# Patient Record
Sex: MALE | Race: WHITE | Employment: FULL TIME
[De-identification: names, ages, dates, MRNs, and addresses within clinical notes are randomized per-mention and may not be internally consistent; named-entity substitution may affect disease eponyms.]

---

## 2017-02-27 ENCOUNTER — TELEPHONE (OUTPATIENT)
Dept: CASE MANAGEMENT | Age: 52
End: 2017-02-27

## 2017-06-07 ENCOUNTER — TELEPHONE (OUTPATIENT)
Dept: CASE MANAGEMENT | Age: 52
End: 2017-06-07

## 2018-03-05 ENCOUNTER — TELEPHONE (OUTPATIENT)
Dept: CASE MANAGEMENT | Age: 53
End: 2018-03-05

## 2019-02-24 ENCOUNTER — HOSPITAL ENCOUNTER (OUTPATIENT)
Age: 54
Discharge: HOME | End: 2019-02-24
Payer: COMMERCIAL

## 2019-02-24 DIAGNOSIS — Z00.00 ROUTINE GENERAL MEDICAL EXAMINATION AT A HEALTH CARE FACILITY: ICD-10-CM

## 2019-02-24 LAB
ALBUMIN SERPL-MCNC: 4.3 G/DL (ref 3.5–5.2)
ALP BLD-CCNC: 62 U/L (ref 40–129)
ALT SERPL-CCNC: 21 U/L (ref 0–40)
ANION GAP SERPL CALCULATED.3IONS-SCNC: 12 MMOL/L (ref 7–16)
AST SERPL-CCNC: 18 U/L (ref 0–39)
BILIRUB SERPL-MCNC: 0.4 MG/DL (ref 0–1.2)
BUN BLDV-MCNC: 11 MG/DL (ref 6–20)
CALCIUM SERPL-MCNC: 9.5 MG/DL (ref 8.6–10.2)
CHLORIDE BLD-SCNC: 103 MMOL/L (ref 98–107)
CHOLESTEROL, TOTAL: 170 MG/DL (ref 0–199)
CO2: 24 MMOL/L (ref 22–29)
CREAT SERPL-MCNC: 0.8 MG/DL (ref 0.7–1.2)
GFR AFRICAN AMERICAN: >60
GFR NON-AFRICAN AMERICAN: >60 ML/MIN/1.73
GLUCOSE BLD-MCNC: 110 MG/DL (ref 74–99)
HCT VFR BLD CALC: 46.9 % (ref 37–54)
HDLC SERPL-MCNC: 32 MG/DL
HEMOGLOBIN: 15.7 G/DL (ref 12.5–16.5)
LDL CHOLESTEROL CALCULATED: 112 MG/DL (ref 0–99)
MCH RBC QN AUTO: 29.6 PG (ref 26–35)
MCHC RBC AUTO-ENTMCNC: 33.5 % (ref 32–34.5)
MCV RBC AUTO: 88.3 FL (ref 80–99.9)
PDW BLD-RTO: 13.1 FL (ref 11.5–15)
PLATELET # BLD: 256 E9/L (ref 130–450)
PMV BLD AUTO: 9.4 FL (ref 7–12)
POTASSIUM SERPL-SCNC: 4.2 MMOL/L (ref 3.5–5)
PROSTATE SPECIFIC ANTIGEN: 1.36 NG/ML (ref 0–4)
RBC # BLD: 5.31 E12/L (ref 3.8–5.8)
SODIUM BLD-SCNC: 139 MMOL/L (ref 132–146)
TOTAL PROTEIN: 7.5 G/DL (ref 6.4–8.3)
TRIGL SERPL-MCNC: 129 MG/DL (ref 0–149)
VLDLC SERPL CALC-MCNC: 26 MG/DL
WBC # BLD: 14.3 E9/L (ref 4.5–11.5)

## 2019-02-24 PROCEDURE — 85027 COMPLETE CBC AUTOMATED: CPT

## 2019-02-24 PROCEDURE — 36415 COLL VENOUS BLD VENIPUNCTURE: CPT

## 2019-02-24 PROCEDURE — 80061 LIPID PANEL: CPT

## 2019-02-24 PROCEDURE — 80053 COMPREHEN METABOLIC PANEL: CPT

## 2019-02-24 PROCEDURE — G0103 PSA SCREENING: HCPCS

## 2019-05-26 ENCOUNTER — HOSPITAL ENCOUNTER (OUTPATIENT)
Age: 54
Discharge: HOME | End: 2019-05-26
Payer: COMMERCIAL

## 2019-05-26 DIAGNOSIS — Z00.00 ROUTINE GENERAL MEDICAL EXAMINATION AT A HEALTH CARE FACILITY: ICD-10-CM

## 2019-05-26 DIAGNOSIS — R63.4 WEIGHT LOSS: ICD-10-CM

## 2019-05-26 LAB
ALBUMIN SERPL-MCNC: 4.2 G/DL (ref 3.5–5.2)
ALP BLD-CCNC: 69 U/L (ref 40–129)
ALT SERPL-CCNC: 17 U/L (ref 0–40)
ANION GAP SERPL CALCULATED.3IONS-SCNC: 12 MMOL/L (ref 7–16)
AST SERPL-CCNC: 20 U/L (ref 0–39)
BILIRUB SERPL-MCNC: 0.3 MG/DL (ref 0–1.2)
BUN BLDV-MCNC: 14 MG/DL (ref 6–20)
C-REACTIVE PROTEIN: 0.5 MG/DL (ref 0–0.4)
CALCIUM SERPL-MCNC: 9.2 MG/DL (ref 8.6–10.2)
CHLORIDE BLD-SCNC: 102 MMOL/L (ref 98–107)
CHOLESTEROL, TOTAL: 144 MG/DL (ref 0–199)
CO2: 25 MMOL/L (ref 22–29)
CREAT SERPL-MCNC: 0.8 MG/DL (ref 0.7–1.2)
GFR AFRICAN AMERICAN: >60
GFR NON-AFRICAN AMERICAN: >60 ML/MIN/1.73
GLUCOSE BLD-MCNC: 109 MG/DL (ref 74–99)
HCT VFR BLD CALC: 44.6 % (ref 37–54)
HDLC SERPL-MCNC: 31 MG/DL
HEMOGLOBIN: 15.1 G/DL (ref 12.5–16.5)
LDL CHOLESTEROL CALCULATED: 85 MG/DL (ref 0–99)
MCH RBC QN AUTO: 29.9 PG (ref 26–35)
MCHC RBC AUTO-ENTMCNC: 33.9 % (ref 32–34.5)
MCV RBC AUTO: 88.3 FL (ref 80–99.9)
PDW BLD-RTO: 13.4 FL (ref 11.5–15)
PLATELET # BLD: 246 E9/L (ref 130–450)
PMV BLD AUTO: 9.5 FL (ref 7–12)
POTASSIUM SERPL-SCNC: 3.9 MMOL/L (ref 3.5–5)
RBC # BLD: 5.05 E12/L (ref 3.8–5.8)
SEDIMENTATION RATE, ERYTHROCYTE: 15 MM/HR (ref 0–15)
SODIUM BLD-SCNC: 139 MMOL/L (ref 132–146)
T3 TOTAL: 125.9 NG/DL (ref 80–200)
T4 TOTAL: 6.8 MCG/DL (ref 4.5–11.7)
TOTAL PROTEIN: 7.4 G/DL (ref 6.4–8.3)
TRIGL SERPL-MCNC: 138 MG/DL (ref 0–149)
VLDLC SERPL CALC-MCNC: 28 MG/DL
WBC # BLD: 12.3 E9/L (ref 4.5–11.5)

## 2019-05-26 PROCEDURE — 85651 RBC SED RATE NONAUTOMATED: CPT

## 2019-05-26 PROCEDURE — 36415 COLL VENOUS BLD VENIPUNCTURE: CPT

## 2019-05-26 PROCEDURE — 84480 ASSAY TRIIODOTHYRONINE (T3): CPT

## 2019-05-26 PROCEDURE — 84436 ASSAY OF TOTAL THYROXINE: CPT

## 2019-05-26 PROCEDURE — 80061 LIPID PANEL: CPT

## 2019-05-26 PROCEDURE — 86140 C-REACTIVE PROTEIN: CPT

## 2019-05-26 PROCEDURE — 85027 COMPLETE CBC AUTOMATED: CPT

## 2019-05-26 PROCEDURE — 80053 COMPREHEN METABOLIC PANEL: CPT

## 2019-08-20 ENCOUNTER — INITIAL CONSULT (OUTPATIENT)
Dept: CARDIOTHORACIC SURGERY | Age: 54
End: 2019-08-20
Payer: COMMERCIAL

## 2019-08-20 VITALS
BODY MASS INDEX: 25.62 KG/M2 | DIASTOLIC BLOOD PRESSURE: 72 MMHG | SYSTOLIC BLOOD PRESSURE: 124 MMHG | WEIGHT: 183 LBS | HEIGHT: 71 IN

## 2019-08-20 DIAGNOSIS — R91.8 MASS OF RIGHT LUNG: Primary | ICD-10-CM

## 2019-08-20 DIAGNOSIS — Z72.0 TOBACCO ABUSE: ICD-10-CM

## 2019-08-20 PROCEDURE — 99205 OFFICE O/P NEW HI 60 MIN: CPT | Performed by: THORACIC SURGERY (CARDIOTHORACIC VASCULAR SURGERY)

## 2019-08-20 ASSESSMENT — ENCOUNTER SYMPTOMS
COUGH: 1
CONSTIPATION: 0
ABDOMINAL PAIN: 0
SHORTNESS OF BREATH: 1

## 2019-08-20 NOTE — LETTER
[address] [facility]  [address] [place] [address] [place] [address]  Phone: [phone]  Fax: [phone]    Eliceo Diaz MD        August 20, 2019    [place] [address]      To whom it may concern:    Khushi Wallis is scheduled to have surgery on 8/30/19. If you have any questions or concerns, please don't hesitate to call.     Sincerely,            Eliceo Diaz MD

## 2019-08-20 NOTE — LETTER
[address] [facility]  [address] [facility] [address]  Phone: [phone]  Fax: [phone]    Charlie Drew MD        August 20, 2019     Marianne Mejía, [address] [facility] [address]    Patient: Heber Cabrera  MR Number: [id number]  YOB: 1965  Date of Visit: 8/20/2019    Dear Dr. Marianne Mejía: Thank you for the request for consultation for Sherice Prakash   Past Medical History:   Diagnosis Date    Hyperlipidemia        History reviewed. No pertinent surgical history. History reviewed. No pertinent family history. No Known Allergies      Current Outpatient Medications:     Umeclidinium Bromide 62.5 MCG/INH AEPB, Inhale into the lungs, Disp: , Rfl:     HYDROcodone-acetaminophen (NORCO) 7.5-325 MG per tablet, Take 1 tablet by mouth every 6 hours as needed for Pain for up to 30 days. Intended supply: 30 days, Disp: 120 tablet, Rfl: 0    albuterol sulfate  (90 Base) MCG/ACT inhaler, Inhale 2 puffs into the lungs 4 times daily as needed for Wheezing, Disp: 1 Inhaler, Rfl: 2    PROVENTIL  (90 Base) MCG/ACT inhaler, Inhale 2 puffs into the lungs every 6 hours as needed for Wheezing, Disp: 1 Inhaler, Rfl: 3    Social History     Tobacco Use    Smoking status: Current Every Day Smoker     Packs/day: 1.00     Years: 35.00     Pack years: 35.00     Types: Cigarettes    Smokeless tobacco: Never Used    Tobacco comment: trying to quit down to pack a day from 4-5 packs   Substance Use Topics    Alcohol use: Not on file       Vitals:    08/20/19 0940   BP: 124/72   Site: Left Upper Arm   Position: Sitting   Weight: 183 lb (83 kg)   Height: 5' 11\" (1.803 m)       Subjective:      Patient ID: Heber Cabrera is a 48 y.o. male.   Chief Complaint   Patient presents with    Surgical Consult     lung nodule referral Dr Jonelle Joseph     This is a 48year old heavy smoker (4ppd) who is being sent for right

## 2019-08-21 ENCOUNTER — PREP FOR PROCEDURE (OUTPATIENT)
Dept: CARDIOTHORACIC SURGERY | Age: 54
End: 2019-08-21

## 2019-08-21 DIAGNOSIS — Z01.818 PREOP TESTING: Primary | ICD-10-CM

## 2019-08-21 RX ORDER — SODIUM CHLORIDE 0.9 % (FLUSH) 0.9 %
10 SYRINGE (ML) INJECTION PRN
Status: CANCELLED | OUTPATIENT
Start: 2019-08-21

## 2019-08-21 RX ORDER — CHLORHEXIDINE GLUCONATE ORAL RINSE 1.2 MG/ML
15 SOLUTION DENTAL ONCE
Status: CANCELLED | OUTPATIENT
Start: 2019-08-21 | End: 2019-08-21

## 2019-08-21 RX ORDER — SODIUM CHLORIDE 9 MG/ML
INJECTION, SOLUTION INTRAVENOUS CONTINUOUS
Status: CANCELLED | OUTPATIENT
Start: 2019-08-21

## 2019-08-21 RX ORDER — CHLORHEXIDINE GLUCONATE 4 G/100ML
SOLUTION TOPICAL ONCE
Status: CANCELLED | OUTPATIENT
Start: 2019-08-21 | End: 2019-08-21

## 2019-08-21 RX ORDER — SODIUM CHLORIDE 0.9 % (FLUSH) 0.9 %
10 SYRINGE (ML) INJECTION EVERY 12 HOURS SCHEDULED
Status: CANCELLED | OUTPATIENT
Start: 2019-08-21

## 2019-08-23 RX ORDER — GEMFIBROZIL 600 MG/1
600 TABLET, FILM COATED ORAL DAILY
COMMUNITY
End: 2019-09-25

## 2019-08-23 SDOH — HEALTH STABILITY: MENTAL HEALTH: HOW OFTEN DO YOU HAVE A DRINK CONTAINING ALCOHOL?: NEVER

## 2019-08-23 NOTE — PROGRESS NOTES
acceptable-education is needed). Your post-op pain goal is:5  [x] Ask your nurse for your pain medication. MEDICATION INSTRUCTIONS:   [x]Bring a complete list of your medications, please write the last time you took the medicine, give this list to the nurse. [x] Use your inhalers the morning of surgery. Bring your emergency inhaler with you day of surgery. [] Follow physician instructions regarding any blood thinners you may be taking. WHAT TO EXPECT:  [x] The day of surgery you will be greeted and checked in by the [employer].  In addition, you will be registered in the [place] by a Patient Access Representative. Please bring your photo ID and insurance card. A nurse will greet you in accordance to the time you are needed in the pre-op area to prepare you for surgery. Please do not be discouraged if you are not greeted in the order you arrive as there are many variables that are involved in patient preparation. Your patience is greatly appreciated as you wait for your nurse. Please bring in items such as: books, magazines, newspapers, electronics, or any other items  to occupy your time in the waiting area. [x]  Delays may occur with surgery and staff will make a sincere effort to keep you informed of delays. If any delays occur with your procedure, we apologize ahead of time for your inconvenience as we recognize the value of your time.

## 2019-08-28 ENCOUNTER — HOSPITAL ENCOUNTER (OUTPATIENT)
Dept: GENERAL RADIOLOGY | Age: 54
Discharge: HOME | End: 2019-08-30
Payer: COMMERCIAL

## 2019-08-28 ENCOUNTER — HOSPITAL ENCOUNTER (OUTPATIENT)
Dept: PREADMISSION TESTING | Age: 54
Discharge: HOME | End: 2019-08-28
Payer: COMMERCIAL

## 2019-08-28 ENCOUNTER — ANESTHESIA EVENT (OUTPATIENT)
Dept: OPERATING ROOM | Age: 54
DRG: 167 | End: 2019-08-28
Payer: COMMERCIAL

## 2019-08-28 VITALS
OXYGEN SATURATION: 97 % | RESPIRATION RATE: 18 BRPM | SYSTOLIC BLOOD PRESSURE: 122 MMHG | TEMPERATURE: 98.1 F | HEART RATE: 70 BPM | DIASTOLIC BLOOD PRESSURE: 79 MMHG

## 2019-08-28 DIAGNOSIS — Z01.818 PREOP TESTING: ICD-10-CM

## 2019-08-28 LAB
ABO/RH: NORMAL
ALBUMIN SERPL-MCNC: 4.4 G/DL (ref 3.5–5.2)
ALP BLD-CCNC: 73 U/L (ref 40–129)
ALT SERPL-CCNC: 16 U/L (ref 0–40)
ANION GAP SERPL CALCULATED.3IONS-SCNC: 7 MMOL/L (ref 7–16)
ANTIBODY SCREEN: NORMAL
AST SERPL-CCNC: 17 U/L (ref 0–39)
BILIRUB SERPL-MCNC: 0.3 MG/DL (ref 0–1.2)
BUN BLDV-MCNC: 14 MG/DL (ref 6–20)
CALCIUM SERPL-MCNC: 9.6 MG/DL (ref 8.6–10.2)
CHLORIDE BLD-SCNC: 102 MMOL/L (ref 98–107)
CO2: 28 MMOL/L (ref 22–29)
CREAT SERPL-MCNC: 0.9 MG/DL (ref 0.7–1.2)
EKG ATRIAL RATE: 66 BPM
EKG P AXIS: 32 DEGREES
EKG P-R INTERVAL: 184 MS
EKG Q-T INTERVAL: 410 MS
EKG QRS DURATION: 116 MS
EKG QTC CALCULATION (BAZETT): 429 MS
EKG R AXIS: 68 DEGREES
EKG T AXIS: 37 DEGREES
EKG VENTRICULAR RATE: 66 BPM
GFR AFRICAN AMERICAN: >60
GFR NON-AFRICAN AMERICAN: >60 ML/MIN/1.73
GLUCOSE BLD-MCNC: 111 MG/DL (ref 74–99)
HCT VFR BLD CALC: 46.5 % (ref 37–54)
HEMOGLOBIN: 15.3 G/DL (ref 12.5–16.5)
INR BLD: 1
MCH RBC QN AUTO: 29.3 PG (ref 26–35)
MCHC RBC AUTO-ENTMCNC: 32.9 % (ref 32–34.5)
MCV RBC AUTO: 88.9 FL (ref 80–99.9)
PDW BLD-RTO: 13.7 FL (ref 11.5–15)
PLATELET # BLD: 287 E9/L (ref 130–450)
PMV BLD AUTO: 9.7 FL (ref 7–12)
POTASSIUM REFLEX MAGNESIUM: 4.8 MMOL/L (ref 3.5–5)
PROTHROMBIN TIME: 11.2 SEC (ref 9.3–12.4)
RBC # BLD: 5.23 E12/L (ref 3.8–5.8)
SODIUM BLD-SCNC: 137 MMOL/L (ref 132–146)
TOTAL PROTEIN: 7.8 G/DL (ref 6.4–8.3)
WBC # BLD: 14.3 E9/L (ref 4.5–11.5)

## 2019-08-28 PROCEDURE — 80053 COMPREHEN METABOLIC PANEL: CPT

## 2019-08-28 PROCEDURE — 87081 CULTURE SCREEN ONLY: CPT

## 2019-08-28 PROCEDURE — 71046 X-RAY EXAM CHEST 2 VIEWS: CPT

## 2019-08-28 PROCEDURE — 86901 BLOOD TYPING SEROLOGIC RH(D): CPT

## 2019-08-28 PROCEDURE — 85027 COMPLETE CBC AUTOMATED: CPT

## 2019-08-28 PROCEDURE — 93010 ELECTROCARDIOGRAM REPORT: CPT | Performed by: INTERNAL MEDICINE

## 2019-08-28 PROCEDURE — 86900 BLOOD TYPING SEROLOGIC ABO: CPT

## 2019-08-28 PROCEDURE — 86850 RBC ANTIBODY SCREEN: CPT

## 2019-08-28 PROCEDURE — 86923 COMPATIBILITY TEST ELECTRIC: CPT

## 2019-08-28 PROCEDURE — 36415 COLL VENOUS BLD VENIPUNCTURE: CPT

## 2019-08-28 PROCEDURE — 93005 ELECTROCARDIOGRAM TRACING: CPT

## 2019-08-28 PROCEDURE — 85610 PROTHROMBIN TIME: CPT

## 2019-08-28 ASSESSMENT — LIFESTYLE VARIABLES: SMOKING_STATUS: 1

## 2019-08-28 ASSESSMENT — ENCOUNTER SYMPTOMS: SHORTNESS OF BREATH: 1

## 2019-08-28 NOTE — ANESTHESIA PRE PROCEDURE
Procedure Laterality Date    CARPAL TUNNEL RELEASE Bilateral 2018 2017.   DR Heard Hy    COLONOSCOPY  2016    EYE SURGERY  1980    DETACHED RETINA    TOOTH EXTRACTION      FULL MOUTH       Social History:    Social History     Tobacco Use    Smoking status: Current Every Day Smoker     Packs/day: 1.50     Years: 35.00     Pack years: 52.50     Types: Cigarettes    Smokeless tobacco: Never Used    Tobacco comment: trying to quit down to pack a day from 4-5 packs   Substance Use Topics    Alcohol use: Never     Frequency: Never                                Ready to quit: No  Counseling given: Yes  Comment: trying to quit down to pack a day from 4-5 packs      Vital Signs (Current):   Vitals:    08/28/19 0811   BP: 122/79   Pulse: 70   Resp: 18   Temp: 36.7 °C (98.1 °F)   TempSrc: Oral   SpO2: 97%                                              BP Readings from Last 3 Encounters:   08/28/19 122/79   08/20/19 124/72   07/31/19 110/70       NPO Status:  Pt instructed to be NPO night prior to surgery- pt states an understanding                                                                               BMI:   Wt Readings from Last 3 Encounters:   08/20/19 183 lb (83 kg)   07/03/19 180 lb (81.6 kg)   06/04/19 181 lb (82.1 kg)     There is no height or weight on file to calculate BMI.    CBC:   Lab Results   Component Value Date    WBC 14.3 08/28/2019    RBC 5.23 08/28/2019    HGB 15.3 08/28/2019    HCT 46.5 08/28/2019    MCV 88.9 08/28/2019    RDW 13.7 08/28/2019     08/28/2019       CMP:   Lab Results   Component Value Date     08/28/2019    K 4.8 08/28/2019     08/28/2019    CO2 28 08/28/2019    BUN 14 08/28/2019    CREATININE 0.9 08/28/2019    GFRAA >60 08/28/2019    LABGLOM >60 08/28/2019    GLUCOSE 111 08/28/2019    PROT 7.8 08/28/2019    CALCIUM 9.6 08/28/2019    BILITOT 0.3 08/28/2019    ALKPHOS 73 08/28/2019    AST 17 08/28/2019    ALT 16 08/28/2019       POC Tests: No results for

## 2019-08-29 LAB — MRSA CULTURE ONLY: NORMAL

## 2019-08-30 ENCOUNTER — ANESTHESIA (OUTPATIENT)
Dept: OPERATING ROOM | Age: 54
DRG: 167 | End: 2019-08-30
Payer: COMMERCIAL

## 2019-08-30 ENCOUNTER — APPOINTMENT (OUTPATIENT)
Dept: GENERAL RADIOLOGY | Age: 54
DRG: 167 | End: 2019-08-30
Attending: THORACIC SURGERY (CARDIOTHORACIC VASCULAR SURGERY)
Payer: COMMERCIAL

## 2019-08-30 ENCOUNTER — HOSPITAL ENCOUNTER (INPATIENT)
Age: 54
LOS: 5 days | Discharge: HOME | DRG: 167 | End: 2019-09-04
Attending: THORACIC SURGERY (CARDIOTHORACIC VASCULAR SURGERY) | Admitting: THORACIC SURGERY (CARDIOTHORACIC VASCULAR SURGERY)
Payer: COMMERCIAL

## 2019-08-30 VITALS — OXYGEN SATURATION: 99 %

## 2019-08-30 PROBLEM — R91.8 MASS OF RIGHT LUNG: Status: ACTIVE | Noted: 2019-08-30

## 2019-08-30 LAB
ANION GAP SERPL CALCULATED.3IONS-SCNC: 9 MMOL/L (ref 7–16)
BLOOD BANK DISPENSE STATUS: NORMAL
BLOOD BANK PRODUCT CODE: NORMAL
BPU ID: NORMAL
BUN BLDV-MCNC: 12 MG/DL (ref 6–20)
CALCIUM SERPL-MCNC: 8.5 MG/DL (ref 8.6–10.2)
CHLORIDE BLD-SCNC: 103 MMOL/L (ref 98–107)
CO2: 24 MMOL/L (ref 22–29)
CREAT SERPL-MCNC: 0.7 MG/DL (ref 0.7–1.2)
DESCRIPTION BLOOD BANK: NORMAL
GFR AFRICAN AMERICAN: >60
GFR NON-AFRICAN AMERICAN: >60 ML/MIN/1.73
GLUCOSE BLD-MCNC: 136 MG/DL (ref 74–99)
HCT VFR BLD CALC: 45.5 % (ref 37–54)
HEMOGLOBIN: 14.5 G/DL (ref 12.5–16.5)
MAGNESIUM: 2.5 MG/DL (ref 1.6–2.6)
MCH RBC QN AUTO: 28.9 PG (ref 26–35)
MCHC RBC AUTO-ENTMCNC: 31.9 % (ref 32–34.5)
MCV RBC AUTO: 90.8 FL (ref 80–99.9)
PDW BLD-RTO: 13.7 FL (ref 11.5–15)
PLATELET # BLD: 270 E9/L (ref 130–450)
PMV BLD AUTO: 9.4 FL (ref 7–12)
POTASSIUM SERPL-SCNC: 4.3 MMOL/L (ref 3.5–5)
RBC # BLD: 5.01 E12/L (ref 3.8–5.8)
SODIUM BLD-SCNC: 136 MMOL/L (ref 132–146)
WBC # BLD: 14.1 E9/L (ref 4.5–11.5)

## 2019-08-30 PROCEDURE — 80048 BASIC METABOLIC PNL TOTAL CA: CPT

## 2019-08-30 PROCEDURE — 2140000000 HC CCU INTERMEDIATE R&B

## 2019-08-30 PROCEDURE — 6370000000 HC RX 637 (ALT 250 FOR IP): Performed by: NURSE PRACTITIONER

## 2019-08-30 PROCEDURE — 6360000002 HC RX W HCPCS: Performed by: PHYSICIAN ASSISTANT

## 2019-08-30 PROCEDURE — 6370000000 HC RX 637 (ALT 250 FOR IP): Performed by: ANESTHESIOLOGY

## 2019-08-30 PROCEDURE — 94640 AIRWAY INHALATION TREATMENT: CPT

## 2019-08-30 PROCEDURE — 2580000003 HC RX 258: Performed by: PHYSICIAN ASSISTANT

## 2019-08-30 PROCEDURE — 83735 ASSAY OF MAGNESIUM: CPT

## 2019-08-30 PROCEDURE — 32674 THORACOSCOPY LYMPH NODE EXC: CPT | Performed by: THORACIC SURGERY (CARDIOTHORACIC VASCULAR SURGERY)

## 2019-08-30 PROCEDURE — 2500000003 HC RX 250 WO HCPCS: Performed by: ANESTHESIOLOGIST ASSISTANT

## 2019-08-30 PROCEDURE — 0BJ08ZZ INSPECTION OF TRACHEOBRONCHIAL TREE, VIA NATURAL OR ARTIFICIAL OPENING ENDOSCOPIC: ICD-10-PCS | Performed by: THORACIC SURGERY (CARDIOTHORACIC VASCULAR SURGERY)

## 2019-08-30 PROCEDURE — 6360000002 HC RX W HCPCS: Performed by: ANESTHESIOLOGY

## 2019-08-30 PROCEDURE — 3600000009 HC SURGERY ROBOT BASE: Performed by: THORACIC SURGERY (CARDIOTHORACIC VASCULAR SURGERY)

## 2019-08-30 PROCEDURE — 85027 COMPLETE CBC AUTOMATED: CPT

## 2019-08-30 PROCEDURE — 8E0W4CZ ROBOTIC ASSISTED PROCEDURE OF TRUNK REGION, PERCUTANEOUS ENDOSCOPIC APPROACH: ICD-10-PCS | Performed by: THORACIC SURGERY (CARDIOTHORACIC VASCULAR SURGERY)

## 2019-08-30 PROCEDURE — 3700000001 HC ADD 15 MINUTES (ANESTHESIA): Performed by: THORACIC SURGERY (CARDIOTHORACIC VASCULAR SURGERY)

## 2019-08-30 PROCEDURE — 2700000000 HC OXYGEN THERAPY PER DAY

## 2019-08-30 PROCEDURE — 6360000002 HC RX W HCPCS: Performed by: ANESTHESIOLOGIST ASSISTANT

## 2019-08-30 PROCEDURE — 3600000019 HC SURGERY ROBOT ADDTL 15MIN: Performed by: THORACIC SURGERY (CARDIOTHORACIC VASCULAR SURGERY)

## 2019-08-30 PROCEDURE — 07T74ZZ RESECTION OF THORAX LYMPHATIC, PERCUTANEOUS ENDOSCOPIC APPROACH: ICD-10-PCS | Performed by: THORACIC SURGERY (CARDIOTHORACIC VASCULAR SURGERY)

## 2019-08-30 PROCEDURE — 2580000003 HC RX 258: Performed by: ANESTHESIOLOGIST ASSISTANT

## 2019-08-30 PROCEDURE — 2580000003 HC RX 258: Performed by: NURSE PRACTITIONER

## 2019-08-30 PROCEDURE — 0BBC4ZX EXCISION OF RIGHT UPPER LUNG LOBE, PERCUTANEOUS ENDOSCOPIC APPROACH, DIAGNOSTIC: ICD-10-PCS | Performed by: THORACIC SURGERY (CARDIOTHORACIC VASCULAR SURGERY)

## 2019-08-30 PROCEDURE — 94760 N-INVAS EAR/PLS OXIMETRY 1: CPT

## 2019-08-30 PROCEDURE — 32607 THORACOSCOPY W/BX INFILTRATE: CPT | Performed by: THORACIC SURGERY (CARDIOTHORACIC VASCULAR SURGERY)

## 2019-08-30 PROCEDURE — S2900 ROBOTIC SURGICAL SYSTEM: HCPCS | Performed by: THORACIC SURGERY (CARDIOTHORACIC VASCULAR SURGERY)

## 2019-08-30 PROCEDURE — 2709999900 HC NON-CHARGEABLE SUPPLY: Performed by: THORACIC SURGERY (CARDIOTHORACIC VASCULAR SURGERY)

## 2019-08-30 PROCEDURE — 88331 PATH CONSLTJ SURG 1 BLK 1SPC: CPT

## 2019-08-30 PROCEDURE — 7100000001 HC PACU RECOVERY - ADDTL 15 MIN: Performed by: THORACIC SURGERY (CARDIOTHORACIC VASCULAR SURGERY)

## 2019-08-30 PROCEDURE — 76942 ECHO GUIDE FOR BIOPSY: CPT | Performed by: ANESTHESIOLOGY

## 2019-08-30 PROCEDURE — 6360000002 HC RX W HCPCS: Performed by: NURSE PRACTITIONER

## 2019-08-30 PROCEDURE — 6370000000 HC RX 637 (ALT 250 FOR IP): Performed by: PHYSICIAN ASSISTANT

## 2019-08-30 PROCEDURE — 2500000003 HC RX 250 WO HCPCS: Performed by: THORACIC SURGERY (CARDIOTHORACIC VASCULAR SURGERY)

## 2019-08-30 PROCEDURE — 88307 TISSUE EXAM BY PATHOLOGIST: CPT

## 2019-08-30 PROCEDURE — 36415 COLL VENOUS BLD VENIPUNCTURE: CPT

## 2019-08-30 PROCEDURE — 7100000000 HC PACU RECOVERY - FIRST 15 MIN: Performed by: THORACIC SURGERY (CARDIOTHORACIC VASCULAR SURGERY)

## 2019-08-30 PROCEDURE — 71045 X-RAY EXAM CHEST 1 VIEW: CPT

## 2019-08-30 PROCEDURE — 94664 DEMO&/EVAL PT USE INHALER: CPT

## 2019-08-30 PROCEDURE — 2720000010 HC SURG SUPPLY STERILE: Performed by: THORACIC SURGERY (CARDIOTHORACIC VASCULAR SURGERY)

## 2019-08-30 PROCEDURE — 3700000000 HC ANESTHESIA ATTENDED CARE: Performed by: THORACIC SURGERY (CARDIOTHORACIC VASCULAR SURGERY)

## 2019-08-30 PROCEDURE — 88312 SPECIAL STAINS GROUP 1: CPT

## 2019-08-30 RX ORDER — DEXAMETHASONE SODIUM PHOSPHATE 10 MG/ML
INJECTION INTRAMUSCULAR; INTRAVENOUS PRN
Status: DISCONTINUED | OUTPATIENT
Start: 2019-08-30 | End: 2019-08-30 | Stop reason: SDUPTHER

## 2019-08-30 RX ORDER — MORPHINE SULFATE 2 MG/ML
2 INJECTION, SOLUTION INTRAMUSCULAR; INTRAVENOUS EVERY 5 MIN PRN
Status: DISCONTINUED | OUTPATIENT
Start: 2019-08-30 | End: 2019-08-30

## 2019-08-30 RX ORDER — SODIUM CHLORIDE 9 MG/ML
INJECTION, SOLUTION INTRAVENOUS CONTINUOUS
Status: DISCONTINUED | OUTPATIENT
Start: 2019-08-30 | End: 2019-08-30

## 2019-08-30 RX ORDER — GLYCOPYRROLATE 1 MG/5 ML
SYRINGE (ML) INTRAVENOUS PRN
Status: DISCONTINUED | OUTPATIENT
Start: 2019-08-30 | End: 2019-08-30 | Stop reason: SDUPTHER

## 2019-08-30 RX ORDER — CELECOXIB 100 MG/1
200 CAPSULE ORAL ONCE
Status: COMPLETED | OUTPATIENT
Start: 2019-08-30 | End: 2019-08-30

## 2019-08-30 RX ORDER — ACETAMINOPHEN 325 MG/1
650 TABLET ORAL EVERY 6 HOURS
Status: DISPENSED | OUTPATIENT
Start: 2019-08-30 | End: 2019-09-01

## 2019-08-30 RX ORDER — BUPIVACAINE HYDROCHLORIDE AND EPINEPHRINE 5; 5 MG/ML; UG/ML
INJECTION, SOLUTION EPIDURAL; INTRACAUDAL; PERINEURAL PRN
Status: DISCONTINUED | OUTPATIENT
Start: 2019-08-30 | End: 2019-08-30 | Stop reason: ALTCHOICE

## 2019-08-30 RX ORDER — PROPOFOL 10 MG/ML
INJECTION, EMULSION INTRAVENOUS PRN
Status: DISCONTINUED | OUTPATIENT
Start: 2019-08-30 | End: 2019-08-30 | Stop reason: SDUPTHER

## 2019-08-30 RX ORDER — KETOROLAC TROMETHAMINE 30 MG/ML
15 INJECTION, SOLUTION INTRAMUSCULAR; INTRAVENOUS EVERY 6 HOURS
Status: COMPLETED | OUTPATIENT
Start: 2019-08-30 | End: 2019-09-01

## 2019-08-30 RX ORDER — OXYCODONE HYDROCHLORIDE 5 MG/1
5 TABLET ORAL EVERY 4 HOURS PRN
Status: DISCONTINUED | OUTPATIENT
Start: 2019-08-30 | End: 2019-09-04 | Stop reason: HOSPADM

## 2019-08-30 RX ORDER — MORPHINE SULFATE 2 MG/ML
2 INJECTION, SOLUTION INTRAMUSCULAR; INTRAVENOUS EVERY 4 HOURS PRN
Status: DISCONTINUED | OUTPATIENT
Start: 2019-08-30 | End: 2019-09-04 | Stop reason: HOSPADM

## 2019-08-30 RX ORDER — ONDANSETRON 2 MG/ML
4 INJECTION INTRAMUSCULAR; INTRAVENOUS
Status: DISCONTINUED | OUTPATIENT
Start: 2019-08-30 | End: 2019-08-30

## 2019-08-30 RX ORDER — CEFAZOLIN SODIUM 2 G/50ML
2 SOLUTION INTRAVENOUS
Status: COMPLETED | OUTPATIENT
Start: 2019-08-30 | End: 2019-08-30

## 2019-08-30 RX ORDER — ACETAMINOPHEN 500 MG
1000 TABLET ORAL ONCE
Status: COMPLETED | OUTPATIENT
Start: 2019-08-30 | End: 2019-08-30

## 2019-08-30 RX ORDER — ROPIVACAINE HYDROCHLORIDE 5 MG/ML
30 INJECTION, SOLUTION EPIDURAL; INFILTRATION; PERINEURAL
Status: DISCONTINUED | OUTPATIENT
Start: 2019-08-30 | End: 2019-08-30

## 2019-08-30 RX ORDER — MORPHINE SULFATE 2 MG/ML
1 INJECTION, SOLUTION INTRAMUSCULAR; INTRAVENOUS EVERY 5 MIN PRN
Status: DISCONTINUED | OUTPATIENT
Start: 2019-08-30 | End: 2019-08-30

## 2019-08-30 RX ORDER — AMIODARONE HYDROCHLORIDE 200 MG/1
200 TABLET ORAL 2 TIMES DAILY
Status: CANCELLED | OUTPATIENT
Start: 2019-08-30

## 2019-08-30 RX ORDER — ROCURONIUM BROMIDE 10 MG/ML
INJECTION, SOLUTION INTRAVENOUS PRN
Status: DISCONTINUED | OUTPATIENT
Start: 2019-08-30 | End: 2019-08-30 | Stop reason: SDUPTHER

## 2019-08-30 RX ORDER — CEFAZOLIN SODIUM 2 G/50ML
2 SOLUTION INTRAVENOUS EVERY 8 HOURS
Status: COMPLETED | OUTPATIENT
Start: 2019-08-30 | End: 2019-08-31

## 2019-08-30 RX ORDER — MEPERIDINE HYDROCHLORIDE 50 MG/ML
12.5 INJECTION INTRAMUSCULAR; INTRAVENOUS; SUBCUTANEOUS
Status: DISCONTINUED | OUTPATIENT
Start: 2019-08-30 | End: 2019-08-30

## 2019-08-30 RX ORDER — ROPIVACAINE HYDROCHLORIDE 2 MG/ML
INJECTION, SOLUTION EPIDURAL; INFILTRATION; PERINEURAL CONTINUOUS PRN
Status: DISCONTINUED | OUTPATIENT
Start: 2019-08-30 | End: 2019-08-30 | Stop reason: SDUPTHER

## 2019-08-30 RX ORDER — ROPIVACAINE HYDROCHLORIDE 5 MG/ML
INJECTION, SOLUTION EPIDURAL; INFILTRATION; PERINEURAL
Status: COMPLETED | OUTPATIENT
Start: 2019-08-30 | End: 2019-08-30

## 2019-08-30 RX ORDER — SODIUM CHLORIDE 0.9 % (FLUSH) 0.9 %
10 SYRINGE (ML) INJECTION EVERY 12 HOURS SCHEDULED
Status: DISCONTINUED | OUTPATIENT
Start: 2019-08-30 | End: 2019-09-04 | Stop reason: HOSPADM

## 2019-08-30 RX ORDER — CHLORHEXIDINE GLUCONATE 4 G/100ML
SOLUTION TOPICAL ONCE
Status: DISCONTINUED | OUTPATIENT
Start: 2019-08-30 | End: 2019-08-30

## 2019-08-30 RX ORDER — SODIUM CHLORIDE 0.9 % (FLUSH) 0.9 %
10 SYRINGE (ML) INJECTION PRN
Status: DISCONTINUED | OUTPATIENT
Start: 2019-08-30 | End: 2019-08-30

## 2019-08-30 RX ORDER — LIDOCAINE HYDROCHLORIDE 20 MG/ML
INJECTION, SOLUTION INTRAVENOUS PRN
Status: DISCONTINUED | OUTPATIENT
Start: 2019-08-30 | End: 2019-08-30 | Stop reason: SDUPTHER

## 2019-08-30 RX ORDER — ONDANSETRON 2 MG/ML
INJECTION INTRAMUSCULAR; INTRAVENOUS PRN
Status: DISCONTINUED | OUTPATIENT
Start: 2019-08-30 | End: 2019-08-30 | Stop reason: SDUPTHER

## 2019-08-30 RX ORDER — MIDAZOLAM HYDROCHLORIDE 1 MG/ML
1 INJECTION INTRAMUSCULAR; INTRAVENOUS PRN
Status: DISCONTINUED | OUTPATIENT
Start: 2019-08-30 | End: 2019-08-30

## 2019-08-30 RX ORDER — ONDANSETRON 2 MG/ML
4 INJECTION INTRAMUSCULAR; INTRAVENOUS EVERY 6 HOURS PRN
Status: DISCONTINUED | OUTPATIENT
Start: 2019-08-30 | End: 2019-09-04 | Stop reason: HOSPADM

## 2019-08-30 RX ORDER — SODIUM CHLORIDE 9 MG/ML
INJECTION, SOLUTION INTRAVENOUS CONTINUOUS PRN
Status: DISCONTINUED | OUTPATIENT
Start: 2019-08-30 | End: 2019-08-30 | Stop reason: SDUPTHER

## 2019-08-30 RX ORDER — CHLORHEXIDINE GLUCONATE 0.12 MG/ML
15 RINSE ORAL ONCE
Status: COMPLETED | OUTPATIENT
Start: 2019-08-30 | End: 2019-08-30

## 2019-08-30 RX ORDER — FENTANYL CITRATE 50 UG/ML
INJECTION, SOLUTION INTRAMUSCULAR; INTRAVENOUS PRN
Status: DISCONTINUED | OUTPATIENT
Start: 2019-08-30 | End: 2019-08-30 | Stop reason: SDUPTHER

## 2019-08-30 RX ORDER — MAGNESIUM SULFATE HEPTAHYDRATE 500 MG/ML
INJECTION, SOLUTION INTRAMUSCULAR; INTRAVENOUS PRN
Status: DISCONTINUED | OUTPATIENT
Start: 2019-08-30 | End: 2019-08-30 | Stop reason: SDUPTHER

## 2019-08-30 RX ORDER — GABAPENTIN 300 MG/1
600 CAPSULE ORAL ONCE
Status: COMPLETED | OUTPATIENT
Start: 2019-08-30 | End: 2019-08-30

## 2019-08-30 RX ORDER — NEOSTIGMINE METHYLSULFATE 1 MG/ML
INJECTION, SOLUTION INTRAVENOUS PRN
Status: DISCONTINUED | OUTPATIENT
Start: 2019-08-30 | End: 2019-08-30 | Stop reason: SDUPTHER

## 2019-08-30 RX ORDER — IPRATROPIUM BROMIDE AND ALBUTEROL SULFATE 2.5; .5 MG/3ML; MG/3ML
1 SOLUTION RESPIRATORY (INHALATION)
Status: DISCONTINUED | OUTPATIENT
Start: 2019-08-30 | End: 2019-09-01

## 2019-08-30 RX ORDER — SENNA PLUS 8.6 MG/1
1 TABLET ORAL NIGHTLY
Status: DISCONTINUED | OUTPATIENT
Start: 2019-08-31 | End: 2019-09-04 | Stop reason: HOSPADM

## 2019-08-30 RX ORDER — SODIUM CHLORIDE 0.9 % (FLUSH) 0.9 %
10 SYRINGE (ML) INJECTION EVERY 12 HOURS SCHEDULED
Status: DISCONTINUED | OUTPATIENT
Start: 2019-08-30 | End: 2019-08-30

## 2019-08-30 RX ORDER — OXYCODONE HCL 10 MG/1
10 TABLET, FILM COATED, EXTENDED RELEASE ORAL ONCE
Status: COMPLETED | OUTPATIENT
Start: 2019-08-30 | End: 2019-08-30

## 2019-08-30 RX ORDER — DOCUSATE SODIUM 100 MG/1
100 CAPSULE, LIQUID FILLED ORAL 2 TIMES DAILY
Status: DISCONTINUED | OUTPATIENT
Start: 2019-08-30 | End: 2019-09-04 | Stop reason: HOSPADM

## 2019-08-30 RX ORDER — OXYCODONE HYDROCHLORIDE AND ACETAMINOPHEN 5; 325 MG/1; MG/1
1 TABLET ORAL PRN
Status: DISCONTINUED | OUTPATIENT
Start: 2019-08-30 | End: 2019-08-30

## 2019-08-30 RX ORDER — KETOROLAC TROMETHAMINE 30 MG/ML
INJECTION, SOLUTION INTRAMUSCULAR; INTRAVENOUS PRN
Status: DISCONTINUED | OUTPATIENT
Start: 2019-08-30 | End: 2019-08-30 | Stop reason: SDUPTHER

## 2019-08-30 RX ORDER — OXYCODONE HYDROCHLORIDE AND ACETAMINOPHEN 5; 325 MG/1; MG/1
2 TABLET ORAL PRN
Status: DISCONTINUED | OUTPATIENT
Start: 2019-08-30 | End: 2019-08-30

## 2019-08-30 RX ORDER — SODIUM CHLORIDE 0.9 % (FLUSH) 0.9 %
10 SYRINGE (ML) INJECTION PRN
Status: DISCONTINUED | OUTPATIENT
Start: 2019-08-30 | End: 2019-09-04 | Stop reason: HOSPADM

## 2019-08-30 RX ADMIN — FENTANYL CITRATE 100 MCG: 50 INJECTION, SOLUTION INTRAMUSCULAR; INTRAVENOUS at 10:44

## 2019-08-30 RX ADMIN — ACETAMINOPHEN 1000 MG: 500 TABLET ORAL at 08:02

## 2019-08-30 RX ADMIN — KETOROLAC TROMETHAMINE 30 MG: 30 INJECTION, SOLUTION INTRAMUSCULAR; INTRAVENOUS at 11:52

## 2019-08-30 RX ADMIN — Medication 3 MG: at 11:52

## 2019-08-30 RX ADMIN — PROPOFOL 200 MG: 10 INJECTION, EMULSION INTRAVENOUS at 10:44

## 2019-08-30 RX ADMIN — CEFAZOLIN SODIUM 2 G: 2 SOLUTION INTRAVENOUS at 18:04

## 2019-08-30 RX ADMIN — DOCUSATE SODIUM 100 MG: 100 CAPSULE, LIQUID FILLED ORAL at 19:50

## 2019-08-30 RX ADMIN — FENTANYL CITRATE 100 MCG: 50 INJECTION, SOLUTION INTRAMUSCULAR; INTRAVENOUS at 11:00

## 2019-08-30 RX ADMIN — DEXAMETHASONE SODIUM PHOSPHATE 10 MG: 10 INJECTION INTRAMUSCULAR; INTRAVENOUS at 10:44

## 2019-08-30 RX ADMIN — MIDAZOLAM 2 MG: 1 INJECTION INTRAMUSCULAR; INTRAVENOUS at 09:13

## 2019-08-30 RX ADMIN — LIDOCAINE HYDROCHLORIDE 100 MG: 20 INJECTION, SOLUTION INTRAVENOUS at 10:44

## 2019-08-30 RX ADMIN — IPRATROPIUM BROMIDE AND ALBUTEROL SULFATE 1 AMPULE: .5; 3 SOLUTION RESPIRATORY (INHALATION) at 21:40

## 2019-08-30 RX ADMIN — SODIUM CHLORIDE: 9 INJECTION, SOLUTION INTRAVENOUS at 07:48

## 2019-08-30 RX ADMIN — KETOROLAC TROMETHAMINE 15 MG: 30 INJECTION, SOLUTION INTRAMUSCULAR; INTRAVENOUS at 17:54

## 2019-08-30 RX ADMIN — ROCURONIUM BROMIDE 50 MG: 10 INJECTION, SOLUTION INTRAVENOUS at 10:44

## 2019-08-30 RX ADMIN — OXYCODONE HYDROCHLORIDE 5 MG: 5 TABLET ORAL at 19:50

## 2019-08-30 RX ADMIN — HYDROMORPHONE HYDROCHLORIDE 0.5 MG: 1 INJECTION, SOLUTION INTRAMUSCULAR; INTRAVENOUS; SUBCUTANEOUS at 12:28

## 2019-08-30 RX ADMIN — Medication 10 ML: at 19:51

## 2019-08-30 RX ADMIN — ROPIVACAINE HYDROCHLORIDE 30 ML: 5 INJECTION, SOLUTION EPIDURAL; INFILTRATION; PERINEURAL at 09:43

## 2019-08-30 RX ADMIN — GABAPENTIN 600 MG: 300 CAPSULE ORAL at 07:48

## 2019-08-30 RX ADMIN — OXYCODONE HYDROCHLORIDE 10 MG: 10 TABLET, FILM COATED, EXTENDED RELEASE ORAL at 07:48

## 2019-08-30 RX ADMIN — Medication 0.6 MG: at 11:52

## 2019-08-30 RX ADMIN — ACETAMINOPHEN 650 MG: 325 TABLET, FILM COATED ORAL at 17:53

## 2019-08-30 RX ADMIN — IPRATROPIUM BROMIDE AND ALBUTEROL SULFATE 1 AMPULE: .5; 3 SOLUTION RESPIRATORY (INHALATION) at 16:31

## 2019-08-30 RX ADMIN — ROPIVACAINE HYDROCHLORIDE 10 ML/HR: 2 INJECTION, SOLUTION EPIDURAL; INFILTRATION; PERINEURAL at 11:01

## 2019-08-30 RX ADMIN — SODIUM CHLORIDE: 9 INJECTION, SOLUTION INTRAVENOUS at 10:31

## 2019-08-30 RX ADMIN — CHLORHEXIDINE GLUCONATE 0.12% ORAL RINSE 15 ML: 1.2 LIQUID ORAL at 07:48

## 2019-08-30 RX ADMIN — CELECOXIB 200 MG: 100 CAPSULE ORAL at 07:48

## 2019-08-30 RX ADMIN — MAGNESIUM SULFATE HEPTAHYDRATE 2 G: 500 INJECTION, SOLUTION INTRAMUSCULAR; INTRAVENOUS at 10:50

## 2019-08-30 RX ADMIN — SODIUM CHLORIDE: 9 INJECTION, SOLUTION INTRAVENOUS at 13:30

## 2019-08-30 RX ADMIN — ONDANSETRON HYDROCHLORIDE 4 MG: 2 INJECTION, SOLUTION INTRAMUSCULAR; INTRAVENOUS at 10:44

## 2019-08-30 RX ADMIN — CEFAZOLIN SODIUM 2 G: 2 SOLUTION INTRAVENOUS at 10:43

## 2019-08-30 ASSESSMENT — PULMONARY FUNCTION TESTS
PIF_VALUE: 28
PIF_VALUE: 28
PIF_VALUE: 27
PIF_VALUE: 0
PIF_VALUE: 28
PIF_VALUE: 26
PIF_VALUE: 28
PIF_VALUE: 31
PIF_VALUE: 28
PIF_VALUE: 28
PIF_VALUE: 16
PIF_VALUE: 27
PIF_VALUE: 24
PIF_VALUE: 23
PIF_VALUE: 24
PIF_VALUE: 27
PIF_VALUE: 17
PIF_VALUE: 23
PIF_VALUE: 28
PIF_VALUE: 3
PIF_VALUE: 25
PIF_VALUE: 23
PIF_VALUE: 28
PIF_VALUE: 3
PIF_VALUE: 28
PIF_VALUE: 25
PIF_VALUE: 24
PIF_VALUE: 28
PIF_VALUE: 28
PIF_VALUE: 25
PIF_VALUE: 24
PIF_VALUE: 25
PIF_VALUE: 0
PIF_VALUE: 11
PIF_VALUE: 25
PIF_VALUE: 28
PIF_VALUE: 28
PIF_VALUE: 1
PIF_VALUE: 28
PIF_VALUE: 23
PIF_VALUE: 28
PIF_VALUE: 27
PIF_VALUE: 28
PIF_VALUE: 22
PIF_VALUE: 28
PIF_VALUE: 27
PIF_VALUE: 25
PIF_VALUE: 19
PIF_VALUE: 28
PIF_VALUE: 27
PIF_VALUE: 32
PIF_VALUE: 28
PIF_VALUE: 11
PIF_VALUE: 27
PIF_VALUE: 27
PIF_VALUE: 28
PIF_VALUE: 27
PIF_VALUE: 22
PIF_VALUE: 28
PIF_VALUE: 24
PIF_VALUE: 27
PIF_VALUE: 2
PIF_VALUE: 23
PIF_VALUE: 29
PIF_VALUE: 18
PIF_VALUE: 28
PIF_VALUE: 22
PIF_VALUE: 28

## 2019-08-30 ASSESSMENT — PAIN DESCRIPTION - ORIENTATION
ORIENTATION: RIGHT

## 2019-08-30 ASSESSMENT — PAIN DESCRIPTION - PAIN TYPE
TYPE: SURGICAL PAIN

## 2019-08-30 ASSESSMENT — PAIN DESCRIPTION - LOCATION
LOCATION: CHEST

## 2019-08-30 ASSESSMENT — PAIN SCALES - GENERAL
PAINLEVEL_OUTOF10: 2
PAINLEVEL_OUTOF10: 6
PAINLEVEL_OUTOF10: 0
PAINLEVEL_OUTOF10: 0
PAINLEVEL_OUTOF10: 6
PAINLEVEL_OUTOF10: 2
PAINLEVEL_OUTOF10: 0
PAINLEVEL_OUTOF10: 7
PAINLEVEL_OUTOF10: 0
PAINLEVEL_OUTOF10: 0
PAINLEVEL_OUTOF10: 2
PAINLEVEL_OUTOF10: 2
PAINLEVEL_OUTOF10: 4
PAINLEVEL_OUTOF10: 8

## 2019-08-30 ASSESSMENT — PAIN DESCRIPTION - DESCRIPTORS
DESCRIPTORS: DISCOMFORT

## 2019-08-30 ASSESSMENT — PAIN - FUNCTIONAL ASSESSMENT: PAIN_FUNCTIONAL_ASSESSMENT: 0-10

## 2019-08-30 NOTE — OP NOTE
for reoperation, hemothorax, pneumothorax,  stroke, myocardial infarction, and death and the patient agreed to  proceed. FINDINGS:  On right robotic VATS, the lungs showed evidence of extensive  smoking damage. Wedge resection right upper lobe showed necrotizing  granuloma, so no lobectomy was performed. Multiple lymph nodes were  also removed while awaiting for pathology. DESCRIPTION OF PROCEDURE:  After adequate informed consent was obtained  and adequate preoperative antibiotics were given, the patient was  brought to the operating room in stable condition. He was laid in the  supine position and induced under general endotracheal anesthesia by the  anesthesia staff. Miranda catheter and adequate monitoring lines were  placed. I performed bronchoscopy, which revealed the tube being in good  position. The patient was turned to left lateral decubitus position  with the right side up. All pressure points were padded. Right chest  was prepped and draped in the usual sterile fashion. The four robotic  ports and the 12 mm access port were placed in the usual fashion. The  robot was docked and visualization inside the chest revealed the above  findings. The mass was easily identifiable and wedge resection was  performed using Endo BRINA staplers. Specimen was put into bag and  removed. We then released the inferior pulmonary ligament and carried  our dissection posteriorly to subcarinal space. Also, dissected out the  entire superior mediastinal lymph node packet and I completely dissected  out the superior pulmonary vein wile awaiting for path. Path report  came back as necrotizing granuloma, therefore, we elected to stop at  this point. The superior mediastinal and subcarinal space were packed  with Surgicel. A 28-American chest tube was placed in the apex. All the  ports were removed under direct vision. The incision was closed in  multiple layers with absorbable stitch.   Dry sterile dressings

## 2019-08-30 NOTE — PROGRESS NOTES
Pt admitted to same day surgery. Pt alert/oriented x3. Respirations non-labored. Denies shortness of breath/chest pain. Skin warm/dry. Denies rash/open wound. No distress noted. Pre-op education provided to pt and family. Side rails x2. Call light in reach. Will continue to monitor.   Vivian Preston RN

## 2019-08-30 NOTE — CONSULTS
16   Wt 186 lb (84.4 kg)   SpO2 95%   BMI 25.94 kg/m²    24HR INTAKE/OUTPUT:      Intake/Output Summary (Last 24 hours) at 8/30/2019 1516  Last data filed at 8/30/2019 1400  Gross per 24 hour   Intake 950 ml   Output 490 ml   Net 460 ml         General: No distress. Alert. Eyes: PERRL. No sclera icterus. ENT: No discharge. Pharynx clear. Nasal septum midline no teeth  Neck: Trachea midline. Normal thyroid. no JVD  Resp: No accessory muscle use. No crackles. No wheezing. No rhonchi. Symmetrical exansion has bandages on the right side epidural catheter bandage in the back  CV: PMI non displaced. Regular rate. Regular rhythm. No mumur or rub. ABD: Non-tender. Non-distended. No organmegaly. Normal bowel sounds. Skin: Warm and dry. No rash on exposed extremities. Lymph: No cervical LAD. No supraclavicular LAD. Ext: No joint deformity. No clubbing. No cyanosis. No edema  Neuro: Awake. Follows commands. No focal deficits. Moves all ext     Lab Results:    CBC:   Recent Labs     08/28/19  0740 08/30/19  1240   WBC 14.3* 14.1*   HGB 15.3 14.5   HCT 46.5 45.5   MCV 88.9 90.8    270     BMP:   Recent Labs     08/28/19  0740 08/30/19  1240    136   K 4.8 4.3    103   CO2 28 24   BUN 14 12   CREATININE 0.9 0.7      ALB:3,BILIDIR:3,BILITOT:3,ALKPHOS:3)@  PT/INR:   Recent Labs     08/28/19  0740   PROTIME 11.2   INR 1.0       Cultures:  -    Films:  CXR dated 8/30/2019 was reviewed by myself and shows Right chest tube in place some mild atelectasis left lung base    CT chest 6/24 2019 multiple nodules     Assessment/Plan:     48y.o. year old male 120-pack-year smoking history smoker with h/o lung cancer in both his mom and dad      6/22/2019 CT chest showing 12 mm right apex with scattered nodules 3 to 5 mm. 1.7 cm RUL nodule with   positive PET scan SUV 1.8    1. 8/30 s/p R robotic VATS with wedge resection with mediastinal lymph node dissection  .   Frozen section showing necrotizing granuloma

## 2019-08-31 ENCOUNTER — APPOINTMENT (OUTPATIENT)
Dept: GENERAL RADIOLOGY | Age: 54
DRG: 167 | End: 2019-08-31
Attending: THORACIC SURGERY (CARDIOTHORACIC VASCULAR SURGERY)
Payer: COMMERCIAL

## 2019-08-31 LAB
ANION GAP SERPL CALCULATED.3IONS-SCNC: 13 MMOL/L (ref 7–16)
BUN BLDV-MCNC: 15 MG/DL (ref 6–20)
CALCIUM SERPL-MCNC: 9 MG/DL (ref 8.6–10.2)
CHLORIDE BLD-SCNC: 98 MMOL/L (ref 98–107)
CO2: 24 MMOL/L (ref 22–29)
CREAT SERPL-MCNC: 0.8 MG/DL (ref 0.7–1.2)
GFR AFRICAN AMERICAN: >60
GFR NON-AFRICAN AMERICAN: >60 ML/MIN/1.73
GLUCOSE BLD-MCNC: 117 MG/DL (ref 74–99)
HCT VFR BLD CALC: 43.7 % (ref 37–54)
HEMOGLOBIN: 14.3 G/DL (ref 12.5–16.5)
MCH RBC QN AUTO: 29.1 PG (ref 26–35)
MCHC RBC AUTO-ENTMCNC: 32.7 % (ref 32–34.5)
MCV RBC AUTO: 88.8 FL (ref 80–99.9)
PDW BLD-RTO: 13.8 FL (ref 11.5–15)
PLATELET # BLD: 288 E9/L (ref 130–450)
PMV BLD AUTO: 9.5 FL (ref 7–12)
POTASSIUM SERPL-SCNC: 4.4 MMOL/L (ref 3.5–5)
RBC # BLD: 4.92 E12/L (ref 3.8–5.8)
SODIUM BLD-SCNC: 135 MMOL/L (ref 132–146)
WBC # BLD: 18.8 E9/L (ref 4.5–11.5)

## 2019-08-31 PROCEDURE — 36415 COLL VENOUS BLD VENIPUNCTURE: CPT

## 2019-08-31 PROCEDURE — 6360000002 HC RX W HCPCS: Performed by: NURSE PRACTITIONER

## 2019-08-31 PROCEDURE — 6370000000 HC RX 637 (ALT 250 FOR IP): Performed by: NURSE PRACTITIONER

## 2019-08-31 PROCEDURE — 2500000003 HC RX 250 WO HCPCS: Performed by: PHYSICIAN ASSISTANT

## 2019-08-31 PROCEDURE — 2580000003 HC RX 258: Performed by: NURSE PRACTITIONER

## 2019-08-31 PROCEDURE — 94640 AIRWAY INHALATION TREATMENT: CPT

## 2019-08-31 PROCEDURE — 71045 X-RAY EXAM CHEST 1 VIEW: CPT

## 2019-08-31 PROCEDURE — 80048 BASIC METABOLIC PNL TOTAL CA: CPT

## 2019-08-31 PROCEDURE — 2700000000 HC OXYGEN THERAPY PER DAY

## 2019-08-31 PROCEDURE — 2140000000 HC CCU INTERMEDIATE R&B

## 2019-08-31 PROCEDURE — 85027 COMPLETE CBC AUTOMATED: CPT

## 2019-08-31 RX ORDER — ALBUTEROL SULFATE 2.5 MG/3ML
2.5 SOLUTION RESPIRATORY (INHALATION) EVERY 6 HOURS PRN
Status: DISCONTINUED | OUTPATIENT
Start: 2019-08-31 | End: 2019-09-04 | Stop reason: HOSPADM

## 2019-08-31 RX ADMIN — KETOROLAC TROMETHAMINE 15 MG: 30 INJECTION, SOLUTION INTRAMUSCULAR; INTRAVENOUS at 00:07

## 2019-08-31 RX ADMIN — Medication 10 ML: at 09:21

## 2019-08-31 RX ADMIN — KETOROLAC TROMETHAMINE 15 MG: 30 INJECTION, SOLUTION INTRAMUSCULAR; INTRAVENOUS at 18:39

## 2019-08-31 RX ADMIN — CEFAZOLIN SODIUM 2 G: 2 SOLUTION INTRAVENOUS at 02:26

## 2019-08-31 RX ADMIN — OXYCODONE HYDROCHLORIDE 5 MG: 5 TABLET ORAL at 16:37

## 2019-08-31 RX ADMIN — ACETAMINOPHEN 650 MG: 325 TABLET, FILM COATED ORAL at 03:06

## 2019-08-31 RX ADMIN — TUBERCULIN PURIFIED PROTEIN DERIVATIVE 5 UNITS: 5 INJECTION INTRADERMAL at 12:15

## 2019-08-31 RX ADMIN — KETOROLAC TROMETHAMINE 15 MG: 30 INJECTION, SOLUTION INTRAMUSCULAR; INTRAVENOUS at 12:08

## 2019-08-31 RX ADMIN — DOCUSATE SODIUM 100 MG: 100 CAPSULE, LIQUID FILLED ORAL at 09:20

## 2019-08-31 RX ADMIN — OXYCODONE HYDROCHLORIDE 5 MG: 5 TABLET ORAL at 20:40

## 2019-08-31 RX ADMIN — MUPIROCIN: 20 OINTMENT TOPICAL at 09:20

## 2019-08-31 RX ADMIN — IPRATROPIUM BROMIDE AND ALBUTEROL SULFATE 1 AMPULE: .5; 3 SOLUTION RESPIRATORY (INHALATION) at 09:27

## 2019-08-31 RX ADMIN — IPRATROPIUM BROMIDE AND ALBUTEROL SULFATE 1 AMPULE: .5; 3 SOLUTION RESPIRATORY (INHALATION) at 17:26

## 2019-08-31 RX ADMIN — IPRATROPIUM BROMIDE AND ALBUTEROL SULFATE 1 AMPULE: .5; 3 SOLUTION RESPIRATORY (INHALATION) at 14:12

## 2019-08-31 RX ADMIN — Medication 10 ML: at 00:08

## 2019-08-31 RX ADMIN — DOCUSATE SODIUM 100 MG: 100 CAPSULE, LIQUID FILLED ORAL at 20:40

## 2019-08-31 RX ADMIN — SENNOSIDES 8.6 MG: 8.6 TABLET, FILM COATED ORAL at 20:40

## 2019-08-31 RX ADMIN — ACETAMINOPHEN 650 MG: 325 TABLET, FILM COATED ORAL at 09:20

## 2019-08-31 RX ADMIN — ENOXAPARIN SODIUM 40 MG: 40 INJECTION SUBCUTANEOUS at 09:21

## 2019-08-31 RX ADMIN — MUPIROCIN: 20 OINTMENT TOPICAL at 20:40

## 2019-08-31 RX ADMIN — OXYCODONE HYDROCHLORIDE 5 MG: 5 TABLET ORAL at 01:06

## 2019-08-31 RX ADMIN — OXYCODONE HYDROCHLORIDE 5 MG: 5 TABLET ORAL at 10:01

## 2019-08-31 RX ADMIN — Medication 10 ML: at 02:27

## 2019-08-31 RX ADMIN — Medication 10 ML: at 06:28

## 2019-08-31 RX ADMIN — Medication 10 ML: at 20:43

## 2019-08-31 RX ADMIN — KETOROLAC TROMETHAMINE 15 MG: 30 INJECTION, SOLUTION INTRAMUSCULAR; INTRAVENOUS at 06:28

## 2019-08-31 RX ADMIN — OXYCODONE HYDROCHLORIDE 5 MG: 5 TABLET ORAL at 05:10

## 2019-08-31 ASSESSMENT — PAIN DESCRIPTION - PROGRESSION
CLINICAL_PROGRESSION: GRADUALLY IMPROVING
CLINICAL_PROGRESSION: GRADUALLY IMPROVING
CLINICAL_PROGRESSION: GRADUALLY WORSENING

## 2019-08-31 ASSESSMENT — PAIN SCALES - GENERAL
PAINLEVEL_OUTOF10: 4
PAINLEVEL_OUTOF10: 5
PAINLEVEL_OUTOF10: 6
PAINLEVEL_OUTOF10: 9
PAINLEVEL_OUTOF10: 6
PAINLEVEL_OUTOF10: 7
PAINLEVEL_OUTOF10: 2
PAINLEVEL_OUTOF10: 3
PAINLEVEL_OUTOF10: 0
PAINLEVEL_OUTOF10: 2
PAINLEVEL_OUTOF10: 7
PAINLEVEL_OUTOF10: 4
PAINLEVEL_OUTOF10: 2
PAINLEVEL_OUTOF10: 6
PAINLEVEL_OUTOF10: 6
PAINLEVEL_OUTOF10: 3

## 2019-08-31 ASSESSMENT — PAIN DESCRIPTION - ONSET
ONSET: ON-GOING
ONSET: ON-GOING

## 2019-08-31 ASSESSMENT — PAIN DESCRIPTION - LOCATION
LOCATION: RIB CAGE
LOCATION: RIB CAGE

## 2019-08-31 ASSESSMENT — PAIN DESCRIPTION - ORIENTATION
ORIENTATION: RIGHT
ORIENTATION: RIGHT

## 2019-08-31 ASSESSMENT — PAIN DESCRIPTION - DESCRIPTORS
DESCRIPTORS: ACHING;CONSTANT;DISCOMFORT
DESCRIPTORS: ACHING;SHARP;CONSTANT

## 2019-08-31 ASSESSMENT — PAIN DESCRIPTION - PAIN TYPE
TYPE: ACUTE PAIN;SURGICAL PAIN
TYPE: ACUTE PAIN;SURGICAL PAIN

## 2019-08-31 ASSESSMENT — PAIN - FUNCTIONAL ASSESSMENT
PAIN_FUNCTIONAL_ASSESSMENT: ACTIVITIES ARE NOT PREVENTED
PAIN_FUNCTIONAL_ASSESSMENT: ACTIVITIES ARE NOT PREVENTED

## 2019-08-31 ASSESSMENT — PAIN DESCRIPTION - FREQUENCY
FREQUENCY: CONTINUOUS
FREQUENCY: CONTINUOUS

## 2019-08-31 NOTE — PROGRESS NOTES
POD#1  Awake, alert. No complaints other than expected post op pain    Vitals:    08/30/19 2140 08/30/19 2345 08/31/19 0400 08/31/19 0810   BP:  (!) 117/57 123/81 134/77   Pulse:  89 74 73   Resp:  16 18 18   Temp:  99 °F (37.2 °C) 98.6 °F (37 °C) 98.1 °F (36.7 °C)   TempSrc:  Temporal Temporal Temporal   SpO2: 97% 99% 96% 93%   Weight:           Intake/Output Summary (Last 24 hours) at 8/31/2019 0905  Last data filed at 8/31/2019 0859  Gross per 24 hour   Intake 1900 ml   Output 2835 ml   Net -935 ml     Recent Labs     08/31/19  0645   HGB 14.3   HCT 43.7   BUN 15   CREATININE 0.8        PE  Cardiac: RRR  Lungs: decreased bases  Chest incisions clean.  Tube in place  Abd: Soft, +BS  Ext: WILSON    A/P: Stable s/p right robotic VATS, RUL wedge POD#1   Acute blood loss anemia secondary to  Surgery- stable  Epidural out- start lovenox   CT with leak- cont to Water seal  Per Dr Lindsey Caro - will check PPD  Smoking cessation education provided   Increase activity as tolerated   Encourage incentive spirometry  This patient's case and care plan was discussed with the attending surgeon

## 2019-09-01 ENCOUNTER — APPOINTMENT (OUTPATIENT)
Dept: GENERAL RADIOLOGY | Age: 54
DRG: 167 | End: 2019-09-01
Attending: THORACIC SURGERY (CARDIOTHORACIC VASCULAR SURGERY)
Payer: COMMERCIAL

## 2019-09-01 LAB
ANION GAP SERPL CALCULATED.3IONS-SCNC: 12 MMOL/L (ref 7–16)
BUN BLDV-MCNC: 14 MG/DL (ref 6–20)
CALCIUM SERPL-MCNC: 8.7 MG/DL (ref 8.6–10.2)
CHLORIDE BLD-SCNC: 101 MMOL/L (ref 98–107)
CO2: 25 MMOL/L (ref 22–29)
CREAT SERPL-MCNC: 0.8 MG/DL (ref 0.7–1.2)
GFR AFRICAN AMERICAN: >60
GFR NON-AFRICAN AMERICAN: >60 ML/MIN/1.73
GLUCOSE BLD-MCNC: 95 MG/DL (ref 74–99)
HCT VFR BLD CALC: 44.3 % (ref 37–54)
HEMOGLOBIN: 14.6 G/DL (ref 12.5–16.5)
MCH RBC QN AUTO: 29.1 PG (ref 26–35)
MCHC RBC AUTO-ENTMCNC: 33 % (ref 32–34.5)
MCV RBC AUTO: 88.4 FL (ref 80–99.9)
PDW BLD-RTO: 13.8 FL (ref 11.5–15)
PLATELET # BLD: 271 E9/L (ref 130–450)
PMV BLD AUTO: 10 FL (ref 7–12)
POTASSIUM SERPL-SCNC: 4.1 MMOL/L (ref 3.5–5)
RBC # BLD: 5.01 E12/L (ref 3.8–5.8)
SODIUM BLD-SCNC: 138 MMOL/L (ref 132–146)
WBC # BLD: 13.1 E9/L (ref 4.5–11.5)

## 2019-09-01 PROCEDURE — 36415 COLL VENOUS BLD VENIPUNCTURE: CPT

## 2019-09-01 PROCEDURE — 94640 AIRWAY INHALATION TREATMENT: CPT

## 2019-09-01 PROCEDURE — 6360000002 HC RX W HCPCS: Performed by: NURSE PRACTITIONER

## 2019-09-01 PROCEDURE — 71046 X-RAY EXAM CHEST 2 VIEWS: CPT

## 2019-09-01 PROCEDURE — 6370000000 HC RX 637 (ALT 250 FOR IP): Performed by: NURSE PRACTITIONER

## 2019-09-01 PROCEDURE — 6370000000 HC RX 637 (ALT 250 FOR IP): Performed by: CLINICAL NURSE SPECIALIST

## 2019-09-01 PROCEDURE — 80048 BASIC METABOLIC PNL TOTAL CA: CPT

## 2019-09-01 PROCEDURE — 2580000003 HC RX 258: Performed by: NURSE PRACTITIONER

## 2019-09-01 PROCEDURE — 2140000000 HC CCU INTERMEDIATE R&B

## 2019-09-01 PROCEDURE — 85027 COMPLETE CBC AUTOMATED: CPT

## 2019-09-01 RX ADMIN — OXYCODONE HYDROCHLORIDE 5 MG: 5 TABLET ORAL at 17:38

## 2019-09-01 RX ADMIN — MUPIROCIN: 20 OINTMENT TOPICAL at 09:27

## 2019-09-01 RX ADMIN — ENOXAPARIN SODIUM 40 MG: 40 INJECTION SUBCUTANEOUS at 09:26

## 2019-09-01 RX ADMIN — DOCUSATE SODIUM 100 MG: 100 CAPSULE, LIQUID FILLED ORAL at 09:26

## 2019-09-01 RX ADMIN — OXYCODONE HYDROCHLORIDE 5 MG: 5 TABLET ORAL at 21:47

## 2019-09-01 RX ADMIN — Medication 10 ML: at 21:47

## 2019-09-01 RX ADMIN — TIOTROPIUM BROMIDE 18 MCG: 18 CAPSULE ORAL; RESPIRATORY (INHALATION) at 12:35

## 2019-09-01 RX ADMIN — IPRATROPIUM BROMIDE AND ALBUTEROL SULFATE 1 AMPULE: .5; 3 SOLUTION RESPIRATORY (INHALATION) at 09:07

## 2019-09-01 RX ADMIN — DOCUSATE SODIUM 100 MG: 100 CAPSULE, LIQUID FILLED ORAL at 21:47

## 2019-09-01 RX ADMIN — SENNOSIDES 8.6 MG: 8.6 TABLET, FILM COATED ORAL at 21:47

## 2019-09-01 RX ADMIN — KETOROLAC TROMETHAMINE 15 MG: 30 INJECTION, SOLUTION INTRAMUSCULAR; INTRAVENOUS at 07:05

## 2019-09-01 RX ADMIN — KETOROLAC TROMETHAMINE 15 MG: 30 INJECTION, SOLUTION INTRAMUSCULAR; INTRAVENOUS at 12:35

## 2019-09-01 RX ADMIN — MUPIROCIN: 20 OINTMENT TOPICAL at 21:47

## 2019-09-01 RX ADMIN — Medication 10 ML: at 09:26

## 2019-09-01 RX ADMIN — OXYCODONE HYDROCHLORIDE 5 MG: 5 TABLET ORAL at 05:04

## 2019-09-01 RX ADMIN — KETOROLAC TROMETHAMINE 15 MG: 30 INJECTION, SOLUTION INTRAMUSCULAR; INTRAVENOUS at 00:11

## 2019-09-01 RX ADMIN — ACETAMINOPHEN 650 MG: 325 TABLET, FILM COATED ORAL at 09:26

## 2019-09-01 ASSESSMENT — PAIN SCALES - GENERAL
PAINLEVEL_OUTOF10: 3
PAINLEVEL_OUTOF10: 7
PAINLEVEL_OUTOF10: 9
PAINLEVEL_OUTOF10: 4
PAINLEVEL_OUTOF10: 6
PAINLEVEL_OUTOF10: 7
PAINLEVEL_OUTOF10: 0
PAINLEVEL_OUTOF10: 3
PAINLEVEL_OUTOF10: 5

## 2019-09-01 NOTE — PROGRESS NOTES
shifts: In: 1440 [P.O.:1440]  Out: 1370 [Urine:1300; Chest Tube:70]  I/O this shift:  In: 240 [P.O.:240]  Out: 27 [Chest Tube:27]     Results:  CBC:   Recent Labs     19  1240 19  0645 19  0622   WBC 14.1* 18.8* 13.1*   HGB 14.5 14.3 14.6   HCT 45.5 43.7 44.3   MCV 90.8 88.8 88.4    288 271     BMP:   Recent Labs     19  1240 19  0645 19  0622    135 138   K 4.3 4.4 4.1    98 101   CO2 24 24 25   BUN 12 15 14   CREATININE 0.7 0.8 0.8     LFT: No results for input(s): ALKPHOS, ALT, AST, PROT, BILITOT, BILIDIR, LABALBU in the last 72 hours. PT/INR: No results for input(s): PROTIME, INR in the last 72 hours. Procalcitonin: No results for input(s): PROCAL in the last 72 hours. Cultures:  MRSA screening CX-Negative       ABG:   No results for input(s): PH, PO2, PCO2, HCO3, BE, O2SAT in the last 72 hours. Films:  Xr Chest Standard   19:  Interval removal of right-sided chest tube. No significant expansion   of the lung into the right apex. Recommend continued follow-up and   decubitus image to rule out pneumothorax. 19:  1. Stable, enlarged cardiomediastinal silhouette with a right-sided   chest tube without evidence of underlying pneumothorax. 2. Central pulmonary vascular congestion. 3. Bibasilar airspace disease, nonspecific finding, findings can be   seen infiltrate/pneumonia/atelectasis, left greater than right. Result Date: 2019  Patient MRN: [id number] : 1965 Age:  48 years Gender: Male Order Date: 2019 7:17 AM Exam: XR CHEST (2 VW) Number of Views: 2 Indication:   Preoperative evaluation, no current chest complaints Comparison: CT chest 3/4/2017. Chest x-ray 2012 Findings: There is a normal cardiomediastinal silhouette with thoracic aortic vascular calcifications.  Mild flattening of the

## 2019-09-01 NOTE — PLAN OF CARE
Problem: Falls - Risk of:  Goal: Will remain free from falls  Description  Will remain free from falls  Outcome: Met This Shift     Problem: Pain:  Goal: Pain level will decrease  Description  Pain level will decrease  Outcome: Ongoing

## 2019-09-02 LAB
ANION GAP SERPL CALCULATED.3IONS-SCNC: 14 MMOL/L (ref 7–16)
BUN BLDV-MCNC: 13 MG/DL (ref 6–20)
CALCIUM SERPL-MCNC: 9.1 MG/DL (ref 8.6–10.2)
CHLORIDE BLD-SCNC: 100 MMOL/L (ref 98–107)
CO2: 25 MMOL/L (ref 22–29)
CREAT SERPL-MCNC: 0.8 MG/DL (ref 0.7–1.2)
GFR AFRICAN AMERICAN: >60
GFR NON-AFRICAN AMERICAN: >60 ML/MIN/1.73
GLUCOSE BLD-MCNC: 97 MG/DL (ref 74–99)
HCT VFR BLD CALC: 45.6 % (ref 37–54)
HEMOGLOBIN: 15.3 G/DL (ref 12.5–16.5)
MCH RBC QN AUTO: 29.4 PG (ref 26–35)
MCHC RBC AUTO-ENTMCNC: 33.6 % (ref 32–34.5)
MCV RBC AUTO: 87.5 FL (ref 80–99.9)
PDW BLD-RTO: 13.6 FL (ref 11.5–15)
PLATELET # BLD: 271 E9/L (ref 130–450)
PMV BLD AUTO: 9.4 FL (ref 7–12)
POTASSIUM SERPL-SCNC: 4.1 MMOL/L (ref 3.5–5)
RBC # BLD: 5.21 E12/L (ref 3.8–5.8)
SODIUM BLD-SCNC: 139 MMOL/L (ref 132–146)
WBC # BLD: 11.5 E9/L (ref 4.5–11.5)

## 2019-09-02 PROCEDURE — 6370000000 HC RX 637 (ALT 250 FOR IP): Performed by: NURSE PRACTITIONER

## 2019-09-02 PROCEDURE — 85027 COMPLETE CBC AUTOMATED: CPT

## 2019-09-02 PROCEDURE — 6360000002 HC RX W HCPCS: Performed by: PHYSICIAN ASSISTANT

## 2019-09-02 PROCEDURE — 2140000000 HC CCU INTERMEDIATE R&B

## 2019-09-02 PROCEDURE — 36415 COLL VENOUS BLD VENIPUNCTURE: CPT

## 2019-09-02 PROCEDURE — 2580000003 HC RX 258: Performed by: NURSE PRACTITIONER

## 2019-09-02 PROCEDURE — 6360000002 HC RX W HCPCS: Performed by: NURSE PRACTITIONER

## 2019-09-02 PROCEDURE — 80048 BASIC METABOLIC PNL TOTAL CA: CPT

## 2019-09-02 RX ORDER — KETOROLAC TROMETHAMINE 30 MG/ML
15 INJECTION, SOLUTION INTRAMUSCULAR; INTRAVENOUS EVERY 6 HOURS PRN
Status: DISCONTINUED | OUTPATIENT
Start: 2019-09-02 | End: 2019-09-04 | Stop reason: HOSPADM

## 2019-09-02 RX ADMIN — OXYCODONE HYDROCHLORIDE 5 MG: 5 TABLET ORAL at 13:12

## 2019-09-02 RX ADMIN — OXYCODONE HYDROCHLORIDE 5 MG: 5 TABLET ORAL at 21:04

## 2019-09-02 RX ADMIN — OXYCODONE HYDROCHLORIDE 5 MG: 5 TABLET ORAL at 06:37

## 2019-09-02 RX ADMIN — OXYCODONE HYDROCHLORIDE 5 MG: 5 TABLET ORAL at 02:16

## 2019-09-02 RX ADMIN — Medication 10 ML: at 21:05

## 2019-09-02 RX ADMIN — TIOTROPIUM BROMIDE 18 MCG: 18 CAPSULE ORAL; RESPIRATORY (INHALATION) at 10:12

## 2019-09-02 RX ADMIN — KETOROLAC TROMETHAMINE 15 MG: 30 INJECTION, SOLUTION INTRAMUSCULAR; INTRAVENOUS at 23:08

## 2019-09-02 RX ADMIN — DOCUSATE SODIUM 100 MG: 100 CAPSULE, LIQUID FILLED ORAL at 21:04

## 2019-09-02 RX ADMIN — MUPIROCIN: 20 OINTMENT TOPICAL at 10:13

## 2019-09-02 RX ADMIN — KETOROLAC TROMETHAMINE 15 MG: 30 INJECTION, SOLUTION INTRAMUSCULAR; INTRAVENOUS at 16:10

## 2019-09-02 RX ADMIN — Medication 10 ML: at 23:08

## 2019-09-02 RX ADMIN — SENNOSIDES 8.6 MG: 8.6 TABLET, FILM COATED ORAL at 21:04

## 2019-09-02 RX ADMIN — KETOROLAC TROMETHAMINE 15 MG: 30 INJECTION, SOLUTION INTRAMUSCULAR; INTRAVENOUS at 10:13

## 2019-09-02 RX ADMIN — Medication 10 ML: at 10:13

## 2019-09-02 ASSESSMENT — PAIN SCALES - GENERAL
PAINLEVEL_OUTOF10: 4
PAINLEVEL_OUTOF10: 9
PAINLEVEL_OUTOF10: 7
PAINLEVEL_OUTOF10: 0
PAINLEVEL_OUTOF10: 6
PAINLEVEL_OUTOF10: 6
PAINLEVEL_OUTOF10: 4
PAINLEVEL_OUTOF10: 7
PAINLEVEL_OUTOF10: 7
PAINLEVEL_OUTOF10: 2
PAINLEVEL_OUTOF10: 3
PAINLEVEL_OUTOF10: 6

## 2019-09-02 ASSESSMENT — PAIN DESCRIPTION - DESCRIPTORS
DESCRIPTORS: ACHING;CONSTANT;DISCOMFORT
DESCRIPTORS: ACHING;CONSTANT;DISCOMFORT
DESCRIPTORS: ACHING;BURNING;SORE
DESCRIPTORS: ACHING;CONSTANT;DISCOMFORT
DESCRIPTORS: ACHING;CONSTANT;DISCOMFORT

## 2019-09-02 ASSESSMENT — PAIN DESCRIPTION - PAIN TYPE
TYPE: SURGICAL PAIN

## 2019-09-02 ASSESSMENT — PAIN DESCRIPTION - FREQUENCY
FREQUENCY: CONTINUOUS

## 2019-09-02 ASSESSMENT — PAIN DESCRIPTION - PROGRESSION
CLINICAL_PROGRESSION: GRADUALLY IMPROVING
CLINICAL_PROGRESSION: NOT CHANGED
CLINICAL_PROGRESSION: GRADUALLY WORSENING
CLINICAL_PROGRESSION: NOT CHANGED

## 2019-09-02 ASSESSMENT — PAIN DESCRIPTION - ONSET
ONSET: ON-GOING

## 2019-09-02 ASSESSMENT — PAIN - FUNCTIONAL ASSESSMENT
PAIN_FUNCTIONAL_ASSESSMENT: ACTIVITIES ARE NOT PREVENTED
PAIN_FUNCTIONAL_ASSESSMENT: PREVENTS OR INTERFERES SOME ACTIVE ACTIVITIES AND ADLS

## 2019-09-02 ASSESSMENT — PAIN DESCRIPTION - LOCATION
LOCATION: RIB CAGE

## 2019-09-02 ASSESSMENT — PAIN DESCRIPTION - ORIENTATION
ORIENTATION: RIGHT

## 2019-09-02 NOTE — PLAN OF CARE
Problem: Pain:  Goal: Pain level will decrease  Description  Pain level will decrease  Outcome: Met This Shift     Problem: Falls - Risk of:  Goal: Will remain free from falls  Description  Will remain free from falls  9/2/2019 1036 by Shanika Quinn RN  Outcome: Met This Shift

## 2019-09-02 NOTE — PLAN OF CARE
Problem: Pain:  Description  Pain management should include both nonpharmacologic and pharmacologic interventions.   Goal: Control of acute pain  Description  Control of acute pain  Outcome: Met This Shift     Problem: Falls - Risk of:  Goal: Will remain free from falls  Description  Will remain free from falls  9/2/2019 0311 by Almaz Landers RN  Outcome: Met This Shift

## 2019-09-03 LAB
ANION GAP SERPL CALCULATED.3IONS-SCNC: 13 MMOL/L (ref 7–16)
BUN BLDV-MCNC: 17 MG/DL (ref 6–20)
CALCIUM SERPL-MCNC: 9.2 MG/DL (ref 8.6–10.2)
CHLORIDE BLD-SCNC: 100 MMOL/L (ref 98–107)
CO2: 24 MMOL/L (ref 22–29)
CREAT SERPL-MCNC: 0.8 MG/DL (ref 0.7–1.2)
GFR AFRICAN AMERICAN: >60
GFR NON-AFRICAN AMERICAN: >60 ML/MIN/1.73
GLUCOSE BLD-MCNC: 100 MG/DL (ref 74–99)
HCT VFR BLD CALC: 45.5 % (ref 37–54)
HEMOGLOBIN: 15.5 G/DL (ref 12.5–16.5)
MCH RBC QN AUTO: 29.4 PG (ref 26–35)
MCHC RBC AUTO-ENTMCNC: 34.1 % (ref 32–34.5)
MCV RBC AUTO: 86.2 FL (ref 80–99.9)
PDW BLD-RTO: 13.2 FL (ref 11.5–15)
PLATELET # BLD: 281 E9/L (ref 130–450)
PMV BLD AUTO: 9.4 FL (ref 7–12)
POTASSIUM SERPL-SCNC: 4.4 MMOL/L (ref 3.5–5)
RBC # BLD: 5.28 E12/L (ref 3.8–5.8)
SODIUM BLD-SCNC: 137 MMOL/L (ref 132–146)
WBC # BLD: 7.8 E9/L (ref 4.5–11.5)

## 2019-09-03 PROCEDURE — 80048 BASIC METABOLIC PNL TOTAL CA: CPT

## 2019-09-03 PROCEDURE — 6370000000 HC RX 637 (ALT 250 FOR IP): Performed by: NURSE PRACTITIONER

## 2019-09-03 PROCEDURE — 2140000000 HC CCU INTERMEDIATE R&B

## 2019-09-03 PROCEDURE — 6360000002 HC RX W HCPCS: Performed by: PHYSICIAN ASSISTANT

## 2019-09-03 PROCEDURE — 85027 COMPLETE CBC AUTOMATED: CPT

## 2019-09-03 PROCEDURE — 36415 COLL VENOUS BLD VENIPUNCTURE: CPT

## 2019-09-03 PROCEDURE — 6360000002 HC RX W HCPCS: Performed by: NURSE PRACTITIONER

## 2019-09-03 PROCEDURE — 2580000003 HC RX 258: Performed by: NURSE PRACTITIONER

## 2019-09-03 RX ADMIN — DOCUSATE SODIUM 100 MG: 100 CAPSULE, LIQUID FILLED ORAL at 08:23

## 2019-09-03 RX ADMIN — Medication 10 ML: at 08:23

## 2019-09-03 RX ADMIN — Medication 10 ML: at 20:18

## 2019-09-03 RX ADMIN — ENOXAPARIN SODIUM 40 MG: 40 INJECTION SUBCUTANEOUS at 08:23

## 2019-09-03 RX ADMIN — DOCUSATE SODIUM 100 MG: 100 CAPSULE, LIQUID FILLED ORAL at 20:19

## 2019-09-03 RX ADMIN — OXYCODONE HYDROCHLORIDE 5 MG: 5 TABLET ORAL at 02:04

## 2019-09-03 RX ADMIN — OXYCODONE HYDROCHLORIDE 5 MG: 5 TABLET ORAL at 10:36

## 2019-09-03 RX ADMIN — KETOROLAC TROMETHAMINE 15 MG: 30 INJECTION, SOLUTION INTRAMUSCULAR; INTRAVENOUS at 06:23

## 2019-09-03 RX ADMIN — KETOROLAC TROMETHAMINE 15 MG: 30 INJECTION, SOLUTION INTRAMUSCULAR; INTRAVENOUS at 17:58

## 2019-09-03 RX ADMIN — SENNOSIDES 8.6 MG: 8.6 TABLET, FILM COATED ORAL at 20:18

## 2019-09-03 RX ADMIN — Medication 10 ML: at 06:23

## 2019-09-03 RX ADMIN — TIOTROPIUM BROMIDE 18 MCG: 18 CAPSULE ORAL; RESPIRATORY (INHALATION) at 08:24

## 2019-09-03 RX ADMIN — OXYCODONE HYDROCHLORIDE 5 MG: 5 TABLET ORAL at 15:42

## 2019-09-03 RX ADMIN — OXYCODONE HYDROCHLORIDE 5 MG: 5 TABLET ORAL at 20:18

## 2019-09-03 ASSESSMENT — PAIN DESCRIPTION - ONSET
ONSET: ON-GOING

## 2019-09-03 ASSESSMENT — PAIN SCALES - GENERAL
PAINLEVEL_OUTOF10: 0
PAINLEVEL_OUTOF10: 5
PAINLEVEL_OUTOF10: 0
PAINLEVEL_OUTOF10: 0
PAINLEVEL_OUTOF10: 7
PAINLEVEL_OUTOF10: 0
PAINLEVEL_OUTOF10: 8
PAINLEVEL_OUTOF10: 0
PAINLEVEL_OUTOF10: 4
PAINLEVEL_OUTOF10: 7
PAINLEVEL_OUTOF10: 0
PAINLEVEL_OUTOF10: 0
PAINLEVEL_OUTOF10: 4

## 2019-09-03 ASSESSMENT — PAIN - FUNCTIONAL ASSESSMENT
PAIN_FUNCTIONAL_ASSESSMENT: PREVENTS OR INTERFERES SOME ACTIVE ACTIVITIES AND ADLS
PAIN_FUNCTIONAL_ASSESSMENT: PREVENTS OR INTERFERES SOME ACTIVE ACTIVITIES AND ADLS
PAIN_FUNCTIONAL_ASSESSMENT: ACTIVITIES ARE NOT PREVENTED

## 2019-09-03 ASSESSMENT — PAIN DESCRIPTION - DESCRIPTORS
DESCRIPTORS: ACHING;DISCOMFORT;STABBING
DESCRIPTORS: ACHING;DISCOMFORT;THROBBING
DESCRIPTORS: SORE;ACHING;DISCOMFORT

## 2019-09-03 ASSESSMENT — PAIN DESCRIPTION - FREQUENCY
FREQUENCY: CONTINUOUS

## 2019-09-03 ASSESSMENT — PAIN DESCRIPTION - ORIENTATION
ORIENTATION: RIGHT

## 2019-09-03 ASSESSMENT — PAIN DESCRIPTION - LOCATION
LOCATION: RIB CAGE
LOCATION: RIB CAGE;INCISION
LOCATION: RIB CAGE

## 2019-09-03 ASSESSMENT — PAIN DESCRIPTION - PROGRESSION
CLINICAL_PROGRESSION: GRADUALLY IMPROVING
CLINICAL_PROGRESSION: NOT CHANGED
CLINICAL_PROGRESSION: GRADUALLY IMPROVING

## 2019-09-03 ASSESSMENT — PAIN DESCRIPTION - PAIN TYPE
TYPE: SURGICAL PAIN

## 2019-09-03 NOTE — PROGRESS NOTES
POD#4  Awake, alert. No major complaints. Vitals:    09/02/19 2308 09/03/19 0304 09/03/19 0616 09/03/19 0745   BP: 120/70 124/68  117/63   Pulse: 68 61  77   Resp: 18 18  20   Temp: 98 °F (36.7 °C) 97.5 °F (36.4 °C)  98.4 °F (36.9 °C)   TempSrc: Temporal Temporal  Temporal   SpO2: 92% 92%  92%   Weight:   177 lb (80.3 kg) 177 lb (80.3 kg)   Height:    5' 11\" (1.803 m)       Intake/Output Summary (Last 24 hours) at 9/3/2019 0850  Last data filed at 9/3/2019 0829  Gross per 24 hour   Intake 1440 ml   Output 15 ml   Net 1425 ml     Recent Labs     09/03/19  0545 09/03/19  0546   HGB  --  15.5   HCT  --  45.5   BUN 17  --    CREATININE 0.8  --         PE  Cardiac: RRR  Lungs: decreased bases, upper airway mucus congestion  Chest incisions clean.   Abd: Soft, +BS  Ext: WILSON    A/P: Stable s/p  right robotic VATS, RUL wedge , POD#4  CT remains with slight air leak, trending down slowly  Cont CT to waterseal  If not resolved by tomorrow will send home tomorrow with home chest tube device and f/u in office  This patient's case and care plan was discussed with the attending surgeon

## 2019-09-04 VITALS
OXYGEN SATURATION: 96 % | WEIGHT: 177 LBS | RESPIRATION RATE: 18 BRPM | HEIGHT: 71 IN | HEART RATE: 74 BPM | DIASTOLIC BLOOD PRESSURE: 75 MMHG | BODY MASS INDEX: 24.78 KG/M2 | SYSTOLIC BLOOD PRESSURE: 119 MMHG | TEMPERATURE: 97.5 F

## 2019-09-04 LAB
ANION GAP SERPL CALCULATED.3IONS-SCNC: 11 MMOL/L (ref 7–16)
BUN BLDV-MCNC: 21 MG/DL (ref 6–20)
CALCIUM SERPL-MCNC: 9.3 MG/DL (ref 8.6–10.2)
CHLORIDE BLD-SCNC: 100 MMOL/L (ref 98–107)
CO2: 25 MMOL/L (ref 22–29)
CREAT SERPL-MCNC: 0.8 MG/DL (ref 0.7–1.2)
GFR AFRICAN AMERICAN: >60
GFR NON-AFRICAN AMERICAN: >60 ML/MIN/1.73
GLUCOSE BLD-MCNC: 97 MG/DL (ref 74–99)
HCT VFR BLD CALC: 44.2 % (ref 37–54)
HEMOGLOBIN: 15.1 G/DL (ref 12.5–16.5)
MCH RBC QN AUTO: 29.4 PG (ref 26–35)
MCHC RBC AUTO-ENTMCNC: 34.2 % (ref 32–34.5)
MCV RBC AUTO: 86 FL (ref 80–99.9)
PDW BLD-RTO: 13.1 FL (ref 11.5–15)
PLATELET # BLD: 270 E9/L (ref 130–450)
PMV BLD AUTO: 9.3 FL (ref 7–12)
POTASSIUM SERPL-SCNC: 4.3 MMOL/L (ref 3.5–5)
RBC # BLD: 5.14 E12/L (ref 3.8–5.8)
SODIUM BLD-SCNC: 136 MMOL/L (ref 132–146)
WBC # BLD: 7.6 E9/L (ref 4.5–11.5)

## 2019-09-04 PROCEDURE — 80048 BASIC METABOLIC PNL TOTAL CA: CPT

## 2019-09-04 PROCEDURE — 85027 COMPLETE CBC AUTOMATED: CPT

## 2019-09-04 PROCEDURE — 6360000002 HC RX W HCPCS: Performed by: NURSE PRACTITIONER

## 2019-09-04 PROCEDURE — 6370000000 HC RX 637 (ALT 250 FOR IP): Performed by: NURSE PRACTITIONER

## 2019-09-04 PROCEDURE — 97161 PT EVAL LOW COMPLEX 20 MIN: CPT

## 2019-09-04 PROCEDURE — 2580000003 HC RX 258: Performed by: NURSE PRACTITIONER

## 2019-09-04 PROCEDURE — 6360000002 HC RX W HCPCS: Performed by: PHYSICIAN ASSISTANT

## 2019-09-04 PROCEDURE — 36415 COLL VENOUS BLD VENIPUNCTURE: CPT

## 2019-09-04 RX ADMIN — KETOROLAC TROMETHAMINE 15 MG: 30 INJECTION, SOLUTION INTRAMUSCULAR; INTRAVENOUS at 06:42

## 2019-09-04 RX ADMIN — Medication 10 ML: at 00:10

## 2019-09-04 RX ADMIN — KETOROLAC TROMETHAMINE 15 MG: 30 INJECTION, SOLUTION INTRAMUSCULAR; INTRAVENOUS at 00:10

## 2019-09-04 RX ADMIN — Medication 10 ML: at 06:42

## 2019-09-04 RX ADMIN — OXYCODONE HYDROCHLORIDE 5 MG: 5 TABLET ORAL at 09:49

## 2019-09-04 RX ADMIN — DOCUSATE SODIUM 100 MG: 100 CAPSULE, LIQUID FILLED ORAL at 09:17

## 2019-09-04 RX ADMIN — OXYCODONE HYDROCHLORIDE 5 MG: 5 TABLET ORAL at 04:30

## 2019-09-04 RX ADMIN — TIOTROPIUM BROMIDE 18 MCG: 18 CAPSULE ORAL; RESPIRATORY (INHALATION) at 09:17

## 2019-09-04 RX ADMIN — ENOXAPARIN SODIUM 40 MG: 40 INJECTION SUBCUTANEOUS at 09:17

## 2019-09-04 RX ADMIN — Medication 10 ML: at 09:17

## 2019-09-04 ASSESSMENT — PAIN DESCRIPTION - PROGRESSION
CLINICAL_PROGRESSION: GRADUALLY IMPROVING
CLINICAL_PROGRESSION: NOT CHANGED
CLINICAL_PROGRESSION: NOT CHANGED

## 2019-09-04 ASSESSMENT — PAIN - FUNCTIONAL ASSESSMENT
PAIN_FUNCTIONAL_ASSESSMENT: ACTIVITIES ARE NOT PREVENTED

## 2019-09-04 ASSESSMENT — PAIN DESCRIPTION - LOCATION
LOCATION: RIB CAGE;INCISION
LOCATION: RIB CAGE;INCISION
LOCATION: RIB CAGE

## 2019-09-04 ASSESSMENT — PAIN DESCRIPTION - ONSET
ONSET: ON-GOING

## 2019-09-04 ASSESSMENT — PAIN SCALES - GENERAL
PAINLEVEL_OUTOF10: 0
PAINLEVEL_OUTOF10: 5
PAINLEVEL_OUTOF10: 6
PAINLEVEL_OUTOF10: 0
PAINLEVEL_OUTOF10: 3
PAINLEVEL_OUTOF10: 2
PAINLEVEL_OUTOF10: 0
PAINLEVEL_OUTOF10: 0

## 2019-09-04 ASSESSMENT — PAIN DESCRIPTION - FREQUENCY
FREQUENCY: CONTINUOUS

## 2019-09-04 ASSESSMENT — PAIN DESCRIPTION - PAIN TYPE
TYPE: SURGICAL PAIN

## 2019-09-04 ASSESSMENT — PAIN DESCRIPTION - DESCRIPTORS
DESCRIPTORS: SHARP;ACHING;DISCOMFORT
DESCRIPTORS: ACHING;DISCOMFORT;SORE
DESCRIPTORS: SORE;ACHING;DISCOMFORT

## 2019-09-04 ASSESSMENT — PAIN DESCRIPTION - ORIENTATION
ORIENTATION: RIGHT

## 2019-09-04 NOTE — PROGRESS NOTES
Physical Therapy    [facility]  Initial Assessment    NAME: Hero Ledbetter  : 1965  MRN: [id number]    Date of Service: 2019    Evaluating Therapist: Mikey Gray PT, DPT    Room #: 3926/3691-H  DIAGNOSIS: Mass of R lung  PRECAUTIONS: Chest tube  PMHx: HLD  PROCEDURES:  R robotic VATS, RUL wedge resection    Social:  Pt lives with wife, 2 sons and 1 grandson in a 2 floor plan with 1-2 step(s) and no rail(s) to enter. Full flight of steps and 1 rail to basement. Prior to admission pt walked with no device and was Independent. Initial Evaluation  Date: 68   AM-PAC 6 Clicks    Does pt have pain? 5/10 chest tube site pain   Bed Mobility  Rolling: NT  Supine to sit: Mod Independent  Sit to supine: NT  Scooting: Independent   Transfers Sit to stand: Independent  Stand to sit: Independent  Stand pivot: Independent   Ambulation   400 feet Independently   Stair negotiation: ascended and descended 10 steps with 1 rail with Mod Independent   BLE ROM WNL   BLE strength Grossly 5/5   Balance Sitting: Independent  Standing: Independent     Pt is alert and oriented x 4  Sensation: WNL  Edema: WNL    ASSESSMENT  Pt displays functional ability as noted in the objective portion of this evaluation. Comments/Treatment:  Pt was supine in bed upon arrival, agreeable to initial evaluation. Pt reported mild lightheadedness with upright activity that improved quickly. Pt demonstrated independence with all mobility. Pt carried chest tube when ambulating. Pt returned to sitting in chair with all needs met and call light in reach. Will discontinue skilled PT as pt has no acute needs. Patient education  Pt educated on safety    Patient response to education:   Pt verbalized understanding Pt demonstrated skill Pt requires further education in this area   x x no     Pts/ family goals   1.  Return home    Patient and or family understand(s) diagnosis, prognosis, and plan of

## 2019-09-04 NOTE — PROGRESS NOTES
I/O: I/O last 3 completed shifts: In: 1260 [P.O.:1260]  Out: 460 [Urine:425; Chest Tube:35]  No intake/output data recorded. Results:  CBC:   Recent Labs     19  0530 19  0546 19  0544   WBC 11.5 7.8 7.6   HGB 15.3 15.5 15.1   HCT 45.6 45.5 44.2   MCV 87.5 86.2 86.0    281 270     BMP:   Recent Labs     19  0530 19  0545 19  0544    137 136   K 4.1 4.4 4.3    100 100   CO2 25 24 25   BUN 13 17 21*   CREATININE 0.8 0.8 0.8     LFT: No results for input(s): ALKPHOS, ALT, AST, PROT, BILITOT, BILIDIR, LABALBU in the last 72 hours. PT/INR: No results for input(s): PROTIME, INR in the last 72 hours. Procalcitonin: No results for input(s): PROCAL in the last 72 hours. Cultures:  MRSA screening CX-Negative       ABG:   No results for input(s): PH, PO2, PCO2, HCO3, BE, O2SAT in the last 72 hours. Films:  Xr Chest Standard   19:  Interval removal of right-sided chest tube. No significant expansion   of the lung into the right apex. Recommend continued follow-up and   decubitus image to rule out pneumothorax. 19:  1. Stable, enlarged cardiomediastinal silhouette with a right-sided   chest tube without evidence of underlying pneumothorax. 2. Central pulmonary vascular congestion. 3. Bibasilar airspace disease, nonspecific finding, findings can be   seen infiltrate/pneumonia/atelectasis, left greater than right. Result Date: 2019  Patient MRN: [id number] : 1965 Age:  48 years Gender: Male Order Date: 2019 7:17 AM Exam: XR CHEST (2 VW) Number of Views: 2 Indication:   Preoperative evaluation, no current chest complaints Comparison: CT chest 3/4/2017. Chest x-ray 2012 Findings: There is a normal cardiomediastinal silhouette with thoracic aortic vascular calcifications.  Mild flattening of the hemidiaphragms. Expansion retrosternal airspace. No airspace consolidation, pneumothorax or pleural effusion identified. 1. Findings suggestive of a component of chronic obstructive pulmonary disease, clinical correlation recommended. . 2. Vascular calcifications thoracic aorta. Assessment:  48y.o. year old male 120-pack-year smoking history smoker with h/o lung cancer in both his mom and dad       6/22/2019 CT chest showing 12 mm right apex with scattered nodules 3 to 5 mm. 1.7 cm RUL nodule with   positive PET scan SUV 1.8     1. Necrotizing Granuloma - non tuberculous   - s/p R robotic VATS with wedge resection with mediastinal lymph node dissection    - Frozen section showing necrotizing granuloma no evidence of cancer  2. Mild COPD with moderate reduction of DLCO  3. Nicotine addiction smoking 3 packs a day  4. Chronic Back Pain with sciatica  5.  Subcutaneous Emphysema/Crepitus    Plan:  · Keep POX 90-95%, O2 as needed, currently on room air  · Albuterol PRN  · Incruse upon D/C  · Encourage Cough and deep breath as well as IS  · CTS following  · Follow up in about 1-2 weeks s/p D/C    Electronically signed by Joseph Harvey MD on 9/4/2019 at 9:53 AM

## 2019-09-09 DIAGNOSIS — A31.9 MYCOBACTERIUM INFECTION: Primary | ICD-10-CM

## 2019-09-10 ENCOUNTER — OFFICE VISIT (OUTPATIENT)
Dept: CARDIOTHORACIC SURGERY | Age: 54
End: 2019-09-10

## 2019-09-10 VITALS
DIASTOLIC BLOOD PRESSURE: 72 MMHG | HEIGHT: 71 IN | SYSTOLIC BLOOD PRESSURE: 132 MMHG | BODY MASS INDEX: 25.34 KG/M2 | WEIGHT: 181 LBS

## 2019-09-10 DIAGNOSIS — Z96.89 CHEST TUBE IN PLACE: Primary | ICD-10-CM

## 2019-09-10 PROCEDURE — 99024 POSTOP FOLLOW-UP VISIT: CPT | Performed by: NURSE PRACTITIONER

## 2019-09-10 NOTE — DISCHARGE SUMMARY
directed    No current facility-administered medications for this encounter. Current Outpatient Medications:     albuterol sulfate  (90 Base) MCG/ACT inhaler, Inhale 2 puffs into the lungs 4 times daily as needed for Wheezing, Disp: 3 Inhaler, Rfl: 3    HYDROcodone-acetaminophen (NORCO)  MG per tablet, Take 1 tablet by mouth every 6 hours as needed for Pain for up to 30 days. , Disp: 120 tablet, Rfl: 0    gemfibrozil (LOPID) 600 MG tablet, Take 600 mg by mouth daily, Disp: , Rfl:     Umeclidinium Bromide 62.5 MCG/INH AEPB, Inhale 1 puff into the lungs daily , Disp: , Rfl:     Future Appointments   Date Time Provider Megan Vogt   9/10/2019 10:45 AM Jos Nguyen MD [facility]   9/17/2019 11:00 AM Jos Nguyen MD [facility]   9/25/2019  4:00 PM YESICA Chew         Signed:  Tiffanie Benavides  9/10/2019  8:57 AM

## 2019-09-17 ENCOUNTER — OFFICE VISIT (OUTPATIENT)
Dept: CARDIOTHORACIC SURGERY | Age: 54
End: 2019-09-17

## 2019-09-17 VITALS
RESPIRATION RATE: 16 BRPM | HEART RATE: 79 BPM | DIASTOLIC BLOOD PRESSURE: 86 MMHG | HEIGHT: 71 IN | BODY MASS INDEX: 26.6 KG/M2 | WEIGHT: 190 LBS | SYSTOLIC BLOOD PRESSURE: 127 MMHG

## 2019-09-17 DIAGNOSIS — Z09 S/P LUNG SURGERY, FOLLOW-UP EXAM: Primary | ICD-10-CM

## 2019-09-17 DIAGNOSIS — Z48.02 VISIT FOR SUTURE REMOVAL: ICD-10-CM

## 2019-09-17 PROCEDURE — 99024 POSTOP FOLLOW-UP VISIT: CPT | Performed by: NURSE PRACTITIONER

## 2019-09-19 ENCOUNTER — TELEPHONE (OUTPATIENT)
Dept: CARDIOTHORACIC SURGERY | Age: 54
End: 2019-09-19

## 2019-09-24 LAB
Lab: NORMAL
REPORT: NORMAL
THIS TEST SENT TO: NORMAL

## 2020-01-22 PROBLEM — G89.29 CHRONIC MIDLINE LOW BACK PAIN WITHOUT SCIATICA: Status: ACTIVE | Noted: 2020-01-22

## 2020-01-22 PROBLEM — M54.50 CHRONIC MIDLINE LOW BACK PAIN WITHOUT SCIATICA: Status: ACTIVE | Noted: 2020-01-22

## 2020-06-02 ENCOUNTER — APPOINTMENT (OUTPATIENT)
Dept: CT IMAGING | Age: 55
End: 2020-06-02
Payer: COMMERCIAL

## 2020-06-02 ENCOUNTER — HOSPITAL ENCOUNTER (EMERGENCY)
Age: 55
Discharge: HOME | End: 2020-06-02
Attending: EMERGENCY MEDICINE
Payer: COMMERCIAL

## 2020-06-02 ENCOUNTER — APPOINTMENT (OUTPATIENT)
Dept: GENERAL RADIOLOGY | Age: 55
End: 2020-06-02
Payer: COMMERCIAL

## 2020-06-02 VITALS
BODY MASS INDEX: 25.2 KG/M2 | OXYGEN SATURATION: 94 % | RESPIRATION RATE: 16 BRPM | WEIGHT: 180 LBS | DIASTOLIC BLOOD PRESSURE: 72 MMHG | HEART RATE: 80 BPM | HEIGHT: 71 IN | TEMPERATURE: 97.6 F | SYSTOLIC BLOOD PRESSURE: 136 MMHG

## 2020-06-02 PROCEDURE — 73200 CT UPPER EXTREMITY W/O DYE: CPT

## 2020-06-02 PROCEDURE — 6370000000 HC RX 637 (ALT 250 FOR IP): Performed by: EMERGENCY MEDICINE

## 2020-06-02 PROCEDURE — 99284 EMERGENCY DEPT VISIT MOD MDM: CPT

## 2020-06-02 PROCEDURE — 73030 X-RAY EXAM OF SHOULDER: CPT

## 2020-06-02 PROCEDURE — 73060 X-RAY EXAM OF HUMERUS: CPT

## 2020-06-02 RX ORDER — IBUPROFEN 800 MG/1
800 TABLET ORAL ONCE
Status: COMPLETED | OUTPATIENT
Start: 2020-06-02 | End: 2020-06-02

## 2020-06-02 RX ORDER — IBUPROFEN 800 MG/1
800 TABLET ORAL EVERY 6 HOURS PRN
Qty: 20 TABLET | Refills: 0 | Status: SHIPPED | OUTPATIENT
Start: 2020-06-02 | End: 2021-02-10 | Stop reason: ALTCHOICE

## 2020-06-02 RX ORDER — CETIRIZINE HYDROCHLORIDE 10 MG/1
10 TABLET ORAL DAILY
COMMUNITY
End: 2020-11-06

## 2020-06-02 RX ADMIN — IBUPROFEN 800 MG: 800 TABLET, FILM COATED ORAL at 09:17

## 2020-06-02 ASSESSMENT — PAIN DESCRIPTION - PAIN TYPE
TYPE: ACUTE PAIN
TYPE: ACUTE PAIN

## 2020-06-02 ASSESSMENT — PAIN DESCRIPTION - FREQUENCY: FREQUENCY: CONTINUOUS

## 2020-06-02 ASSESSMENT — PAIN DESCRIPTION - ONSET: ONSET: ON-GOING

## 2020-06-02 ASSESSMENT — PAIN DESCRIPTION - ORIENTATION
ORIENTATION: RIGHT;UPPER
ORIENTATION: RIGHT

## 2020-06-02 ASSESSMENT — PAIN DESCRIPTION - LOCATION
LOCATION: SHOULDER
LOCATION: ARM

## 2020-06-02 ASSESSMENT — PAIN DESCRIPTION - DESCRIPTORS
DESCRIPTORS: ACHING
DESCRIPTORS: THROBBING

## 2020-06-02 ASSESSMENT — PAIN SCALES - GENERAL
PAINLEVEL_OUTOF10: 8
PAINLEVEL_OUTOF10: 7
PAINLEVEL_OUTOF10: 7

## 2020-06-02 ASSESSMENT — PAIN - FUNCTIONAL ASSESSMENT: PAIN_FUNCTIONAL_ASSESSMENT: PREVENTS OR INTERFERES SOME ACTIVE ACTIVITIES AND ADLS

## 2020-06-02 ASSESSMENT — PAIN DESCRIPTION - PROGRESSION: CLINICAL_PROGRESSION: GRADUALLY WORSENING

## 2020-06-03 ENCOUNTER — OFFICE VISIT (OUTPATIENT)
Dept: ORTHOPEDIC SURGERY | Age: 55
End: 2020-06-03
Payer: COMMERCIAL

## 2020-06-03 VITALS — HEIGHT: 71 IN | WEIGHT: 180 LBS | BODY MASS INDEX: 25.2 KG/M2 | TEMPERATURE: 97.2 F

## 2020-06-03 PROCEDURE — 99203 OFFICE O/P NEW LOW 30 MIN: CPT | Performed by: ORTHOPAEDIC SURGERY

## 2020-06-03 NOTE — PROGRESS NOTES
New Shoulder Patient Visit     Referring Provider:   No referring provider defined for this encounter. CHIEF COMPLAINT:   Chief Complaint   Patient presents with    ED Follow-up     6/2/2020: Rupture of right proximal biceps tendon, initial encounter     Work Related Injury     6/2/2020: states that he was taking a piece out of machine, using a nimo key, was trying to loosen it but \"felt & heard a pop\" immediate pain    Shoulder Pain     Rt upper arm pain, burning and very painful; patient is not in a sling or anything; states lifting and rotation of arm is bothersome    Other     There is a note that he was seen via telehealth by PCP on 5/15/2020    Other     Rt handed        HPI:      Joycelyn Romero is a 48y.o. year old male who is seen today  for evaluation of right shoulder pain. He states that he was at work and works as a  and was doing some heavy lifting and felt a snap and also heard something in his right shoulder. Afterwards he noticed a deformity of his biceps tendon. This occurred yesterday. He went to the emergency room where images were negative. He follows up here today for further management. He does state that he had some shoulder pain ongoing for the past 4 to 6 months prior to this incident. He is right-hand dominant on exam of the shoulder,      PAST MEDICAL HISTORY  Past Medical History:   Diagnosis Date    Back pain     D/T TRAUMA FRACTURE BACK    Edentulous     Hyperlipidemia     Lung nodule        PAST SURGICAL HISTORY  Past Surgical History:   Procedure Laterality Date    CARPAL TUNNEL RELEASE Bilateral 2018 2017.   DR Galeano Hearing    COLONOSCOPY  2016    EYE SURGERY  1980    DETACHED RETINA    THORACOSCOPY Right 8/30/2019    BRONCH, ROBOTIC RIGHT VIDEO ASSISTED THORACOSCOPY, UPPER LOBE WEDGE RESECTION, POSSIBLE LOBECTOMY performed by Suzette Aguillon MD at [address] [place] [address] HISTORY   Family History   Problem Relation Age of Onset    Cancer Mother         LUNG    Cancer Father         LUNG       SOCIAL HISTORY  Social History     Occupational History    Not on file   Tobacco Use    Smoking status: Current Every Day Smoker     Packs/day: 1.50     Years: 35.00     Pack years: 52.50     Types: Cigarettes    Smokeless tobacco: Never Used    Tobacco comment: trying to quit down to pack a day from 4-5 packs   Substance and Sexual Activity    Alcohol use: Never     Frequency: Never    Drug use: Never    Sexual activity: Not on file       CURRENT MEDICATIONS     Current Outpatient Medications:     cetirizine (ZYRTEC) 10 MG tablet, Take 10 mg by mouth daily, Disp: , Rfl:     ibuprofen (ADVIL;MOTRIN) 800 MG tablet, Take 1 tablet by mouth every 6 hours as needed for Pain, Disp: 20 tablet, Rfl: 0    HYDROcodone-acetaminophen (NORCO)  MG per tablet, Take 1 tablet by mouth every 6 hours as needed for Pain for up to 30 days.  Intended supply: 30 days, Disp: 120 tablet, Rfl: 0    clarithromycin (BIAXIN) 250 MG tablet, Take 250 mg by mouth 2 times daily, Disp: , Rfl:     rifampin (RIFADIN) 150 MG capsule, Take 300 mg by mouth 2 times daily, Disp: , Rfl:     ethambutol (MYAMBUTOL) 400 MG tablet, Take 400 mg by mouth 3 times daily, Disp: , Rfl:     Umeclidinium Bromide 62.5 MCG/INH AEPB, Inhale 1 puff into the lungs daily, Disp: 7 each, Rfl: 5    albuterol sulfate  (90 Base) MCG/ACT inhaler, Inhale 2 puffs into the lungs 4 times daily as needed for Wheezing, Disp: 3 Inhaler, Rfl: 3    ALLERGIES  No Known Allergies    Controlled Substances Monitoring:        REVIEW OF SYSTEMS:     Constitutional:  Negative for weight loss, fevers, chills, fatigue  Cardiovascular: Negative for chest pain, palpitations  Pulmonary: Negative for shortness of breath, labored breathing, cough  GI: negative for abdominal pain, nausea, vomitting   MSK: per HPI  Skin: negative for rash, open wounds    All other systems reviewed and are negative PHYSICAL EXAM     Vitals:    06/03/20 0832   Temp: 97.2 °F (36.2 °C)   TempSrc: Infrared   Weight: 180 lb (81.6 kg)   Height: 5' 11\" (1.803 m)       Height: 5' 11\" (1.803 m)  Weight: [unfilled]  BMI:  Body mass index is 25.1 kg/m². General: The patient is alert and oriented x 3, appears to be stated age and in no distress. HEENT: head is normocephalic, atraumatic. EOMI. Neck: supple, trachea midline, no thyromegaly   Cardiovascular: peripheral pulses palpable. Normal Capillary refill   Respiratory: breathing unlabored, chest expansion symmetric   Skin: no rash, no open wounds, no erythema  Psych: normal affect; mood stable  Neurologic: gait normal, sensation grossly intact in extremities  MSK:    Cervical Spine: There is no tenderness to palpation along the cervical spine. Range of motion is normal.  Spurling's is negative    Shoulder Exam:   Range of motion is about 130/30/L1. There is mild pain with Jass test.  Minimal pain with Speed test.  Atwood's test is intact. Belly press is mildly painful as well as decreased motion. There is an obvious Erick deformity consistent with long head biceps tendon rupture. There is minimal anterior shoulder tenderness    IMAGING:     X-ray and CT scan from the emergency room were negative for acute abnormality. There are findings concerning for rotator cuff tear including superior humeral head migration as well as some cystic change of the greater tuberosity    Radiographic findings reviewed with patient    ASSESSMENT   Right shoulder long head biceps tendon rupture, rotator cuff tear      PLAN  We discussed his shoulder today. He has obvious long head biceps tendon rupture. This is less concerning than possible rotator cuff tear which I am also concerned about. We will order an MRI [facility] He was also given the shoulder exercises today to work on range of motion strengthening.   Follow-up after

## 2020-06-07 ENCOUNTER — HOSPITAL ENCOUNTER (OUTPATIENT)
Dept: MRI IMAGING | Age: 55
Discharge: HOME | End: 2020-06-09
Payer: COMMERCIAL

## 2020-06-07 PROCEDURE — 73221 MRI JOINT UPR EXTREM W/O DYE: CPT

## 2020-06-10 ENCOUNTER — OFFICE VISIT (OUTPATIENT)
Dept: ORTHOPEDIC SURGERY | Age: 55
End: 2020-06-10
Payer: COMMERCIAL

## 2020-06-10 VITALS — TEMPERATURE: 97.2 F | HEIGHT: 71 IN | WEIGHT: 185 LBS | BODY MASS INDEX: 25.9 KG/M2

## 2020-06-10 PROCEDURE — 99213 OFFICE O/P EST LOW 20 MIN: CPT | Performed by: ORTHOPAEDIC SURGERY

## 2020-06-30 ENCOUNTER — HOSPITAL ENCOUNTER (OUTPATIENT)
Dept: PHYSICAL THERAPY | Age: 55
Setting detail: THERAPIES SERIES
Discharge: HOME | End: 2020-06-30
Payer: COMMERCIAL

## 2020-06-30 PROCEDURE — 97161 PT EVAL LOW COMPLEX 20 MIN: CPT | Performed by: PHYSICAL THERAPIST

## 2020-06-30 PROCEDURE — 97110 THERAPEUTIC EXERCISES: CPT | Performed by: PHYSICAL THERAPIST

## 2020-06-30 ASSESSMENT — PAIN DESCRIPTION - LOCATION: LOCATION: SHOULDER

## 2020-06-30 ASSESSMENT — PAIN DESCRIPTION - PAIN TYPE: TYPE: ACUTE PAIN

## 2020-06-30 ASSESSMENT — PAIN DESCRIPTION - ORIENTATION: ORIENTATION: RIGHT

## 2020-06-30 ASSESSMENT — PAIN - FUNCTIONAL ASSESSMENT: PAIN_FUNCTIONAL_ASSESSMENT: PREVENTS OR INTERFERES SOME ACTIVE ACTIVITIES AND ADLS

## 2020-06-30 NOTE — PROGRESS NOTES
[address] [address] [place]      Phone: [phone]  Fax: [phone]    Physical Therapy Daily Treatment Note  Date:  2020    Patient Name:  Paty Poe    :  1965  MRN: [id number]    Restrictions/Precautions:    Diagnosis:  Rupture right biceps tendon  Treatment Diagnosis:    Insurance/Certification information:  [facility]  Referring Physician:  Mary Palacios MD  Plan of care signed (Y/N):    Visit# / total visits:   (18 visits approved through 20)  Pain level: 3-4/10   Time In:  805  Time Out:  845    Subjective:  See evaluation    Exercises:  Exercise/Equipment Resistance/Repetitions Other comments     Pulleys  Flex x 4 minutes      UBE 3 min fwd/3 min bwd      Wall ladder Flex and abd x 10 reps each      IR stretch with towel 10 sec x 5 reps                                                                                                                  Other Therapeutic Activities:  PT evaluation completed    Home Exercise Program:  N/A    Manual Treatments:  N/A    Modalities:  N/A    Timed Code Treatment Minutes:  20    Total Treatment Minutes:  40    Treatment/Activity Tolerance:  [x] Patient tolerated treatment well [] Patient limited by fatigue  [] Patient limited by pain  [] Patient limited by other medical complications  [] Other:     Prognosis: [x] Good [] Fair  [] Poor    Patient Requires Follow-up: [x] Yes  [] No    Plan:   [] Continue per plan of care [] Alter current plan (see comments)  [x] Plan of care initiated [] Hold pending MD visit [] Discharge  Plan for Next Session:        Electronically signed by:  Rickey Tinajero, PT [id number]

## 2020-06-30 NOTE — PROGRESS NOTES
Physical Therapy  Initial Assessment  Date: 2020  Patient Name: Boris Ramsey  MRN: [id number]  : 1965     Subjective   General  Additional Pertinent Hx: Pt suffered a tear of the right biceps tendon while at work. MRI of the shoulder also revealed a tear of the subscapularis and probable small tear of the supraspinatus. Referring Practitioner: Nina Brito MD  Referral Date : 06/10/20  Diagnosis: Rupture of the right proximal biceps tendon  PT Visit Information  Onset Date: 20  PT Insurance Information: [facility]  Total # of Visits Approved: 18  Total # of Visits to Date: 1  Plan of Care/Certification Expiration Date: 20  Subjective  Subjective: Pt reports pain in the right shoulder and in the superior scapular region into the neck. Pain Screening  Patient Currently in Pain: Yes  Pain Assessment  Pain Assessment: 0-10  Pain Level: (3-4/10)  Pain Type: Acute pain  Pain Location: Shoulder  Pain Orientation: Right  Functional Pain Assessment: Prevents or interferes some active activities and ADLs(Difficulty with overhead activities)  Vital Signs  Patient Currently in Pain: Yes    Social/Functional History  Social/Functional History  Occupation: Full time employment  Type of occupation: , currently off work  Additional Comments: Pt is right hand dominent    Objective     Observation/Palpation  Observation: Erick deformity of right biceps noted    AROM RUE (degrees)  R Shoulder Flexion 0-180: 140°  R Shoulder ABduction 0-180: 135°  R Shoulder Int Rotation  0-70: 45°  R Shoulder Ext Rotation 0-90: WNL    Strength RUE  Strength RUE: Exception  Comment: Shoulder 4/5 except IR 4-/5, elbow 4+/5     Assessment   Conditions Requiring Skilled Therapeutic Intervention  Body structures, Functions, Activity limitations: Decreased ROM; Decreased strength;Decreased endurance; Increased pain  Prognosis: Good  Decision Making: Low Complexity  REQUIRES PT FOLLOW UP: Yes  Activity Tolerance  Activity Tolerance: Patient Tolerated treatment well         Plan   Plan  Times per week: 3x/week  Plan weeks: 6 weeks  Current Treatment Recommendations: Strengthening, ROM, Endurance Training, Pain Management, Home Exercise Program, Manual Therapy - Soft Tissue Mobilization, Modalities, Patient/Caregiver Education & Training, Neuromuscular Re-education    Goals  Short term goals  Time Frame for Short term goals: 3 weeks/9 visits  Short term goal 1: Decrease c/o pain to 2-3/10  Short term goal 2: Increase right shoulder ROM by 10° to 15°  Short term goal 3: Increase R UE strength by 1/2 MMT grade  Short term goal 4: Pt will demonstrate good compliance and tolerance to HEP  Long term goals  Time Frame for Long term goals : 6 weeks/18 visits  Long term goal 1: Decrease c/o pain to 0-1/10  Long term goal 2: Increase right shoulder ROM to [facility]  Long term goal 3: Increase R UE strength to 5/5 throughout  Long term goal 4: Pt will be able to perform overhead activities with minimal to no pain       Therapy Time   Individual Concurrent Group Co-treatment   Time In 0805         Time Out 0845         Minutes 40         Timed Code Treatment Minutes: [address] [facility],[address], [address]  If you have any questions or concerns, please don't hesitate to call.   Thank you for your referral.    Physician Signature:________________________________Date:__________________  By signing above, therapists plan is approved by physician

## 2020-07-02 ENCOUNTER — HOSPITAL ENCOUNTER (OUTPATIENT)
Dept: PHYSICAL THERAPY | Age: 55
Setting detail: THERAPIES SERIES
Discharge: HOME | End: 2020-07-02
Payer: COMMERCIAL

## 2020-07-02 PROCEDURE — 97110 THERAPEUTIC EXERCISES: CPT

## 2020-07-02 NOTE — PROGRESS NOTES
[address] [address] [place]      Phone: [phone]  Fax: [phone]    Physical Therapy Daily Treatment Note  Date:  2020    Patient Name:  Jaqueline Perez    :  1965  MRN: [id number]    Restrictions/Precautions:    Diagnosis:  Rupture right biceps tendon  Treatment Diagnosis:    Insurance/Certification information:  [facility]  Referring Physician:  Niels Recio MD  Plan of care signed (Y/N):    Visit# / total visits:   (18 visits approved through 20)  Pain level: 3-4/10   Time In:  0730  Time Out:  805    Subjective:  Nothing new to report    Exercises:  Exercise/Equipment Resistance/Repetitions Other comments     Pulleys  Flex x 4 minutes      UBE 3 min fwd/3 min bwd      Wall ladder Flex and abd x 10 reps each      IR stretch with towel 10 sec x 5 reps      Supine flex w/ wand X 10 reps       Side lying ABD , ER X 10 reps each                                                                                                    Other Therapeutic Activities:  NA    Home Exercise Program:  N/A    Manual Treatments:  N/A    Modalities:  N/A    Timed Code Treatment Minutes:  35    Total Treatment Minutes:  35    Treatment/Activity Tolerance:  [] Patient tolerated treatment well [] Patient limited by fatigue  [x] Patient limited by pain  [] Patient limited by other medical complications  [x] Other: slow and guarded movements throughout session w/ R UE . Instructed pt to ice R UE at home after therapy session.      Prognosis: [x] Good [] Fair  [] Poor    Patient Requires Follow-up: [x] Yes  [] No    Plan:   [x] Continue per plan of care [] Alter current plan (see comments)  [] Plan of care initiated [] Hold pending MD visit [] Discharge    Plan for Next Session:  Add gentle isometrics to program      Electronically signed by:  Aretha Kamara, PTA [id number]

## 2020-07-06 ENCOUNTER — HOSPITAL ENCOUNTER (OUTPATIENT)
Dept: PHYSICAL THERAPY | Age: 55
Setting detail: THERAPIES SERIES
Discharge: HOME | End: 2020-07-06
Payer: COMMERCIAL

## 2020-07-06 PROCEDURE — 97110 THERAPEUTIC EXERCISES: CPT

## 2020-07-06 NOTE — PROGRESS NOTES
[address] [address] [place]      Phone: [phone]  Fax: [phone]    Physical Therapy Daily Treatment Note  Date:  2020    Patient Name:  Adam Bess    :  1965  MRN: [id number]    Restrictions/Precautions:    Diagnosis:  Rupture right biceps tendon  Treatment Diagnosis:    Insurance/Certification information:  [facility]  Referring Physician:  Kika Thorne MD  Plan of care signed (Y/N):    Visit# / total visits:  3/18 (18 visits approved through 20)  Pain level: 3-4/10   Time In:  7030  Time Out:  843    Subjective:  Pt reported pain is about the same. \" It is worse when I wake up, and then throughout the day not too bad. At night it aches and wakes me up at times. \"     Exercises:  Exercise/Equipment Resistance/Repetitions Other comments     Pulleys  Flex x 4 minutes      UBE 4 min fwd / 4 min bwd      Wall ladder Flex and abd x 10 reps each      IR stretch with towel 10 sec x 5 reps      Supine flex w/ wand X 10 reps       Side lying ABD , ER X 10 reps each      Bicep curls  x 10 reps      4 way sh. isometric  10 sec x 3 reps each                                                                                      Other Therapeutic Activities:  NA    Home Exercise Program:  N/A    Manual Treatments:  N/A    Modalities:  N/A    Timed Code Treatment Minutes:  44    Total Treatment Minutes:  44    Treatment/Activity Tolerance:  [x] Patient tolerated treatment well [] Patient limited by fatigue  [] Patient limited by pain  [] Patient limited by other medical complications  [x] Other:  Mobility remains slow and guarded throughout session w/ R UE . Instructed pt to ice R UE at home after therapy session.  Pt reported poor compliance w/ icing R sh    Prognosis: [x] Good [] Fair  [] Poor    Patient Requires Follow-up: [x] Yes  [] No    Plan:   [x] Continue per plan of care [] Alter current plan (see comments)  [] Plan of care initiated [] Hold pending MD visit []

## 2020-07-07 ENCOUNTER — HOSPITAL ENCOUNTER (OUTPATIENT)
Dept: PHYSICAL THERAPY | Age: 55
Setting detail: THERAPIES SERIES
Discharge: HOME | End: 2020-07-07
Payer: COMMERCIAL

## 2020-07-07 PROCEDURE — 97110 THERAPEUTIC EXERCISES: CPT

## 2020-07-07 NOTE — PROGRESS NOTES
[address] [address] [place]      Phone: [phone]  Fax: [phone]    Physical Therapy Daily Treatment Note  Date:  2020    Patient Name:  Ekaterina Bustamante    :  1965  MRN: [id number]    Restrictions/Precautions:    Diagnosis:  Rupture right biceps tendon  Treatment Diagnosis:    Insurance/Certification information:  [facility]  Referring Physician:  Sedrick Merida MD  Plan of care signed (Y/N):    Visit# / total visits:   (18 visits approved through 20)  Pain level: 6/10   Time In:  0800  Time Out:  840    Subjective:  Pt reported increased pain in R UE along the bicep , attributing it to the way he slept. Pt continues to report that as the day goes on it will feel better. And the mornings are the worst.      Exercises:  Exercise/Equipment Resistance/Repetitions Other comments     Pulleys  Flex x 4 minutes      UBE 4 min fwd / 4 min bwd      Wall ladder Flex and abd x 10 reps each      IR stretch with towel 10 sec x 5 reps      Supine flex w/ wand X 10 reps       Side lying ABD , ER X 10 reps each      Bicep curls  1 # x 10 reps      4 way sh. isometric  10 sec x 3 reps each            Shoulder stabilization w/ ball CW,CCW, up/down, side<>side x 10 reps each                                                                      Other Therapeutic Activities:  NA    Home Exercise Program:  N/A    Manual Treatments:  N/A    Modalities:  N/A    Timed Code Treatment Minutes:  40    Total Treatment Minutes:  40    Treatment/Activity Tolerance:  [] Patient tolerated treatment well [] Patient limited by fatigue  [x] Patient limited by pain  [] Patient limited by other medical complications  [x] Other:  Mobility remains slow and guarded throughout session w/ R UE . Instructed pt to ice R UE at home after therapy session.  Pt reported poor compliance w/ icing R sh    Prognosis: [x] Good [] Fair  [] Poor    Patient Requires Follow-up: [x] Yes  [] No    Plan:   [x] Continue per plan of care [] Alter current plan (see comments)  [] Plan of care initiated [] Hold pending MD visit [] Discharge    Plan for Next Session:        Electronically signed by:  Ronda Hairston, PTA [id number]

## 2020-07-08 ENCOUNTER — HOSPITAL ENCOUNTER (OUTPATIENT)
Dept: CT IMAGING | Age: 55
Discharge: HOME | End: 2020-07-10
Payer: COMMERCIAL

## 2020-07-08 PROCEDURE — 71250 CT THORAX DX C-: CPT

## 2020-07-10 ENCOUNTER — HOSPITAL ENCOUNTER (OUTPATIENT)
Dept: PHYSICAL THERAPY | Age: 55
Setting detail: THERAPIES SERIES
Discharge: HOME | End: 2020-07-10
Payer: COMMERCIAL

## 2020-07-10 PROCEDURE — 97110 THERAPEUTIC EXERCISES: CPT

## 2020-07-10 NOTE — PROGRESS NOTES
[address] [address] [address] [place]      Phone: [phone]  Fax: [phone]    Physical Therapy Daily Treatment Note  Date:  7/10/2020    Patient Name:  Paty Poe    :  1965  MRN: [id number]    Restrictions/Precautions:    Diagnosis:  Rupture right biceps tendon  Treatment Diagnosis:    Insurance/Certification information:  [id number]  Referring Physician:  Mary Palacios MD  Plan of care signed (Y/N):    Visit# / total visits:   (18 visits approved through 20)  Pain level: 5/10   Time In:  4148  Time Out:  844    Subjective:  Pt reported some soreness radiating from R shoulder into R upper trap and cervical area. Pt reported some relief after session    Exercises:  Exercise/Equipment Resistance/Repetitions Other comments     Pulleys  Flex x 5 minutes      UBE 4 min fwd / 4 min bwd      Wall ladder Flex and abd x 10 reps each      IR stretch with towel 10 sec x 5 reps      Supine flex w/ wand X 10 reps       Side lying ABD , ER X 10 reps each      Bicep curls  1 # 2 x 10 reps      4 way sh. isometric  10 sec x 3 - 5  reps each            Shoulder stabilization w/ ball CW,CCW, up/down, side<>side x 10 reps each                                                                      Other Therapeutic Activities:  NA    Home Exercise Program:  N/A    Manual Treatments:  N/A    Modalities:  N/A    Timed Code Treatment Minutes:  45    Total Treatment Minutes:  45    Treatment/Activity Tolerance:  [] Patient tolerated treatment well [] Patient limited by fatigue  [x] Patient limited by pain  [] Patient limited by other medical complications  [x] Other:  Mobility remains slow and guarded throughout session w/ R UE . Instructed pt to ice R UE at home after therapy session.  Pt reported poor compliance w/ icing R sh    Prognosis: [x] Good [] Fair  [] Poor    Patient Requires Follow-up: [x] Yes  [] No    Plan:   [x] Continue per plan of care [] Alter current plan (see comments)  [] Plan of care initiated [] Hold pending MD visit [] Discharge    Plan for Next Session:        Electronically signed by:  Emi Gutierrez, PTA [id number]

## 2020-07-13 ENCOUNTER — HOSPITAL ENCOUNTER (OUTPATIENT)
Dept: PHYSICAL THERAPY | Age: 55
Setting detail: THERAPIES SERIES
Discharge: HOME | End: 2020-07-13
Payer: COMMERCIAL

## 2020-07-13 NOTE — PROGRESS NOTES
[address]      Phone: [phone]  Fax: [phone]    Physical Therapy  Cancellation/No-show Note  Patient Name:  Jaqueline Perez  :  1965   Date:  2020    For today's appointment patient:  [x]  Cancelled  []  Rescheduled appointment  []  No-show     Reason given by patient:  [x]  Patient ill  []  Conflicting appointment  []  No transportation    []  Conflict with work  []  No reason given  []  Other:     Comments:  Pt cancelled , reporting \" stomach issues , but no fever. \"  Electronically signed by:  Arteha Kamara [facility] [id number]

## 2020-07-14 ENCOUNTER — HOSPITAL ENCOUNTER (OUTPATIENT)
Dept: PHYSICAL THERAPY | Age: 55
Setting detail: THERAPIES SERIES
Discharge: HOME | End: 2020-07-14
Payer: COMMERCIAL

## 2020-07-14 PROCEDURE — 97110 THERAPEUTIC EXERCISES: CPT

## 2020-07-16 ENCOUNTER — HOSPITAL ENCOUNTER (OUTPATIENT)
Dept: PHYSICAL THERAPY | Age: 55
Setting detail: THERAPIES SERIES
Discharge: HOME | End: 2020-07-16
Payer: COMMERCIAL

## 2020-07-16 PROCEDURE — 97110 THERAPEUTIC EXERCISES: CPT

## 2020-07-16 NOTE — PROGRESS NOTES
[address] [address] [address] [place]      Phone: [phone]  Fax: [phone]    Physical Therapy Daily Treatment Note  Date:  2020    Patient Name:  Ardath Halsted    :  1965  MRN: [id number]    Restrictions/Precautions:    Diagnosis:  Rupture right biceps tendon  Treatment Diagnosis:    Insurance/Certification information:  [id number]  Referring Physician:  Emeterio Martins MD  Plan of care signed (Y/N):    Visit# / total visits:   (18 visits approved through 20)  Pain level: 3-4/10   Time In:  0805  Time Out:  855    Subjective:  Pt reported the soreness in his R upper traps and cervical region is gone. His biggest difficulty is doing his hair, when his R UE is ER . Exercises:  Exercise/Equipment Resistance/Repetitions Other comments     Pulleys  Flex x 5 minutes      UBE 5 min fwd / 5 min bwd      Wall ladder Flex and abd x 10 reps each      IR stretch with towel 10 sec x 5 reps      Supine flex w/ wand X 15 reps       Side lying ABD , ER X 15 reps each      Bicep curls  1 # 2 x 15 reps      4 way sh. isometric  10 sec x 3 - 5  reps each            Shoulder stabilization w/ ball CW,CCW, up/down, side<>side x 10 reps each                                                                      Other Therapeutic Activities:  NA    Home Exercise Program:  N/A    Manual Treatments:  N/A    Modalities:  N/A    Timed Code Treatment Minutes:  50    Total Treatment Minutes:  50    Treatment/Activity Tolerance:  [] Patient tolerated treatment well [] Patient limited by fatigue  [x] Patient limited by pain  [] Patient limited by other medical complications  [x] Other:  Mobility remains slow and guarded throughout session w/ R UE .  Muscles fatigues easily       Prognosis: [x] Good [] Fair  [] Poor    Patient Requires Follow-up: [x] Yes  [] No    Plan:   [x] Continue per plan of care [] Alter current plan (see comments)  [] Plan of care initiated [] Hold pending MD visit [] Discharge    Plan for Next Session:        Electronically signed by:  Rocky Bugros, PTA [id number]

## 2020-07-17 ENCOUNTER — HOSPITAL ENCOUNTER (OUTPATIENT)
Dept: PHYSICAL THERAPY | Age: 55
Setting detail: THERAPIES SERIES
Discharge: HOME | End: 2020-07-17
Payer: COMMERCIAL

## 2020-07-17 PROCEDURE — 97110 THERAPEUTIC EXERCISES: CPT

## 2020-07-17 NOTE — PROGRESS NOTES
[address] [address] [place]      Phone: [phone]  Fax: [phone]    Physical Therapy Daily Treatment Note  Date:  2020    Patient Name:  Carson Elias    :  1965  MRN: [id number]    Restrictions/Precautions:    Diagnosis:  Rupture right biceps tendon  Treatment Diagnosis:    Insurance/Certification information:  [facility]  Referring Physician:  Maris Villatoro MD  Plan of care signed (Y/N):    Visit# / total visits:   (18 visits approved through 20)  Pain level: 310   Time In:  0800  Time Out:  857    Subjective:  Pt had nothing new to report      Exercises:  Exercise/Equipment Resistance/Repetitions Other comments     Pulleys  Flex x 5 minutes      UBE 5 min fwd / 5 min bwd      Wall ladder Flex and abd x 10 reps each      IR stretch with towel 10 sec x 5 reps      Supine flex w/ wand 2 # X 15 reps       Side lying ABD , ER 1 # X 15 reps each      Bicep curls  2  # 2 x 15 reps      4 way sh. isometric  10 sec x 3 - 5  reps each      Front/lateral raises  1 # x 10 reps each     Shoulder stabilization w/ ball CW,CCW, up/down, side<>side x 10 reps each                                                                      Other Therapeutic Activities:  NA    Home Exercise Program:  N/A    Manual Treatments:  N/A    Modalities:  N/A    Timed Code Treatment Minutes:  57    Total Treatment Minutes:  57    Treatment/Activity Tolerance:  [x] Patient tolerated treatment well [] Patient limited by fatigue  [] Patient limited by pain  [] Patient limited by other medical complications  [x] Other:  Mobility remains slow and sometimes guarded throughout session w/ R UE .     Prognosis: [x] Good [] Fair  [] Poor    Patient Requires Follow-up: [x] Yes  [] No    Plan:   [x] Continue per plan of care [] Alter current plan (see comments)  [] Plan of care initiated [] Hold pending MD visit [] Discharge    Plan for Next Session:        Electronically signed by:  Jerry Khan PTA [id number]

## 2020-07-20 ENCOUNTER — HOSPITAL ENCOUNTER (OUTPATIENT)
Dept: PHYSICAL THERAPY | Age: 55
Setting detail: THERAPIES SERIES
Discharge: HOME | End: 2020-07-20
Payer: COMMERCIAL

## 2020-07-20 ENCOUNTER — TELEPHONE (OUTPATIENT)
Dept: CARDIOTHORACIC SURGERY | Age: 55
End: 2020-07-20

## 2020-07-20 PROCEDURE — 97110 THERAPEUTIC EXERCISES: CPT

## 2020-07-20 NOTE — PROGRESS NOTES
[address] [address] [place]      Phone: [phone]  Fax: [phone]    Physical Therapy Daily Treatment Note  Date:  2020    Patient Name:  Rupa Lopez    :  1965  MRN: [id number]    Restrictions/Precautions:    Diagnosis:  Rupture right biceps tendon  Treatment Diagnosis:    Insurance/Certification information:  [facility]  Referring Physician:  Jenn Rosa MD  Plan of care signed (Y/N):    Visit# / total visits:   (18 visits approved through 20)  Pain level: 4/10   Time In:  0800  Time Out:  843    Subjective:  Pt reported he had a CT scan last week and was found to have a mass on his R lung. Pt reported he feels that some of his R shoulder pain ,in addition to the tendon rupture , may be referred from the mass. Pt had a mass in his R lung approx a year ago , and had referred shoulder pain at that time too from it, until it was removed.         Exercises:  Exercise/Equipment Resistance/Repetitions Other comments     Pulleys  Flex x 5 minutes      UBE 5 min fwd / 5 min bwd      Wall ladder Flex and abd x 10 reps each      IR stretch with towel 10 sec x 5 reps      Supine flex w/ wand 2 # X 15 reps       Side lying ABD , ER 1 # X 15 reps each      Bicep curls  2  # 2 x 15 reps      4 way sh. isometric  10 sec x 3 - 5  reps each      Front/lateral raises  2 # x 10 reps each     Shoulder stabilization w/ ball CW,CCW, up/down, side<>side x 10 reps each                                                                      Other Therapeutic Activities:  NA    Home Exercise Program:  N/A    Manual Treatments:  N/A    Modalities:  N/A    Timed Code Treatment Minutes:  43    Total Treatment Minutes:  43    Treatment/Activity Tolerance:  [x] Patient tolerated treatment well [] Patient limited by fatigue  [] Patient limited by pain  [] Patient limited by other medical complications  [] Other:      Prognosis: [x] Good [] Fair  [] Poor    Patient Requires Follow-up: [x] Yes  [] No    Plan:   [x] Continue per plan of care [] Alter current plan (see comments)  [] Plan of care initiated [] Hold pending MD visit [] Discharge    Plan for Next Session:        Electronically signed by:  Joe Chester, PTA [id number]

## 2020-07-20 NOTE — TELEPHONE ENCOUNTER
Pt a previous David pt. Had surgery 8/19 for RUL nodule. He recently had a ct ordered by Dr Marcela Mckeon that showed a new right lower lobe nodule.  Can he come to see  or does he need to see Dr Ant Mora first?

## 2020-07-21 ENCOUNTER — HOSPITAL ENCOUNTER (OUTPATIENT)
Dept: PHYSICAL THERAPY | Age: 55
Setting detail: THERAPIES SERIES
Discharge: HOME | End: 2020-07-21
Payer: COMMERCIAL

## 2020-07-21 PROCEDURE — 97110 THERAPEUTIC EXERCISES: CPT

## 2020-07-21 NOTE — PROGRESS NOTES
[address] [address] [place]      Phone: [phone]  Fax: [phone]    Physical Therapy Daily Treatment Note  Date:  2020    Patient Name:  Carson Elias    :  1965  MRN: [id number]    Restrictions/Precautions:    Diagnosis:  Rupture right biceps tendon  Treatment Diagnosis:    Insurance/Certification information:  [facility]  Referring Physician:  Maris Villatoro MD  Plan of care signed (Y/N):    Visit# / total visits:  10/18 (18 visits approved through 20)  Pain level: 4/10   Time In:  757  Time Out:  842    Subjective:  Pt has an appointment w/ Dr. Chuy Ford 2020    Exercises:  Exercise/Equipment Resistance/Repetitions Other comments     Pulleys  Flex x 5 minutes      UBE 5 min fwd / 5 min bwd      Wall ladder Flex and abd x 10 reps each      IR stretch with towel 10 sec x 5 reps      Supine flex w/ wand 2 # X 15 reps       Side lying ABD , ER ABD 1 # x 10  reps , 2 # x 10 reps   ER  1 # 2 x 10 reps       Bicep curls  2  # 2 x 15 reps      4 way sh. isometric  10 sec x 3 - 5  reps each      Front/lateral raises  2 # x 10 reps each     Shoulder stabilization w/ ball CW,CCW, up/down, side<>side x 10 reps each                                                                      Other Therapeutic Activities:  NA    Home Exercise Program:  N/A    Manual Treatments:  N/A    Modalities:  N/A    Timed Code Treatment Minutes:  45    Total Treatment Minutes:  45      Treatment/Activity Tolerance:  [x] Patient tolerated treatment well [] Patient limited by fatigue  [] Patient limited by pain  [] Patient limited by other medical complications  [] Other:      Prognosis: [x] Good [] Fair  [] Poor    Patient Requires Follow-up: [x] Yes  [] No    Plan:   [x] Continue per plan of care [] Alter current plan (see comments)  [] Plan of care initiated [] Hold pending MD visit [] Discharge    Plan for Next Session:        Electronically signed by:  Jerry Khan PTA [address]

## 2020-07-22 ENCOUNTER — OFFICE VISIT (OUTPATIENT)
Dept: ORTHOPEDIC SURGERY | Age: 55
End: 2020-07-22
Payer: COMMERCIAL

## 2020-07-22 VITALS — TEMPERATURE: 98 F

## 2020-07-22 PROCEDURE — 99213 OFFICE O/P EST LOW 20 MIN: CPT | Performed by: ORTHOPAEDIC SURGERY

## 2020-07-22 NOTE — PROGRESS NOTES
Follow Up Visit     Laura Baldwin returns today for follow up visit regarding right shoulder long head biceps tendon rupture, concern for rotator cuff tear. Treatment thus far has included physical therapy with overall good improvement. He reports symptoms are improving. Physical Exam:     No data recorded    General: Alert and oriented x3, no acute distress  Cardiovascular/pulmonary: No labored breathing, peripheral perfusion intact  Musculoskeletal:    Right shoulder exam range of motion 160/60/L1. Kihei's exam produces mild pain. Mild pain on Jobes exam.  Intact speeds exam.  Belly press exam is positive. Unable to laterally raise arm above 90 degrees. No swelling or deformity on inspection. No tenderness to palpation. Controlled Substances Monitoring:      Imaging:  No new imaging today. Previous imaging was reviewed      Assessment: Right shoulder rotator cuff tear      Plan: Today we discussed his right shoulder. Patient reports that he has been continuing with physical therapy as directed. He states he has made some improvements with range of motion and strengthening. However pain continues remain the same. We discussed continuing physical therapy and cortisone injection today. We also discussed surgical intervention specifically a right shoulder arthroscopy rotator cuff repair versus debridement. Patient is going to continue with physical therapy. He will follow-up in 6 weeks for reevaluation. Discussed smoking cessation, if conservative treatment options fail and surgical intervention is necessary. Patient verbalizes understanding. Jose Miguel Poole  Orthopedic Surgery   07/22/20  2:00 PM        Staff Addendum    I have seen and evaluated the patient and agree with the assessment and plan as documented by Lily Huertas CNP.  I have performed the key components of the history and physical examination and concur with the findings and plan, and have made changes where appropriate/necessary. Agree with above. Main issue appears to be related to the subscapularis. He is improving although not yet 100%. He is about 6 weeks out from his injury. Given his improvement thus far we will continue physical therapy.   We will see him back in 6 weeks and if he does not improve we will consider surgical management      Paula Lazo MD  [address] [place] [address]

## 2020-07-24 ENCOUNTER — HOSPITAL ENCOUNTER (OUTPATIENT)
Dept: PHYSICAL THERAPY | Age: 55
Setting detail: THERAPIES SERIES
Discharge: HOME | End: 2020-07-24
Payer: COMMERCIAL

## 2020-07-24 PROCEDURE — 97110 THERAPEUTIC EXERCISES: CPT

## 2020-07-24 NOTE — PROGRESS NOTES
[address] [address] [address] [place]      Phone: [phone]  Fax: [phone]    Physical Therapy Daily Treatment Note  Date:  2020    Patient Name:  Faina Jamil    :  1965  MRN: [id number]    Restrictions/Precautions:    Diagnosis:  Rupture right biceps tendon  Treatment Diagnosis:    Insurance/Certification information:  [facility]  Referring Physician:  Gretchen Jeter MD  Plan of care signed (Y/N):    Visit# / total visits:   (18 visits approved through 20)  Pain level: 3/10   Time In:  759  Time Out:  835    Subjective:  Pt saw Dr. Yadiel Madrid on 2020, and pt reported the physician would like pt to continue therapy 4-6 weeks. Exercises:  Exercise/Equipment Resistance/Repetitions Other comments     Pulleys  Flex x 5 minutes      UBE 5 min fwd / 5 min bwd      Wall ladder Flex and abd x 10 reps each      IR stretch with towel 10 sec x 5 reps      Supine flex w/ wand 2 # X 15 reps       Side lying ABD , ER ABD 1 # x 10  reps , 2 # x 10 reps   ER  1 # 2 x 10 reps       Bicep curls  2  # 2 x 15 reps      4 way sh. isometric  10 sec x 3 - 5  reps each      Front/lateral raises  2 # x 10 reps each     Shoulder stabilization w/ ball CW,CCW, up/down, side<>side x 10 reps each                                                                      Other Therapeutic Activities:  NA    Home Exercise Program:  N/A    Manual Treatments:  N/A    Modalities:  N/A    Timed Code Treatment Minutes:  35    Total Treatment Minutes:  35      Treatment/Activity Tolerance:  [x] Patient tolerated treatment well [] Patient limited by fatigue  [] Patient limited by pain  [] Patient limited by other medical complications  [x] Other:  Pt performed program quickly today , as his Wife needed the car.      Prognosis: [x] Good [] Fair  [] Poor    Patient Requires Follow-up: [x] Yes  [] No    Plan:   [x] Continue per plan of care [] Alter current plan (see comments)  [] Plan of care

## 2020-07-27 ENCOUNTER — HOSPITAL ENCOUNTER (OUTPATIENT)
Dept: PHYSICAL THERAPY | Age: 55
Setting detail: THERAPIES SERIES
Discharge: HOME | End: 2020-07-27
Payer: COMMERCIAL

## 2020-07-27 PROCEDURE — 97110 THERAPEUTIC EXERCISES: CPT

## 2020-07-27 NOTE — PROGRESS NOTES
[address] [address] [address] [place]      Phone: [phone]  Fax: [phone]    Physical Therapy Daily Treatment Note  Date:  2020    Patient Name:  Leyla Mensah    :  1965  MRN: [id number]    Restrictions/Precautions:    Diagnosis:  Rupture right biceps tendon  Treatment Diagnosis:    Insurance/Certification information:  [id number]  Referring Physician:  Eduard Renner MD  Plan of care signed (Y/N):    Visit# / total visits:   (18 visits approved through 20)  Pain level: 210   Time In:  800  Time Out:  840    Subjective:  Pt reported he took it easy over the weekend and his pain has decreased a bit.      Exercises:  Exercise/Equipment Resistance/Repetitions Other comments     Pulleys  Flex x 5 minutes      UBE 5 min fwd / 5 min bwd      Wall ladder Flex and abd x 10 reps each      IR stretch with towel 10 sec x 5 reps      Supine flex w/ wand 2 # X 15 reps       Side lying ABD , ER ABD / ER   2 # 2 x 10 reps         Bicep curls  2  # 2 x 15 reps      4 way sh. isometric  10 sec x 3 - 5  reps each      Front/lateral raises  2 # x 10 reps each     Shoulder stabilization w/ ball CW,CCW, up/down, side<>side x 10 reps each                                                                      Other Therapeutic Activities:  NA    Home Exercise Program:  N/A    Manual Treatments:  N/A    Modalities:  N/A    Timed Code Treatment Minutes:  40    Total Treatment Minutes:  40      Treatment/Activity Tolerance:  [x] Patient tolerated treatment well [] Patient limited by fatigue  [] Patient limited by pain  [] Patient limited by other medical complications  [] Other: Prognosis: [x] Good [] Fair  [] Poor    Patient Requires Follow-up: [x] Yes  [] No    Plan:   [x] Continue per plan of care [] Alter current plan (see comments)  [] Plan of care initiated [] Hold pending MD visit [] Discharge    Plan for Next Session:        Electronically signed by:  Doretha Lazo PTA [address]

## 2020-07-28 ENCOUNTER — HOSPITAL ENCOUNTER (OUTPATIENT)
Dept: PHYSICAL THERAPY | Age: 55
Setting detail: THERAPIES SERIES
Discharge: HOME | End: 2020-07-28
Payer: COMMERCIAL

## 2020-07-28 PROCEDURE — 97110 THERAPEUTIC EXERCISES: CPT

## 2020-07-28 NOTE — PROGRESS NOTES
[address] [address] [place]      Phone: [phone]  Fax: [phone]    Physical Therapy Daily Treatment Note  Date:  2020    Patient Name:  Reyes Hayes    :  1965  MRN: [id number]    Restrictions/Precautions:    Diagnosis:  Rupture right biceps tendon  Treatment Diagnosis:    Insurance/Certification information:  [facility]  Referring Physician:  Matt Finch MD  Plan of care signed (Y/N):    Visit# / total visits:   (18 visits approved through 20)  Pain level: 4/10   Time In:  804  Time Out:  842    Subjective:  Pt reported he can not attribute his increase in pain to a specific activity    Exercises:  Exercise/Equipment Resistance/Repetitions Other comments     Pulleys  Flex x 5 minutes      UBE 5 min fwd / 5 min bwd      Wall ladder Flex and abd x 10 reps each      IR stretch with towel 10 sec x 5 reps      Supine flex w/ wand 2 # X 15 reps       Side lying ABD , ER ABD / ER   2 # 2 x 10 reps         Bicep curls  2  # 2 x 15 reps      4 way sh. isometric  10 sec x 3 - 5  reps each      Front/lateral raises  2 # x 10 reps each     Shoulder stabilization w/ ball CW,CCW, up/down, side<>side x 10 reps each                                                                      Other Therapeutic Activities:  NA    Home Exercise Program:  N/A    Manual Treatments:  N/A    Modalities:  N/A    Timed Code Treatment Minutes:  38    Total Treatment Minutes:  38      Treatment/Activity Tolerance:  [x] Patient tolerated treatment well [] Patient limited by fatigue  [] Patient limited by pain  [] Patient limited by other medical complications  [] Other:     Prognosis: [x] Good [] Fair  [] Poor    Patient Requires Follow-up: [x] Yes  [] No    Plan:   [x] Continue per plan of care [] Alter current plan (see comments)  [] Plan of care initiated [] Hold pending MD visit [] Discharge    Plan for Next Session:        Electronically signed by:  Winter Harrington PTA [address]

## 2020-07-31 ENCOUNTER — HOSPITAL ENCOUNTER (OUTPATIENT)
Dept: PHYSICAL THERAPY | Age: 55
Setting detail: THERAPIES SERIES
Discharge: HOME | End: 2020-07-31
Payer: COMMERCIAL

## 2020-07-31 PROCEDURE — 97110 THERAPEUTIC EXERCISES: CPT

## 2020-07-31 NOTE — PROGRESS NOTES
[address] [address] [place]      Phone: [phone]  Fax: [phone]    Physical Therapy Daily Treatment Note  Date:  2020    Patient Name:  Cayden Carr    :  1965  MRN: [id number]    Restrictions/Precautions:    Diagnosis:  Rupture right biceps tendon  Treatment Diagnosis:    Insurance/Certification information:  [facility]  Referring Physician:  Christopher Kwon MD  Plan of care signed (Y/N):    Visit# / total visits:   (18 visits approved through 20)  Pain level: 10   Time In:  755  Time Out: 833    Subjective:  Pt had nothing new to report    Exercises:  Exercise/Equipment Resistance/Repetitions Other comments     Pulleys  Flex x 5 minutes      UBE 5 min fwd / 5 min bwd      Wall ladder Flex and abd x 10 reps each      IR stretch with towel 10 sec x 5 reps      Supine flex w/ wand 2 # X 15 reps       Side lying ABD , ER ABD / ER   2 # 2 x 10 reps         Bicep curls  2  # 2 x 15 reps      4 way sh. isometric  10 sec x 3 - 5  reps each      Front/lateral raises  2 # x 10 reps each     Shoulder stabilization w/ ball CW,CCW, up/down, side<>side x 10 reps each              Prone shoulder 3 way  TBA                                                       Other Therapeutic Activities:  NA    Home Exercise Program:  N/A    Manual Treatments:  N/A    Modalities:  N/A    Timed Code Treatment Minutes:  38  Total Treatment Minutes:  38      Treatment/Activity Tolerance:  [x] Patient tolerated treatment well [] Patient limited by fatigue  [] Patient limited by pain  [] Patient limited by other medical complications  [] Other:     Prognosis: [x] Good [] Fair  [] Poor    Patient Requires Follow-up: [x] Yes  [] No    Plan:   [x] Continue per plan of care [] Alter current plan (see comments)  [] Plan of care initiated [] Hold pending MD visit [] Discharge    Plan for Next Session:        Electronically signed by:  Moriah Funk, PTA [id number]

## 2020-08-03 ENCOUNTER — HOSPITAL ENCOUNTER (OUTPATIENT)
Dept: PHYSICAL THERAPY | Age: 55
Setting detail: THERAPIES SERIES
Discharge: HOME | End: 2020-08-03
Payer: COMMERCIAL

## 2020-08-03 PROCEDURE — 97110 THERAPEUTIC EXERCISES: CPT

## 2020-08-03 NOTE — PROGRESS NOTES
[address] [address] [address] [place]      Phone: [phone]  Fax: [phone]    Physical Therapy Daily Treatment Note  Date:  8/3/2020    Patient Name:  Candida Cuba    :  1965  MRN: [id number]    Restrictions/Precautions:    Diagnosis:  Rupture right biceps tendon  Treatment Diagnosis:    Insurance/Certification information:  [facility]  Referring Physician:  Karri Bashir MD  Plan of care signed (Y/N):    Visit# / total visits:  15/18 (18 visits approved through 20)  Pain level: 2/10   Time In:  757  Time Out: 845    Subjective:  Pt reported with R UE ABD to 90° pin 2/10.  When ABD >90° pain 5/10     Exercises:  Exercise/Equipment Resistance/Repetitions Other comments     Pulleys  Flex x 5 minutes      UBE 5 min fwd / 5 min bwd      Wall ladder Flex and abd x 10 reps each      IR stretch with towel 10 sec x 5 reps      Supine flex w/ wand 2 # X 15 reps       Side lying ABD , ER ABD / ER   2 # 2 x 10 reps         Bicep curls  2  # 2 x 15 reps      4 way sh. isometric  10 sec x 3 - 5  reps each      Front/lateral raises  2 # x 10 reps each     Shoulder stabilization w/ ball CW,CCW, up/down, side<>side x 10 reps each              Prone shoulder 3 way  X 10 reps each                                                       Other Therapeutic Activities:  NA    Home Exercise Program:  N/A    Manual Treatments:  N/A    Modalities:  N/A    Timed Code Treatment Minutes:  48  Total Treatment Minutes:  48      Treatment/Activity Tolerance:  [x] Patient tolerated treatment well [] Patient limited by fatigue  [] Patient limited by pain  [] Patient limited by other medical complications  [] Other:     Prognosis: [x] Good [] Fair  [] Poor    Patient Requires Follow-up: [x] Yes  [] No    Plan:   [x] Continue per plan of care [] Alter current plan (see comments)  [] Plan of care initiated [] Hold pending MD visit [] Discharge    Plan for Next Session:        Electronically signed by:  Julian Quinn

## 2020-08-04 ENCOUNTER — HOSPITAL ENCOUNTER (OUTPATIENT)
Dept: PHYSICAL THERAPY | Age: 55
Setting detail: THERAPIES SERIES
Discharge: HOME | End: 2020-08-04
Payer: COMMERCIAL

## 2020-08-04 PROCEDURE — 97110 THERAPEUTIC EXERCISES: CPT

## 2020-08-04 NOTE — PROGRESS NOTES
[address] [place] [address] [place]      Phone: [phone]  Fax: [phone]    Physical Therapy Daily Treatment Note  Date:  2020    Patient Name:  Chin White    :  1965  MRN: [id number]    Restrictions/Precautions:    Diagnosis:  Rupture right biceps tendon  Treatment Diagnosis:    Insurance/Certification information:  [facility]  Referring Physician:  Justine Han MD  Plan of care signed (Y/N):    Visit# / total visits:   (18 visits approved through 20)  Pain level: 2/10   Time In:  757  Time Out: 836    Subjective:  Pt continues to report with R UE ABD to 90° pain 2/10.  When ABD >90° pain /10     Exercises:  Exercise/Equipment Resistance/Repetitions Other comments     Pulleys  Flex x 5 minutes      UBE 5 min fwd / 5 min bwd         IR stretch with towel 10 sec x 5 reps      Supine flex w/ wand 2 # X 15 reps       Side lying ABD , ER ABD / ER   2 # 2 x 10 reps         Bicep curls  2  # 2 x 15 reps      4 way sh. isometric  10 sec x 3 - 5  reps each      Front/lateral raises  2 # x 10 reps each     Shoulder stabilization w/ ball CW,CCW, up/down, side<>side x 10 reps each              Prone shoulder 3 way  1 # X 10 reps each      Prone rows 1# x 10 reps                                                 Other Therapeutic Activities:  NA    Home Exercise Program:  Wall slides, 4 way sh isometrics, IR stretch w/ towel    Manual Treatments:  N/A    Modalities:  N/A    Timed Code Treatment Minutes:  39  Total Treatment Minutes: 39       Treatment/Activity Tolerance:  [x] Patient tolerated treatment well [] Patient limited by fatigue  [] Patient limited by pain  [] Patient limited by other medical complications  [x] Other: pt continues to report difficulty w/ activities when R UE is ABD , and overhead         Prognosis: [x] Good [] Fair  [] Poor    Patient Requires Follow-up: [x] Yes  [] No    Plan:   [x] Continue per plan of care [] Alter current plan (see comments)  [] Plan of care initiated [] Hold pending MD visit [] Discharge    Plan for Next Session:        Electronically signed by:  Mozelle Merlin, PTA [id number]

## 2020-08-07 ENCOUNTER — HOSPITAL ENCOUNTER (OUTPATIENT)
Dept: PHYSICAL THERAPY | Age: 55
Setting detail: THERAPIES SERIES
Discharge: HOME | End: 2020-08-07
Payer: COMMERCIAL

## 2020-08-07 PROCEDURE — 97110 THERAPEUTIC EXERCISES: CPT

## 2020-08-10 ENCOUNTER — HOSPITAL ENCOUNTER (OUTPATIENT)
Dept: PHYSICAL THERAPY | Age: 55
Setting detail: THERAPIES SERIES
Discharge: HOME | End: 2020-08-10
Payer: COMMERCIAL

## 2020-08-10 PROCEDURE — 97110 THERAPEUTIC EXERCISES: CPT

## 2020-08-10 NOTE — PROGRESS NOTES
[address] [place] [address] [place]      Phone: [phone]  Fax: [phone]    Physical Therapy Daily Treatment Note  Date:  8/10/2020    Patient Name:  Leroy Zuniga    :  1965  MRN: [id number]    Restrictions/Precautions:    Diagnosis:  Rupture right biceps tendon  Treatment Diagnosis:    Insurance/Certification information:  [facility]  Referring Physician:  Marry Martinez MD  Plan of care signed (Y/N):    Visit# / total visits:   (17 visits completed) ( 18 visits approved through 9/15/20)  Pain level: 3/10   Time In:  758  Time Out: 848    Subjective:  Pt has nothing new to report    Exercises:  Exercise/Equipment Resistance/Repetitions Other comments     Pulleys  Flex x 5 minutes      UBE 5 min fwd / 5 min bwd         IR stretch with towel 10 sec x 5 reps      Supine flex w/ wand 3 # X 15 reps       Side lying ABD , ER ABD 3 # 2 x 10 / ER   2 # 2 x 10 reps         Bicep curls  3  # 2 x 15 reps      4 way sh. isometric  10 sec x 3 - 5  reps each      Front/lateral raises  2 # x 10 -15 reps each     Shoulder stabilization w/ ball CW,CCW, up/down, side<>side x 10-15 reps each              Prone shoulder 3 way  2 # X 15 reps each      Prone rows 2 # x 15 reps                                                 Other Therapeutic Activities:  NA    Home Exercise Program:  Wall slides, 4 way sh isometrics, IR stretch w/ towel    Manual Treatments:  N/A    Modalities:  N/A    Timed Code Treatment Minutes:  50  Total Treatment Minutes: 50      Treatment/Activity Tolerance:  [x] Patient tolerated treatment well [] Patient limited by fatigue  [] Patient limited by pain  [] Patient limited by other medical complications  [] Other:          Prognosis: [x] Good [] Fair  [] Poor    Patient Requires Follow-up: [x] Yes  [] No    Plan:   [x] Continue per plan of care [] Alter current plan (see comments)  [] Plan of care initiated [] Hold pending MD visit [] Discharge    Plan for Next Session: Electronically signed by:  Rajni Larry, PTA [id number]

## 2020-08-13 ENCOUNTER — HOSPITAL ENCOUNTER (OUTPATIENT)
Dept: PHYSICAL THERAPY | Age: 55
Setting detail: THERAPIES SERIES
Discharge: HOME | End: 2020-08-13
Payer: COMMERCIAL

## 2020-08-13 PROCEDURE — 97110 THERAPEUTIC EXERCISES: CPT

## 2020-08-13 NOTE — PROGRESS NOTES
visit [] Discharge    Plan for Next Session:        Electronically signed by:  Maria Elena Hagan, PTA [id number]

## 2020-08-14 ENCOUNTER — HOSPITAL ENCOUNTER (OUTPATIENT)
Dept: PHYSICAL THERAPY | Age: 55
Setting detail: THERAPIES SERIES
Discharge: HOME | End: 2020-08-14
Payer: COMMERCIAL

## 2020-08-14 PROCEDURE — 97110 THERAPEUTIC EXERCISES: CPT

## 2020-08-14 NOTE — PROGRESS NOTES
[address] [address] [address]      Phone: [phone]  Fax: [phone]    Physical Therapy Daily Treatment Note  Date:  2020    Patient Name:  Kendrick Mixon    :  1965  MRN: [id number]    Restrictions/Precautions:    Diagnosis:  Rupture right biceps tendon  Treatment Diagnosis:    Insurance/Certification information:  [facility]  Referring Physician:  Jackie Barrett MD  Plan of care signed (Y/N):    Visit# / total visits:  3/18 (17 visits completed) ( 18 visits approved through 9/15/20)  Pain level: 2-3/10   Time In:  757  Time Out: 840    Subjective:  Pt had nothing new to report.  Pt was not sore after last session     Exercises:  Exercise/Equipment Resistance/Repetitions Other comments     Pulleys  Flex x 5 minutes      UBE 5 min fwd / 5 min bwd         IR stretch with towel 10 sec x 5 reps      Supine flex w/ wand 3 # X 15 reps       Side lying ABD , ER ABD 3 # 2 x 10 / ER   2 # 2 x 10 reps         Bicep curls  3  # 2 x 15 reps      4 way sh. isometric  10 sec x 3 - 5  reps each      Front/lateral raises  2 # x 10 -15 reps each     Shoulder stabilization w/ ball CW,CCW, up/down, side<>side x 10-15 reps each              Prone shoulder 3 way  2 # X 15 reps each      Prone rows 2 # x 15 reps                                                 Other Therapeutic Activities:  NA    Home Exercise Program:  Wall slides, 4 way sh isometrics, IR stretch w/ towel    Manual Treatments:  N/A    Modalities:  N/A    Timed Code Treatment Minutes: 43  Total Treatment Minutes: 43      Treatment/Activity Tolerance:  [x] Patient tolerated treatment well [] Patient limited by fatigue  [] Patient limited by pain  [] Patient limited by other medical complications  [] Other:          Prognosis: [x] Good [] Fair  [] Poor    Patient Requires Follow-up: [x] Yes  [] No    Plan:   [x] Continue per plan of care [] Alter current plan (see comments)  [] Plan of care initiated [] Hold pending MD visit [] Discharge    Plan for Next Session:        Electronically signed by:  Winter Harrington PTA [id number]

## 2020-08-17 ENCOUNTER — HOSPITAL ENCOUNTER (OUTPATIENT)
Dept: PHYSICAL THERAPY | Age: 55
Setting detail: THERAPIES SERIES
Discharge: HOME | End: 2020-08-17
Payer: COMMERCIAL

## 2020-08-17 PROCEDURE — 97110 THERAPEUTIC EXERCISES: CPT

## 2020-08-20 ENCOUNTER — HOSPITAL ENCOUNTER (OUTPATIENT)
Dept: PHYSICAL THERAPY | Age: 55
Setting detail: THERAPIES SERIES
Discharge: HOME | End: 2020-08-20
Payer: COMMERCIAL

## 2020-08-20 PROCEDURE — 97110 THERAPEUTIC EXERCISES: CPT

## 2020-08-20 NOTE — PROGRESS NOTES
[address] [facility]                  Phone: [phone]  Fax: [phone]    Physical Therapy Daily Treatment Note  Date:  2020    Patient Name:  Carlos Eduardo Huizar    :  1965  MRN: [id number]    Restrictions/Precautions:    Diagnosis:  Rupture right biceps tendon  Treatment Diagnosis:    Insurance/Certification information:  [id number]  Referring Physician:  Chucky Aldana MD  Plan of care signed (Y/N):    Visit# / total visits:   (17 visits completed) ( 18 visits approved through 9/15/20)  Pain level: 3/10   Time In:  800  Time Out: 840    Subjective:  Pt had nothing new to report.      Exercises:  Exercise/Equipment Resistance/Repetitions Other comments     Pulleys  Flex x 5 minutes      UBE 5 min fwd / 5 min bwd Level 2.5        IR stretch with towel 10 sec x 5 reps      Supine flex w/ wand 3 # X 15 reps       Side lying ABD , ER ABD 3 # 2 x 10 / ER   2 # 2 x 10 reps         Bicep curls  3  # 2 x 15 reps      4 way sh. isometric  10 sec x 3 - 5  reps each      Front/lateral raises  2 # x 10 -15 reps each     Shoulder stabilization w/ ball CW,CCW, up/down, side<>side x 10-15 reps each              Prone shoulder 3 way  2 # X 15 reps each      Prone rows 2 # x 15 reps                                                 Other Therapeutic Activities:  NA    Home Exercise Program:  Wall slides, 4 way sh isometrics, IR stretch w/ towel    Manual Treatments:  N/A    Modalities:  N/A    Timed Code Treatment Minutes: 40  Total Treatment Minutes: 40      Treatment/Activity Tolerance:  [x] Patient tolerated treatment well [] Patient limited by fatigue  [] Patient limited by pain  [] Patient limited by other medical complications  [] Other:          Prognosis: [x] Good [] Fair  [] Poor    Patient Requires Follow-up: [x] Yes  [] No    Plan:   [x] Continue per plan of care [] Alter current plan (see comments)  [] Plan of care initiated [] Hold pending MD visit [] Discharge    Plan for Next Session: Electronically signed by:  Doretha Lazo, PTA [id number]

## 2020-08-21 ENCOUNTER — HOSPITAL ENCOUNTER (OUTPATIENT)
Dept: PHYSICAL THERAPY | Age: 55
Setting detail: THERAPIES SERIES
Discharge: HOME | End: 2020-08-21
Payer: COMMERCIAL

## 2020-08-21 PROCEDURE — 97110 THERAPEUTIC EXERCISES: CPT | Performed by: PHYSICAL THERAPIST

## 2020-08-24 ENCOUNTER — HOSPITAL ENCOUNTER (OUTPATIENT)
Dept: PHYSICAL THERAPY | Age: 55
Setting detail: THERAPIES SERIES
Discharge: HOME | End: 2020-08-24
Payer: COMMERCIAL

## 2020-08-24 PROCEDURE — 97110 THERAPEUTIC EXERCISES: CPT

## 2020-08-24 NOTE — PROGRESS NOTES
[address] [facility]                  Phone: [phone]  Fax: [phone]    Physical Therapy Daily Treatment Note  Date:  2020    Patient Name:  Johanne Vuong    :  1965  MRN: [id number]    Restrictions/Precautions:    Diagnosis:  Rupture right biceps tendon  Treatment Diagnosis:    Insurance/Certification information:  [facility]  Referring Physician:  Diego Waddell MD  Plan of care signed (Y/N):    Visit# / total visits:   (17 visits completed) ( 18 visits approved through 9/15/20)  Pain level: 310   Time In:  7343  Time Out:  0840    Subjective:  Pt had nothing new to report.      Exercises:  Exercise/Equipment Resistance/Repetitions Other comments     Pulleys  Flex x 5 minutes      UBE 5 min fwd / 5 min bwd Level 2.5        IR stretch with towel 10 sec x 5 reps      Supine flex w/ wand 3 # X 15 reps       Side lying ABD , ER ABD 3 # 2 x 10 / ER   2 # 2 x 10 reps         Bicep curls  3  # 2 x 15 reps      4 way sh. isometric  10 sec x 3 - 5  reps each      Front/lateral raises  2 # x 10 -15 reps each     Shoulder stabilization w/ ball CW,CCW, up/down, side<>side x 10-15 reps each              Prone shoulder 3 way  2 # X 15 reps each      Prone rows 2 # x 15 reps                                                 Other Therapeutic Activities:  NA    Home Exercise Program:  Wall slides, 4 way sh isometrics, IR stretch w/ towel    Manual Treatments:  N/A    Modalities:  N/A    Timed Code Treatment Minutes: 40    Total Treatment Minutes: 45      Treatment/Activity Tolerance:  [x] Patient tolerated treatment well [] Patient limited by fatigue  [] Patient limited by pain  [] Patient limited by other medical complications  [] Other:        Prognosis: [x] Good [] Fair  [] Poor    Patient Requires Follow-up: [x] Yes  [] No    Plan:   [x] Continue per plan of care [] Alter current plan (see comments)  [] Plan of care initiated [] Hold pending MD visit [] Discharge    Plan for Next Session:

## 2020-08-25 ENCOUNTER — HOSPITAL ENCOUNTER (OUTPATIENT)
Dept: PHYSICAL THERAPY | Age: 55
Setting detail: THERAPIES SERIES
Discharge: HOME | End: 2020-08-25
Payer: COMMERCIAL

## 2020-08-25 PROCEDURE — 97110 THERAPEUTIC EXERCISES: CPT

## 2020-08-25 NOTE — PROGRESS NOTES
[address] [facility]                  Phone: [phone]  Fax: [phone]    Physical Therapy Daily Treatment Note  Date:  2020    Patient Name:  Kendrick Mixon    :  1965  MRN: [id number]    Restrictions/Precautions:    Diagnosis:  Rupture right biceps tendon  Treatment Diagnosis:    Insurance/Certification information:  [facility]  Referring Physician:  Jackie Barrett MD  Plan of care signed (Y/N):    Visit# / total visits:   (17 visits completed) ( 18 visits approved through 9/15/20)  Pain level: 3/10   Time In:  7776  Time Out:  0835    Subjective:  Pt had nothing new to report.      Exercises:  Exercise/Equipment Resistance/Repetitions Other comments     Pulleys  Flex x 5 minutes      UBE 5 min fwd / 5 min bwd Level 2.5        IR stretch with towel 10 sec x 5 reps      Supine flex w/ wand 3 # X 15 reps       Side lying ABD , ER ABD 3 # 2 x 10 / ER   2 # 2 x 10 reps         Bicep curls  3  # 2 x 15 reps      4 way sh. isometric  10 sec x 3 - 5  reps each      Front/lateral raises  2 # x 10 -15 reps each     Shoulder stabilization w/ ball CW,CCW, up/down, side<>side x 10-15 reps each              Prone shoulder 3 way  2 # X 15 reps each      Prone rows 2 # x 15 reps                                                 Other Therapeutic Activities:  NA    Home Exercise Program:  Wall slides, 4 way sh isometrics, IR stretch w/ towel    Manual Treatments:  N/A    Modalities:  N/A    Timed Code Treatment Minutes: 40    Total Treatment Minutes: 40      Treatment/Activity Tolerance:  [x] Patient tolerated treatment well [] Patient limited by fatigue  [] Patient limited by pain  [] Patient limited by other medical complications  [] Other:        Prognosis: [x] Good [] Fair  [] Poor    Patient Requires Follow-up: [x] Yes  [] No    Plan:   [x] Continue per plan of care [] Alter current plan (see comments)  [] Plan of care initiated [] Hold pending MD visit [] Discharge    Plan for Next Session: Electronically signed by:  Zeenat Johnson, PTA [id number]

## 2020-08-28 ENCOUNTER — APPOINTMENT (OUTPATIENT)
Dept: PHYSICAL THERAPY | Age: 55
End: 2020-08-28
Payer: COMMERCIAL

## 2020-08-31 ENCOUNTER — HOSPITAL ENCOUNTER (OUTPATIENT)
Dept: PHYSICAL THERAPY | Age: 55
Setting detail: THERAPIES SERIES
Discharge: HOME | End: 2020-08-31
Payer: COMMERCIAL

## 2020-08-31 PROCEDURE — 97110 THERAPEUTIC EXERCISES: CPT

## 2020-08-31 NOTE — PROGRESS NOTES
Plan of care initiated [] Hold pending MD visit [] Discharge    Plan for Next Session:        Electronically signed by:  Rajni Larry, PTA [id number]

## 2020-09-01 ENCOUNTER — HOSPITAL ENCOUNTER (OUTPATIENT)
Dept: PHYSICAL THERAPY | Age: 55
Setting detail: THERAPIES SERIES
Discharge: HOME | End: 2020-09-01
Payer: COMMERCIAL

## 2020-09-01 PROCEDURE — 97110 THERAPEUTIC EXERCISES: CPT

## 2020-09-01 NOTE — PROGRESS NOTES
comments)  [] Plan of care initiated [] Hold pending MD visit [] Discharge    Plan for Next Session:        Electronically signed by:  Moriah Funk, PTA [id number]

## 2020-09-02 ENCOUNTER — OFFICE VISIT (OUTPATIENT)
Dept: ORTHOPEDIC SURGERY | Age: 55
End: 2020-09-02
Payer: COMMERCIAL

## 2020-09-02 VITALS — BODY MASS INDEX: 26.6 KG/M2 | TEMPERATURE: 96.9 F | HEIGHT: 71 IN | WEIGHT: 190 LBS

## 2020-09-02 PROCEDURE — 99213 OFFICE O/P EST LOW 20 MIN: CPT | Performed by: ORTHOPAEDIC SURGERY

## 2020-09-02 RX ORDER — NICOTINE 21 MG/24HR
1 PATCH, TRANSDERMAL 24 HOURS TRANSDERMAL EVERY 24 HOURS
COMMUNITY
End: 2020-11-06

## 2020-09-02 NOTE — PROGRESS NOTES
Follow Up Visit     Marnie Tyson returns today for follow up visit regarding right shoulder long head biceps tendon rupture, concern for rotator cuff tear. Treatment thus far has included physical therapy with overall good improvement. Treatment thus far has included formal therapy with minimal improvement overall. He reports symptoms are unchanged. Physical Exam:     Height: 5' 11\" (1.803 m), Weight: 190 lb (86.2 kg)    General: Alert and oriented x3, no acute distress  Cardiovascular/pulmonary: No labored breathing, peripheral perfusion intact  Musculoskeletal:    Right shoulder exam range of motion 160/60/L5. Pain is present with range of motion. Shoulder is stiff on passive range of motion exam.  Belly press exam shows stiffness with pain. Liftoff exam is positive. Jobes and speeds exam are positive for mild pain Rogers's exam positive for moderate pain. Controlled Substances Monitoring:      Imaging:  No new imaging obtained    Previous imaging was reviewed    MRI findings show abnormal signal around the subscapularis. Assessment: Right shoulder pain      Plan:   We discussed his right shoulder. Patient has done 10 weeks of physical therapy. Patient reports he feels he has plateaued with physical therapy and has not made recent improvements. Patient reports that stiffness and pain persist.  We discussed possible surgical intervention today specifically a right shoulder arthroscopy rotator cuff repair versus debridement     Abiel Ang CNP  Orthopedic Surgery   09/02/20  3:34 PM      Staff Addendum    I have seen and evaluated the patient and agree with the assessment and plan as documented by Abiel Ang CNP. I have performed the key components of the history and physical examination and concur with the findings and plan, and have made changes where appropriate/necessary.         Calixto Jung MD  [facility] [employer]

## 2020-09-04 ENCOUNTER — HOSPITAL ENCOUNTER (OUTPATIENT)
Dept: PHYSICAL THERAPY | Age: 55
Setting detail: THERAPIES SERIES
Discharge: HOME | End: 2020-09-04
Payer: COMMERCIAL

## 2020-09-04 NOTE — PROGRESS NOTES
[address] [facility]                Phone: [phone]  Fax: [phone]    Physical Therapy  Cancellation/No-show Note  Patient Name:  Lisa Alonzo  :  1965   Date:  2020    For today's appointment patient:  [x]  Cancelled  []  Rescheduled appointment  []  No-show     Reason given by patient:  [x]  Patient ill  []  Conflicting appointment  []  No transportation    []  Conflict with work  []  No reason given  []  Other:     Comments:  Headache    Electronically signed by:  Peña Phelps PT [id number]

## 2020-09-08 ENCOUNTER — HOSPITAL ENCOUNTER (OUTPATIENT)
Dept: PHYSICAL THERAPY | Age: 55
Setting detail: THERAPIES SERIES
Discharge: HOME | End: 2020-09-08
Payer: COMMERCIAL

## 2020-09-08 PROCEDURE — 97110 THERAPEUTIC EXERCISES: CPT

## 2020-09-08 NOTE — PROGRESS NOTES
[address] [facility]                  Phone: [phone]  Fax: [phone]    Physical Therapy Daily Treatment Note  Date:  2020    Patient Name:  Agnes Marshall    :  1965  MRN: [id number]    Restrictions/Precautions:    Diagnosis:  Rupture right biceps tendon  Treatment Diagnosis:    Insurance/Certification information:  [facility]  Referring Physician:  Thaddeus Amador MD  Plan of care signed (Y/N):    Visit# / total visits:   (17 visits completed) ( 18 visits approved through 9/15/20)  Pain level: 4/10   Time In:  0800  Time Out:  0840    Subjective:  Pt reported being a bit more sore, from working around the house and yard, but not too bad. Pt had follow up w/ Dr. Korina Barragan 2020. They discussed surgery on his R shoulder and are awaiting approval to proceed.      Exercises:  Exercise/Equipment Resistance/Repetitions Other comments     Pulleys  Flex x 5 minutes      UBE 5 min fwd / 5 min bwd Level 2.5        IR stretch with towel 10 sec x 5 reps      Supine flex w/ wand 4 # X 15 reps       Side lying ABD , ER  3 # 2 x 10reps      Bicep curls  3  # 2 x 15 reps    HEP     Front/lateral raises  3 # x 10 -15 reps each     Shoulder stabilization w/ ball 1 # cuff wt. CW,CCW, up/down, side<>side x 10-15 reps each              Prone shoulder 3 way  3 # X 15 reps each      Prone rows 3 # x 15 reps              IR / ER / ext / rows  GTB x 10 reps each                                  Other Therapeutic Activities:  NA    Home Exercise Program:  Wall slides, 4 way sh isometrics, IR stretch w/ towel    Manual Treatments:  N/A    Modalities:  N/A    Timed Code Treatment Minutes: 40    Total Treatment Minutes: 40      Treatment/Activity Tolerance:  [x] Patient tolerated treatment well [] Patient limited by fatigue  [] Patient limited by pain  [] Patient limited by other medical complications  [] Other:        Prognosis: [x] Good [] Fair  [] Poor    Patient Requires Follow-up: [x] Yes  [] No    Plan:   [x] Continue per plan of care [] Alter current plan (see comments)  [] Plan of care initiated [] Hold pending MD visit [] Discharge    Plan for Next Session:        Electronically signed by:  Rajni Larry, PTA [id number]

## 2020-09-09 ENCOUNTER — HOSPITAL ENCOUNTER (OUTPATIENT)
Dept: PHYSICAL THERAPY | Age: 55
Setting detail: THERAPIES SERIES
Discharge: HOME | End: 2020-09-09
Payer: COMMERCIAL

## 2020-09-09 PROCEDURE — 97110 THERAPEUTIC EXERCISES: CPT

## 2020-09-09 NOTE — PROGRESS NOTES
[address] [facility]                  Phone: [phone]  Fax: [phone]    Physical Therapy Daily Treatment Note  Date:  2020    Patient Name:  Adam Bess    :  1965  MRN: [id number]    Restrictions/Precautions:    Diagnosis:  Rupture right biceps tendon  Treatment Diagnosis:    Insurance/Certification information:  [facility]  Referring Physician:  Kika Thorne MD  Plan of care signed (Y/N):    Visit# / total visits:   (17 visits completed) ( 18 visits approved through 9/15/20)  Pain level: 4/10   Time In:  0800  Time Out:  0840    Subjective:  Pt reported being a bit more sore, from working around the house and yard, but not too bad. Pt had follow up w/ Dr. Deonte Alonzo 2020. They discussed surgery on his R shoulder and are awaiting approval to proceed.      Exercises:  Exercise/Equipment Resistance/Repetitions Other comments     Pulleys  Flex x 5 minutes      UBE 5 min fwd / 5 min bwd Level 2.5        IR stretch with towel 10 sec x 5 reps      Supine flex w/ wand 4 # X 15 reps       Side lying ABD , ER  3 # 2 x 10reps      Bicep curls  3  # 2 x 15 reps    HEP     Front/lateral raises  3 # x 10 -15 reps each     Shoulder stabilization w/ ball 1 # cuff wt. CW,CCW, up/down, side<>side x 10-15 reps each              Prone shoulder 3 way  3 # X 15 reps each      Prone rows 3 # x 15 reps              IR / ER / ext / rows  GTB x 10 reps each                                  Other Therapeutic Activities:  NA    Home Exercise Program:  Wall slides, 4 way sh isometrics, IR stretch w/ towel    Manual Treatments:  N/A    Modalities:  N/A    Timed Code Treatment Minutes: 40    Total Treatment Minutes: 40      Treatment/Activity Tolerance:  [x] Patient tolerated treatment well [] Patient limited by fatigue  [] Patient limited by pain  [] Patient limited by other medical complications  [] Other:        Prognosis: [x] Good [] Fair  [] Poor    Patient Requires Follow-up: [x] Yes  [] No    Plan:   [x] Continue per plan of care [] Alter current plan (see comments)  [] Plan of care initiated [] Hold pending MD visit [] Discharge    Plan for Next Session:        Electronically signed by:  Winter Harrington PTA [id number]                                                                    [facility]  [address]                  Phone: [phone]  Fax: [phone]    Physical Therapy Daily Treatment Note  Date:  2020    Patient Name:  Reyes Hayes    :  1965  MRN: [id number]    Restrictions/Precautions:    Diagnosis:  Rupture right biceps tendon  Treatment Diagnosis:    Insurance/Certification information:  [facility]  Referring Physician:  Matt Finch MD  Plan of care signed (Y/N):    Visit# / total visits:   (17 visits completed) ( 18 visits approved through 9/15/20)  Pain level: 4/10   Time In:  0759  Time Out:  0840    Subjective:  Pt reported being sore from progressions but overall feels the same    Exercises:  Exercise/Equipment Resistance/Repetitions Other comments     Pulleys  Flex x 5 minutes      UBE 5 min fwd / 5 min bwd Level 2.5        IR stretch with towel 10 sec x 5 reps      Supine flex w/ wand 4 # X 15 reps       Side lying ABD , ER  3 # 2 x 10reps      Bicep curls  3  # 2 x 15 reps         Front/lateral raises  3 # x 10 -15 reps each     Shoulder stabilization w/ ball 1 # cuff wt. CW,CCW, up/down, side<>side x 10-15 reps each              Prone shoulder 3 way  3 # X 15 reps each      Prone rows 3 # x 15 reps              IR / ER / ext / rows  GTB x 10 reps each                                  Other Therapeutic Activities:  NA    Home Exercise Program:  Wall slides, 4 way sh isometrics, IR stretch w/ towel    Manual Treatments:  N/A    Modalities:  N/A    Timed Code Treatment Minutes: 41    Total Treatment Minutes: 41      Treatment/Activity Tolerance:  [x] Patient tolerated treatment well [] Patient limited by fatigue  [] Patient limited by pain  [] Patient limited by

## 2020-09-11 ENCOUNTER — HOSPITAL ENCOUNTER (OUTPATIENT)
Dept: PHYSICAL THERAPY | Age: 55
Setting detail: THERAPIES SERIES
Discharge: HOME | End: 2020-09-11
Payer: COMMERCIAL

## 2020-09-11 PROCEDURE — 97110 THERAPEUTIC EXERCISES: CPT | Performed by: PHYSICAL THERAPIST

## 2020-09-11 NOTE — PROGRESS NOTES
[address] [facility]                  Phone: [phone]  Fax: [phone]    Physical Therapy Daily Treatment Note  Date:  2020    Patient Name:  Boris Ramsey    :  1965  MRN: [id number]    Restrictions/Precautions:    Diagnosis:  Rupture right biceps tendon  Treatment Diagnosis:    Insurance/Certification information:  [facility]  Referring Physician:  Nina Brito MD  Plan of care signed (Y/N):    Visit# / total visits:   (17 visits completed) ( 18 visits approved through 9/15/20)  Pain level: 4/10   Time In:  0800  Time Out:  0835    Subjective:  Pt with no new report.     Exercises:  Exercise/Equipment Resistance/Repetitions Other comments     Pulleys  Flex x 5 minutes      UBE 5 min fwd / 5 min bwd Level 2.5        IR stretch with towel 10 sec x 5 reps      Supine flex w/ wand 4 # X 15 reps       Side lying ABD , ER  3 # 2 x 10reps      Bicep curls  3  # 2 x 15 reps    HEP     Front/lateral raises  3 # x 10 -15 reps each     Shoulder stabilization w/ ball 1 # cuff wt. CW,CCW, up/down, side<>side x 10-15 reps each              Prone shoulder 3 way  3 # X 15 reps each      Prone rows 3 # x 15 reps              IR / ER / ext / rows  GTB x 10 reps each                                  Other Therapeutic Activities:  NA    Home Exercise Program:  Wall slides, 4 way sh isometrics, IR stretch w/ towel    Manual Treatments:  N/A    Modalities:  N/A    Timed Code Treatment Minutes: 35    Total Treatment Minutes: 35      Treatment/Activity Tolerance:  [x] Patient tolerated treatment well [] Patient limited by fatigue  [] Patient limited by pain  [] Patient limited by other medical complications  [] Other:        Prognosis: [x] Good [] Fair  [] Poor    Patient Requires Follow-up: [x] Yes  [] No    Plan:   [x] Continue per plan of care [] Alter current plan (see comments)  [] Plan of care initiated [] Hold pending MD visit [] Discharge    Plan for Next Session:        Electronically signed by: Christine Chandler, PT [id number]                                                                    [place] [address] [address]                  Phone: [phone]  Fax: [phone]    Physical Therapy Daily Treatment Note  Date:  2020    Patient Name:  Ben Mobley    :  1965  MRN: [id number]    Restrictions/Precautions:    Diagnosis:  Rupture right biceps tendon  Treatment Diagnosis:    Insurance/Certification information:  [facility]  Referring Physician:  Alee Altamirano MD  Plan of care signed (Y/N):    Visit# / total visits:   (17 visits completed) ( 18 visits approved through 9/15/20)  Pain level: 4/10   Time In:  08  Time Out:  9716    Subjective:  Pt reports he was having a lot of pain yesterday. States \"I think I pushed myself too hard and overdid it in therapy last time. \"    Exercises:  Exercise/Equipment Resistance/Repetitions Other comments     Pulleys  Flex x 5 minutes      UBE 5 min fwd / 5 min bwd Level 2.5        IR stretch with towel 10 sec x 5 reps      Supine flex w/ wand 4 # X 15 reps       Side lying ABD , ER  3 # 2 x 10reps      Bicep curls  3  # 2 x 15 reps         Front/lateral raises  3 # x 10 -15 reps each     Shoulder stabilization w/ ball 1 # cuff wt. CW,CCW, up/down, side<>side x 10-15 reps each              Prone shoulder 3 way  3 # X 15 reps each      Prone rows 3 # x 15 reps              IR / ER / ext / rows  GTB x 10 reps each                                  Other Therapeutic Activities:  NA    Home Exercise Program:  Wall slides, 4 way sh isometrics, IR stretch w/ towel    Manual Treatments:  N/A    Modalities:  N/A    Timed Code Treatment Minutes:  33    Total Treatment Minutes: 33      Treatment/Activity Tolerance:  [x] Patient tolerated treatment well [] Patient limited by fatigue  [] Patient limited by pain  [] Patient limited by other medical complications  [] Other:        Prognosis: [x] Good [] Fair  [] Poor    Patient Requires Follow-up: [x] Yes  [] No    Plan:   [x] Continue per plan of care [] Alter current plan (see comments)  [] Plan of care initiated [] Hold pending MD visit [] Discharge    Plan for Next Session:        Electronically signed by:  CARROLL Hoffman [address]

## 2020-09-14 ENCOUNTER — HOSPITAL ENCOUNTER (OUTPATIENT)
Dept: PHYSICAL THERAPY | Age: 55
Setting detail: THERAPIES SERIES
Discharge: HOME | End: 2020-09-14
Payer: COMMERCIAL

## 2020-09-14 PROCEDURE — 97110 THERAPEUTIC EXERCISES: CPT

## 2020-09-14 NOTE — PROGRESS NOTES
[address] [facility]                  Phone: [phone]  Fax: [phone]    Physical Therapy Daily Treatment Note  Date:  2020    Patient Name:  Lady Pressley    :  1965  MRN: [id number]    Restrictions/Precautions:    Diagnosis:  Rupture right biceps tendon  Treatment Diagnosis:    Insurance/Certification information:  [facility]  Referring Physician:  Matthew Degroot MD  Plan of care signed (Y/N):    Visit# / total visits:   (17 visits completed) ( 18 visits approved through 9/15/20)  Pain level: 4/10   Time In:  0800  Time Out:  0840    Subjective:  Pt reported  About the same , contacting Dr. Anthony Segundo office to check the status of surgery being scheduled.      Exercises:  Exercise/Equipment Resistance/Repetitions Other comments     Pulleys  Flex x 5 minutes      UBE 5 min fwd / 5 min bwd Level 2.5        IR stretch with towel 10 sec x 5 reps      Supine flex w/ wand 4 # X 15 reps       Side lying ABD , ER  3 # 2 x 10reps      Bicep curls  3  # 2 x 15 reps    HEP     Front/lateral raises  3 # x 10 -15 reps each     Shoulder stabilization w/ ball 1 # cuff wt. CW,CCW, up/down, side<>side x 10-15 reps each              Prone shoulder 3 way  3 # X 15 reps each      Prone rows 3 # x 15 reps              IR / ER / ext / rows  GTB x 10 reps each                                  Other Therapeutic Activities:  NA    Home Exercise Program:  Wall slides, 4 way sh isometrics, IR stretch w/ towel    Manual Treatments:  N/A    Modalities:  N/A    Timed Code Treatment Minutes: 40    Total Treatment Minutes: 40      Treatment/Activity Tolerance:  [x] Patient tolerated treatment well [] Patient limited by fatigue  [] Patient limited by pain  [] Patient limited by other medical complications  [] Other:        Prognosis: [x] Good [] Fair  [] Poor    Patient Requires Follow-up: [x] Yes  [] No    Plan:   [x] Continue per plan of care [] Alter current plan (see comments)  [] Plan of care initiated [] Hold pending MD visit [] Discharge    Plan for Next Session:        Electronically signed by:  Jerry Erin, PTA [id number]                                                                    [facility]                  Phone: [phone]  Fax: [phone]    Physical Therapy Daily Treatment Note  Date:  2020    Patient Name:  Carson Elias    :  1965  MRN: [id number]    Restrictions/Precautions:    Diagnosis:  Rupture right biceps tendon  Treatment Diagnosis:    Insurance/Certification information:  [id number]  Referring Physician:  Maris Villatoro MD  Plan of care signed (Y/N):    Visit# / total visits:   (17 visits completed) ( 18 visits approved through 9/15/20)  Pain level: 10   Time In:  0805  Time Out:  0100      Subjective:  Pt reports he was feeling better , not as sore.  Pt contacting Dr. Lizzie Rizvi office today to check on the status of his surgery      Exercises:  Exercise/Equipment Resistance/Repetitions Other comments     Pulleys  Flex x 5 minutes      UBE 5 min fwd / 5 min bwd Level 2.5        IR stretch with towel 10 sec x 5 reps      Supine flex w/ wand 4 # X 15 reps       Side lying ABD , ER  3 # 2 x 10reps      Bicep curls  3  # 2 x 15 reps         Front/lateral raises  3 # x 10 -15 reps each     Shoulder stabilization w/ ball 1 # cuff wt. CW,CCW, up/down, side<>side x 10-15 reps each              Prone shoulder 3 way  3 # X 15 reps each      Prone rows 3 # x 15 reps              IR / ER / ext / rows  GTB x 10 reps each                                  Other Therapeutic Activities:  NA    Home Exercise Program:  Wall slides, 4 way sh isometrics, IR stretch w/ towel    Manual Treatments:  N/A    Modalities:  N/A    Timed Code Treatment Minutes:  44    Total Treatment Minutes: 44      Treatment/Activity Tolerance:  [x] Patient tolerated treatment well [] Patient limited by fatigue  [] Patient limited by pain  [] Patient limited by other medical complications  [] Other:

## 2020-09-15 ENCOUNTER — HOSPITAL ENCOUNTER (OUTPATIENT)
Dept: PHYSICAL THERAPY | Age: 55
Setting detail: THERAPIES SERIES
Discharge: HOME | End: 2020-09-15
Payer: COMMERCIAL

## 2020-09-15 PROCEDURE — 97110 THERAPEUTIC EXERCISES: CPT

## 2020-09-15 NOTE — PROGRESS NOTES
[address] [facility]                  Phone: [phone]  Fax: [phone]    Physical Therapy Daily Treatment Note  Date:  9/15/2020    Patient Name:  Laura Baldwin    :  1965  MRN: [id number]    Restrictions/Precautions:    Diagnosis:  Rupture right biceps tendon  Treatment Diagnosis:    Insurance/Certification information:  [facility]  Referring Physician:  Aidan Rodriguez MD  Plan of care signed (Y/N):    Visit# / total visits:  15/18 (17 visits completed) ( 18 visits approved through 9/15/20)  Pain level: 4/10   Time In:  804  Time Out:  845    Subjective:  Pt reported feeling the same       Exercises:  Exercise/Equipment Resistance/Repetitions Other comments     Pulleys  Flex x 5 minutes      UBE 5 min fwd / 5 min bwd Level 2.5        IR stretch with towel 10 sec x 5 reps      Supine flex w/ wand 4 # X 15 reps       Side lying ABD , ER  3 # 2 x 10reps      Bicep curls  3  # 2 x 15 reps    HEP     Front/lateral raises  3 # x 10 -15 reps each     Shoulder stabilization w/ ball 1 # cuff wt. CW,CCW, up/down, side<>side x 10-15 reps each              Prone shoulder 3 way  3 # X 15 reps each      Prone rows 3 # x 15 reps              IR / ER / ext / rows  GTB x 10 reps each           R shoulder rom Flex 180°      °     IR      45 °     ER    90°      MMT Flex   5/5       ABD   4/5       IR/ER  4/5      Other Therapeutic Activities:  NA    Home Exercise Program:  Wall slides, 4 way sh isometrics, IR stretch w/ towel. 9/15/2020 : pt was provides w/ inclusive HEP of above, and theraband . Pt demonstrated to therapist understanding of HEP.       Manual Treatments:  N/A    Modalities:  N/A    Timed Code Treatment Minutes: 41    Total Treatment Minutes: 41      Treatment/Activity Tolerance:  [x] Patient tolerated treatment well [] Patient limited by fatigue  [] Patient limited by pain  [] Patient limited by other medical complications  [] Other:        Prognosis: [x] Good [] Fair  [] Poor    Patient Requires Follow-up: [x] Yes  [] No    Plan:   [] Continue per plan of care [] Alter current plan (see comments)  [] Plan of care initiated [] Hold pending MD visit [x] Discharge    Plan for Next Session:  Pt awaiting surgery    Electronically signed by:  Winter Harrington PTA [id number]                                                                    [facility]                  Phone: [phone]  Fax: [phone]    Physical Therapy Daily Treatment Note  Date:  9/15/2020    Patient Name:  Reyes Hayes    :  1965  MRN: [id number]    Restrictions/Precautions:    Diagnosis:  Rupture right biceps tendon  Treatment Diagnosis:    Insurance/Certification information:  [facility]  Referring Physician:  Matt Finch MD  Plan of care signed (Y/N):    Visit# / total visits:  15/18 (17 visits completed) ( 18 visits approved through 9/15/20)  Pain level: 4/10   Time In:  0804  Time Out:  0845      Subjective:  Pt reports he was feeling better , not as sore. Pt contacting Dr. Jovanna Mccollum office today to check on the status of his surgery      Exercises:  Exercise/Equipment Resistance/Repetitions Other comments     Pulleys  Flex x 5 minutes      UBE 5 min fwd / 5 min bwd Level 2.5        IR stretch with towel 10 sec x 5 reps      Supine flex w/ wand 4 # X 15 reps       Side lying ABD , ER  3 # 2 x 10reps      Bicep curls  3  # 2 x 15 reps         Front/lateral raises  3 # x 10 -15 reps each     Shoulder stabilization w/ ball 1 # cuff wt. CW,CCW, up/down, side<>side x 10-15 reps each              Prone shoulder 3 way  3 # X 15 reps each      Prone rows 3 # x 15 reps              IR / ER / ext / rows  GTB x 10 reps each           R shoulder rom Flex 180°     °     IR      45°     ER     90°      MMT Flex 5/5       ABD 4/5       IR/ER  4/5      Other Therapeutic Activities:  NA    Home Exercise Program:  Wall slides, 4 way sh isometrics, IR stretch w/ towel.    9/15/2020: pt was provided with a HEP inclusive of all but pulleys, and UBE ex. Pt demonstrated to therapist understanding of HEP.  Pt provided w/ theraband also    Manual Treatments:  N/A    Modalities:  N/A    Timed Code Treatment Minutes:  41    Total Treatment Minutes: 41      Treatment/Activity Tolerance:  [x] Patient tolerated treatment well [] Patient limited by fatigue  [] Patient limited by pain  [] Patient limited by other medical complications  [] Other:        Prognosis: [x] Good [] Fair  [] Poor    Patient Requires Follow-up: [x] Yes  [] No    Plan:   [] Continue per plan of care [] Alter current plan (see comments)  [] Plan of care initiated [] Hold pending MD visit [x] Discharge    Plan for Next Session:  Pt awaiting surgery    Electronically signed by:  Rocky Burgos, PTA [id number]

## 2020-09-18 NOTE — DISCHARGE SUMMARY
[address] [facility]                 Phone: [phone]  Fax: [phone]    Physical Therapy  Out Patient Discharge Summary     Date:  2020    Patient Name:  Lady Pressley    :  1965  MRN: [id number]    DIAGNOSIS:  Rupture right biceps tendon   REFERRING PHYSICIAN:  Matthew Degroot MD    ATTENDANCE:  Pt has attended 28 of 32 scheduled treatments from 20 to 9/15/20. TREATMENTS RECEIVED:  ROM/stretching, strength training, education in a HEP    INITIAL STATUS:  · Pain right shoulder 3-4/10  · ROM right shoulder: flex 140°, abd 135°, IR 45°, ER WNL  · Strength right shoulder 4/5 except IR 4-/5, elbow 4+/5  · Pt with c/o difficulty performing overhead activities at time of evaluation    FINAL STATUS:  · Pain right shoulder 4/5  · ROM right shoulder: flex 180°, abd 164°, IR 45°, ER 90°  · Strength right shoulder 4/5 except flex 5/5, elbow 5/5  · Pt continues to have difficulty with overhead activities due to pain    GOALS:  0 out of 4 Long Term Goals were obtained. LONG TERM GOALS NOT OBTAINED/REASON:  Pain remains unchanged since time of evaluation. Shoulder ROM goal met in all directions except for IR ROM. Strength of the right shoulder improved, however, did not meet goal due to severity of tears in the biceps tendon and rotator cuff. PATIENT GOALS:  To decrease pain and return to prior activities. REASON FOR DISCHARGE:  Pt has received maximum benefit from physical therapy and will be having surgery on his shoulder pending worker's comp approval.    PATIENT EDUCATION/INSTRUCTIONS:  Pt provided with an extensive HEP of shoulder stretches and strength training activities. RECOMMENDATIONS:  Discharge at this time due to pending surgery. Pt will need a new script to return to therapy following shoulder surgery. Thank you for the opportunity to work with your patient.  If you have questions or comments, please feel free to contact me by phone or fax.      Electronically Signed by: Juan Woodall, PT [id number]  9/18/2020

## 2020-11-06 ENCOUNTER — OFFICE VISIT (OUTPATIENT)
Dept: ORTHOPEDIC SURGERY | Age: 55
End: 2020-11-06

## 2020-11-06 ENCOUNTER — HOSPITAL ENCOUNTER (OUTPATIENT)
Age: 55
Discharge: HOME | End: 2020-11-08
Payer: COMMERCIAL

## 2020-11-06 VITALS — BODY MASS INDEX: 26.6 KG/M2 | WEIGHT: 190 LBS | HEIGHT: 71 IN | TEMPERATURE: 97 F

## 2020-11-06 PROCEDURE — PREOPEXAM PRE-OP EXAM: Performed by: ORTHOPAEDIC SURGERY

## 2020-11-06 PROCEDURE — U0003 INFECTIOUS AGENT DETECTION BY NUCLEIC ACID (DNA OR RNA); SEVERE ACUTE RESPIRATORY SYNDROME CORONAVIRUS 2 (SARS-COV-2) (CORONAVIRUS DISEASE [COVID-19]), AMPLIFIED PROBE TECHNIQUE, MAKING USE OF HIGH THROUGHPUT TECHNOLOGIES AS DESCRIBED BY CMS-2020-01-R: HCPCS

## 2020-11-06 RX ORDER — OXYCODONE HYDROCHLORIDE AND ACETAMINOPHEN 5; 325 MG/1; MG/1
1 TABLET ORAL EVERY 6 HOURS PRN
Qty: 28 TABLET | Refills: 0 | Status: SHIPPED | OUTPATIENT
Start: 2020-11-06 | End: 2020-11-13

## 2020-11-06 RX ORDER — KETOROLAC TROMETHAMINE 10 MG/1
10 TABLET, FILM COATED ORAL EVERY 6 HOURS PRN
Qty: 8 TABLET | Refills: 0 | Status: SHIPPED
Start: 2020-11-06 | End: 2021-02-10 | Stop reason: ALTCHOICE

## 2020-11-06 NOTE — PROGRESS NOTES
Department of Orthopedic Surgery  Attending Pre-operative History and Physical        DIAGNOSIS:  Right shoulder pain    INDICATION:  Failed conservative management    PROCEDURE:  RIGHT SHOULDER ARTHROSCOPY ROTATOR CUFF REPAIR VS DEBRIDEMENT     CHIEF COMPLAINT:  Shoulder pain    History Obtained From:  patient    HISTORY OF PRESENT ILLNESS:      The patient is a 50 y.o. male with significant past medical history of right shoulder rotator cuff tear, biceps tear. Patient presents with right shoulder pain. Patient has failed extensive conservative treatment including physical therapy, activity modification, home exercise regimen, cortisone injections. For this reason he wishes to proceed with surgical intervention specifically a right shoulder arthroscopy rotator cuff repair versus debridement. Risks of surgery were discussed in detail today. These risks include but are not limited to infection, neurovascular injury, irreparable rotator cuff repair requiring debridement, failure of repair due to poor tissue quality, need for revision surgery or future surgeries down the road, as well as the risks of general anesthesia. Patient verbalizes understanding of risks and wishes to proceed. Past Medical History:        Diagnosis Date    Back pain     D/T TRAUMA FRACTURE BACK    Edentulous     Hyperlipidemia     Lung nodule        Past Surgical History:        Procedure Laterality Date    CARPAL TUNNEL RELEASE Bilateral 2018 2017. DR Deric Howell    COLONOSCOPY  2016    EYE SURGERY  1980    DETACHED RETINA    THORACOSCOPY Right 8/30/2019    BRONCH, ROBOTIC RIGHT VIDEO ASSISTED THORACOSCOPY, UPPER LOBE WEDGE RESECTION, POSSIBLE LOBECTOMY performed by Manjeet Zarco MD at [address] [place] [address]       Medications Prior to Admission:   Not in a hospital admission. Allergies:  Patient has no known allergies.     History of allergic reaction to anesthesia:  No    Social History:   TOBACCO: reports that he has been smoking cigarettes. He has a 52.50 pack-year smoking history. He has never used smokeless tobacco.  ETOH:   reports no history of alcohol use. DRUGS:   reports no history of drug use. Family History:       Problem Relation Age of Onset    Cancer Mother         LUNG    Cancer Father         LUNG       REVIEW OF SYSTEMS:  CONSTITUTIONAL:  negative  RESPIRATORY:  negative  CARDIOVASCULAR:  negative  MUSCULOSKELETAL:  negative except for  HPI  NEUROLOGICAL:  negative    PHYSICAL EXAM:     VITALS:  Temp 97 °F (36.1 °C) (Infrared)     Gen: AOx3, NAD    Heart:  normal apical pulses, normal S1 and S2 and no edema    Lungs:  No increased work of breathing, good air exchange     Abdomen:  no scars, non-distended and non-tender    Extremities:  No clubbing, cyanosis, or edema    Musculoskeletal:    Right shoulder exam range of motion 170/60/L5. No swelling or deformity visualized on inspection. No tenderness to palpation. Belly press exam shows stiffness with pain. Positive liftoff exam.  Jobes speeds and [place]'s exams were positive for pain. DATA:  CBC:   Lab Results   Component Value Date    WBC 14.2 06/16/2020    RBC 5.14 09/04/2019    HGB 15.1 09/04/2019    HCT 44.2 09/04/2019    MCV 86.0 09/04/2019    MCH 29.4 09/04/2019    MCHC 34.2 09/04/2019    RDW 13.1 09/04/2019     09/04/2019    MPV 9.3 09/04/2019     BMP:    Lab Results   Component Value Date     09/04/2019    K 4.3 09/04/2019    K 4.8 08/28/2019     09/04/2019    CO2 25 09/04/2019    BUN 21 09/04/2019    LABALBU 4.4 08/28/2019    CREATININE 0.8 09/04/2019    CALCIUM 9.3 09/04/2019    GFRAA >60 09/04/2019    LABGLOM >60 09/04/2019    GLUCOSE 97 09/04/2019       Radiology Review: MRI findings of the right shoulder showed abnormal signal involving the subscapularis. ASSESSMENT AND PLAN:    1.   Patient is a 50 y.o. male with above specified procedure planned right shoulder arthroscopy rotator cuff repair versus debridement with anesthesia    2. Procedure options, risks and benefits reviewed with patient. Patient expresses understanding and has signed consent form to proceed. 3.  Patient and family were provided with pre-op and post-op instructions, prescription medications, and any other supplied needed post op (slings, braces, etc.)       Controlled substances monitoring: possible medication side effects, risk of tolerance and/or dependence, and alternative treatments discussed and OARRS report reviewed today- activity consistent with treatment plan. Katarina Ramos, [address] [facility]  Orthopedic Surgery   11/06/20  12:37 PM      Staff Addendum    I have seen and evaluated the patient and agree with the assessment and plan as documented by Katarina Ramos CNP. I have performed the key components of the history and physical examination and concur with the findings and plan, and have made changes where appropriate/necessary.           Jess Sawyer MD  [address] [place] [address]

## 2020-11-08 LAB
SARS-COV-2: NOT DETECTED
SOURCE: NORMAL

## 2020-11-11 ENCOUNTER — HOSPITAL ENCOUNTER (OUTPATIENT)
Dept: PREADMISSION TESTING | Age: 55
Discharge: HOME | End: 2020-11-11
Payer: COMMERCIAL

## 2020-11-11 ENCOUNTER — ANESTHESIA EVENT (OUTPATIENT)
Dept: OPERATING ROOM | Age: 55
End: 2020-11-11
Payer: COMMERCIAL

## 2020-11-11 LAB
EKG ATRIAL RATE: 74 BPM
EKG P AXIS: 33 DEGREES
EKG P-R INTERVAL: 174 MS
EKG Q-T INTERVAL: 408 MS
EKG QRS DURATION: 118 MS
EKG QTC CALCULATION (BAZETT): 452 MS
EKG R AXIS: 67 DEGREES
EKG T AXIS: 44 DEGREES
EKG VENTRICULAR RATE: 74 BPM

## 2020-11-11 RX ORDER — LIDOCAINE HYDROCHLORIDE 10 MG/ML
10 INJECTION, SOLUTION INFILTRATION; PERINEURAL
Status: CANCELLED | OUTPATIENT
Start: 2020-11-11 | End: 2020-11-11

## 2020-11-11 RX ORDER — MIDAZOLAM HYDROCHLORIDE 1 MG/ML
1 INJECTION INTRAMUSCULAR; INTRAVENOUS EVERY 5 MIN PRN
Status: CANCELLED | OUTPATIENT
Start: 2020-11-11

## 2020-11-11 RX ORDER — FENTANYL CITRATE 50 UG/ML
100 INJECTION, SOLUTION INTRAMUSCULAR; INTRAVENOUS ONCE
Status: CANCELLED | OUTPATIENT
Start: 2020-11-11 | End: 2020-11-11

## 2020-11-11 RX ORDER — ROPIVACAINE HYDROCHLORIDE 5 MG/ML
30 INJECTION, SOLUTION EPIDURAL; INFILTRATION; PERINEURAL
Status: CANCELLED | OUTPATIENT
Start: 2020-11-11 | End: 2020-11-11

## 2020-11-11 RX ORDER — DEXAMETHASONE SODIUM PHOSPHATE 10 MG/ML
4 INJECTION, SOLUTION INTRAMUSCULAR; INTRAVENOUS ONCE
Status: CANCELLED | OUTPATIENT
Start: 2020-11-11 | End: 2020-11-11

## 2020-11-11 NOTE — PROGRESS NOTES
Melissa Sellers had EKG done and instructions given for procedure tomorrow. Reviewed EKG and pulmonary history with Dr. Kelsie Ronquillo. He requested that a note be on the chart of Dr. Raghavendra Post of the last CT scan he  ordered . I spoke with his nurse Katie Lorenzo, who is sending him a message.

## 2020-11-12 ENCOUNTER — ANESTHESIA (OUTPATIENT)
Dept: OPERATING ROOM | Age: 55
End: 2020-11-12
Payer: COMMERCIAL

## 2020-11-12 ASSESSMENT — ENCOUNTER SYMPTOMS: SHORTNESS OF BREATH: 1

## 2020-11-12 ASSESSMENT — LIFESTYLE VARIABLES: SMOKING_STATUS: 1

## 2020-11-12 NOTE — ANESTHESIA PRE PROCEDURE
Department of Anesthesiology  Preprocedure Note       Name:  Riana Bond. Age:  50 y.o.  :  1965                                          MRN:  [id number]         Date:  2020      Surgeon: Jerilyn Gonzalez):  Vicki Appiah MD    Procedure: RIGHT SHOULDER ARTHROSCOPY ROTATOR CUFF REPAIR VS DEBRIDEMENT, POSS BICEPS TENOTOMY VS TENODESIS  ++ARTHREX++   ++ISB++ (Right )    Medications prior to admission:   Prior to Admission medications    Medication Sig Start Date End Date Taking? Authorizing Provider   ketorolac (TORADOL) 10 MG tablet Take 1 tablet by mouth every 6 hours as needed for Pain 11/6/20 11/10/20  Vicki Appiah MD   oxyCODONE-acetaminophen (PERCOCET) 5-325 MG per tablet Take 1 tablet by mouth every 6 hours as needed for Pain for up to 7 days. 20  Vicki Appiah MD   HYDROcodone-acetaminophen (NORCO)  MG per tablet Take 1 tablet by mouth every 6 hours as needed for Pain for up to 30 days. Intended supply: 30 days 10/14/20 11/13/20  Dominique Anderson PA-C   ibuprofen (ADVIL;MOTRIN) 800 MG tablet Take 1 tablet by mouth every 6 hours as needed for Pain 6/2/20 11/10/20  Mi Fong MD   Umeclidinium Bromide 62.5 MCG/INH AEPB Inhale 1 puff into the lungs daily 12/3/19   Leola Campos MD   albuterol sulfate  (90 Base) MCG/ACT inhaler Inhale 2 puffs into the lungs 4 times daily as needed for Wheezing 19   Dominique Anderson PA-C       Current medications:    No current facility-administered medications for this visit. No current outpatient medications on file.      Facility-Administered Medications Ordered in Other Visits   Medication Dose Route Frequency Provider Last Rate Last Dose    sodium chloride flush 0.9 % injection 10 mL  10 mL Intravenous 2 times per day Olivia Kras, APRN - CNP        sodium chloride flush 0.9 % injection 10 mL  10 mL Intravenous PRN Olivia Kras, APRN - CNP        ceFAZolin (ANCEF) 2 g in sterile water 20 mL IV syringe  2 g Intravenous On Call to OsmarAGNES - CNP        dexamethasone (PF) (DECADRON) injection 4 mg  4 mg Other Once Papa Chatman MD        fentaNYL (SUBLIMAZE) injection 100 mcg  100 mcg Intravenous Once Papa Chatman MD        lidocaine 1 % injection 10 mL  10 mL Intradermal Once PRN Papa Chatman MD        midazolam (VERSED) injection 1 mg  1 mg Intravenous Q5 Min PRN Papa Chatman MD        ropivacaine (NAROPIN) 0.5% injection 30 mL  30 mL Infiltration Once PRN Papa Chatman MD           Allergies:  No Known Allergies    Problem List:    Patient Active Problem List   Diagnosis Code    Mass of right lung R91.8    Chronic midline low back pain without sciatica M54.5, G89.29       Past Medical History:        Diagnosis Date    Arthritis     BACK- DDD    Back pain 1995    D/T TRAUMA FRACTURE BACK    COPD (chronic obstructive pulmonary disease) ([facility] [address]     DR OLIVER-    Edentulous     Hyperlipidemia     Lung nodule 07/2020    RUL     Right shoulder injury 06/06/2020    Work injury per wife       Past Surgical History:        Procedure Laterality Date    CARPAL TUNNEL RELEASE Bilateral 2018 2017.   DR Katie Esparza    COLONOSCOPY  2016    EYE SURGERY  1980    DETACHED RETINA    THORACOSCOPY Right 8/30/2019    BRONCH, ROBOTIC RIGHT VIDEO ASSISTED THORACOSCOPY, UPPER LOBE WEDGE RESECTION, POSSIBLE LOBECTOMY performed by Camryn Miranda MD at [address] [place] [address]       Social History:    Social History     Tobacco Use    Smoking status: Current Every Day Smoker     Packs/day: 3.00     Years: 35.00     Pack years: 105.00     Types: Cigarettes    Smokeless tobacco: Never Used    Tobacco comment: trying to quit down to pack a day from 4-5 packs   Substance Use Topics    Alcohol use: Never     Frequency: Never                                Ready to quit: Not Answered  Counseling given: Not Answered  Comment: trying to quit down to pack a day from 4-5 8/28/19 8/28/2019  7:58 AM - Kelvin, Mhy Incoming Ekg Results From [facility]     Component Value Ref Range & Units Status Collected Lab   Ventricular Rate 66  BPM Preliminary 08/28/2019  7:52 AM [facility]   Atrial Rate 66  BPM Preliminary 08/28/2019  7:52 AM [facility]   P-R Interval 184  ms Preliminary 08/28/2019  7:52 AM [facility]   QRS Duration 116  ms Preliminary 08/28/2019  7:52 AM [facility]   Q-T Interval 410  ms Preliminary 08/28/2019  7:52 AM [facility]   QTc Calculation (Bazett) 429  ms Preliminary 08/28/2019  7:52 AM [facility]   P [place] 32  degrees Preliminary 08/28/2019  7:52 AM [facility]   R [place] 68  degrees Preliminary 08/28/2019  7:52 AM [facility]   T [place] 37  degrees Preliminary 08/28/2019  7:52 AM [facility]   Testing Performed By     Lab - Abbreviation Name Director Address Valid Date Range   360-[facility] MUSE Unknown Unknown 04/18/16 0721-Present   Narrative   Performed by: [facility]   Normal sinus rhythm  Normal ECG  No previous ECGs available           Anesthesia Evaluation  Patient summary reviewed and Nursing notes reviewed no history of anesthetic complications:   Airway: Mallampati: II  TM distance: >3 FB   Neck ROM: full  Mouth opening: > = 3 FB Dental:    (+) edentulous      Pulmonary: breath sounds clear to auscultation  (+) COPD:  shortness of breath (pt states to get winded on occasion): new,  recent URI (cough prod greenish sputum developing over last 3-4 days):  current smoker                          ROS comment: Lung nodule- right lung   Cardiovascular:  Exercise tolerance: good (>4 METS),   (+) hyperlipidemia      ECG reviewed  Rhythm: regular  Rate: normal           Beta Blocker:  Not on Beta Blocker         Neuro/Psych:   Negative Neuro/Psych ROS              GI/Hepatic/Renal: Neg GI/Hepatic/Renal ROS            Endo/Other: Negative Endo/Other ROS                    Abdominal:           Vascular: negative vascular ROS.                                        Anesthesia Plan      ASA 3 (Discussed risk with acutely developing URI with pt and Dr Yessica Harley. Risk of pulmonary morbidity likely decreased by delaying surgery for medical optimization.)  Induction: intravenous. MIPS: Postoperative opioids intended and Prophylactic antiemetics administered. Anesthetic plan and risks discussed with patient. Plan discussed with CRNA.                 Papa Chatman MD   11/12/2020

## 2020-12-21 RX ORDER — GUAIFENESIN 1200 MG/1
TABLET, EXTENDED RELEASE ORAL DAILY
COMMUNITY
End: 2021-08-05

## 2020-12-21 RX ORDER — LORATADINE 10 MG/1
10 CAPSULE, LIQUID FILLED ORAL DAILY PRN
COMMUNITY

## 2020-12-21 RX ORDER — ACETAMINOPHEN 325 MG/1
650 TABLET ORAL EVERY 6 HOURS PRN
COMMUNITY

## 2020-12-21 NOTE — PROGRESS NOTES
Have you been tested for COVID  No  To be completed 12/23/2020         Have you been told you were positive for COVID No  Have you had any known exposure to someone that is positive for COVID No  Do you have a cough                   No              Do you have shortness of breath No                 Do you have a sore throat            No                Are you having chills                    No                Are you having muscle aches. No                    Please come to the hospital wearing a mask and have your significant other wear a mask as well. Both of you should check your temperature before leaving to come here,  if it is 100 or higher please call [phone] for instruction.

## 2020-12-21 NOTE — PROGRESS NOTES
Reviewed with Dr Alicia Cristina, pt surgery and date, had seen Dr Jasen Rodriguez 08/2020, notes on chart, for lung nodule and was to followup thereafter and was no show, medical clearance in epic 12/01/2020. No further orders.

## 2020-12-21 NOTE — PROGRESS NOTES
[facility] PRE-ADMISSION TESTING INSTRUCTIONS    The Preadmission Testing patient is instructed accordingly using the following criteria (check applicable):    ARRIVAL INSTRUCTIONS:  [x] Parking the day of Surgery is located in the Main Entrance lot. Upon entering the door, make an immediate right to the surgery reception desk    [x] Bring photo ID and insurance card    [] Bring in a copy of Living will or Durable Power of  papers. [x] Please be sure to arrange for responsible adult to provide transportation to and from the hospital    [x] Please arrange for responsible adult to be with you for the 24 hour period post procedure due to having anesthesia      GENERAL INSTRUCTIONS:    [x] Nothing by mouth after midnight, including gum, candy, mints or water    [x] You may brush your teeth, but do not swallow any water    [x] Take medications as instructed with 1-2 oz of water    [x] Stop herbal supplements and vitamins 5 days prior to procedure    [x] Follow preop dosing of blood thinners per physician instructions    [] Take 1/2 dose of evening insulin, but no insulin after midnight    [] No oral diabetic medications after midnight    [] If diabetic and have low blood sugar or feel symptomatic, take 1-2oz apple juice only    [] Bring inhalers day of surgery    [] Bring C-PAP/ Bi-Pap day of surgery    [] Bring urine specimen day of surgery    [x] Shower or bath with soap, lather and rinse well, AM of Surgery, no lotion, powders or creams to surgical site    [] Follow bowel prep as instructed per surgeon    [x] No tobacco products within 24 hours of surgery     [x] No alcohol or illegal drug use within 24 hours of surgery.     [x] Jewelry, body piercing's, eyeglasses, contact lenses and dentures are not permitted into surgery (bring cases)      [] Please do not wear any nail polish, make up or hair products on the day of surgery [x] You can expect a call the business day prior to procedure to notify you if your arrival time changes    [x] If you receive a survey after surgery we would greatly appreciate your comments    [] Parent/guardian of a minor must accompany their child and remain on the premises  the entire time they are under our care     [] Pediatric patients may bring favorite toy, blanket or comfort item with them    [] A caregiver or family member must remain with the patient during their stay if they are mentally handicapped, have dementia, disoriented or unable to use a call light or would be a safety concern if left unattended    [x] Please notify surgeon if you develop any illness between now and time of surgery (cold, cough, sore throat, fever, nausea, vomiting) or any signs of infections  including skin, wounds, and dental.    [x]  The Outpatient Pharmacy is available to fill your prescription here on your day of surgery, ask your preop nurse for details    [] Other instructions    EDUCATIONAL MATERIALS PROVIDED:    [] PAT Preoperative Education Packet/Booklet     [] Medication List    [] Transfusion bracelet applied with instructions    [] Shower with soap, lather and rinse well, and use CHG wipes provided the evening before surgery as instructed    [] Incentive spirometer with instructions

## 2020-12-23 ENCOUNTER — HOSPITAL ENCOUNTER (OUTPATIENT)
Age: 55
Discharge: HOME | End: 2020-12-25
Payer: COMMERCIAL

## 2020-12-23 PROCEDURE — U0003 INFECTIOUS AGENT DETECTION BY NUCLEIC ACID (DNA OR RNA); SEVERE ACUTE RESPIRATORY SYNDROME CORONAVIRUS 2 (SARS-COV-2) (CORONAVIRUS DISEASE [COVID-19]), AMPLIFIED PROBE TECHNIQUE, MAKING USE OF HIGH THROUGHPUT TECHNOLOGIES AS DESCRIBED BY CMS-2020-01-R: HCPCS

## 2020-12-25 LAB
SARS-COV-2: NOT DETECTED
SOURCE: NORMAL

## 2020-12-27 ENCOUNTER — ANESTHESIA EVENT (OUTPATIENT)
Dept: OPERATING ROOM | Age: 55
End: 2020-12-27
Payer: COMMERCIAL

## 2020-12-27 ASSESSMENT — LIFESTYLE VARIABLES: SMOKING_STATUS: 1

## 2020-12-28 ENCOUNTER — HOSPITAL ENCOUNTER (OUTPATIENT)
Age: 55
Setting detail: OUTPATIENT SURGERY
Discharge: HOME | End: 2020-12-28
Attending: ORTHOPAEDIC SURGERY | Admitting: ORTHOPAEDIC SURGERY
Payer: COMMERCIAL

## 2020-12-28 ENCOUNTER — ANESTHESIA (OUTPATIENT)
Dept: OPERATING ROOM | Age: 55
End: 2020-12-28
Payer: COMMERCIAL

## 2020-12-28 VITALS
SYSTOLIC BLOOD PRESSURE: 127 MMHG | HEIGHT: 70 IN | TEMPERATURE: 97.2 F | HEART RATE: 70 BPM | OXYGEN SATURATION: 94 % | DIASTOLIC BLOOD PRESSURE: 71 MMHG | RESPIRATION RATE: 18 BRPM | WEIGHT: 195 LBS | BODY MASS INDEX: 27.92 KG/M2

## 2020-12-28 VITALS — OXYGEN SATURATION: 94 % | SYSTOLIC BLOOD PRESSURE: 137 MMHG | DIASTOLIC BLOOD PRESSURE: 83 MMHG

## 2020-12-28 PROCEDURE — 64415 NJX AA&/STRD BRCH PLXS IMG: CPT | Performed by: ANESTHESIOLOGY

## 2020-12-28 PROCEDURE — 7100000010 HC PHASE II RECOVERY - FIRST 15 MIN: Performed by: ORTHOPAEDIC SURGERY

## 2020-12-28 PROCEDURE — 6360000002 HC RX W HCPCS: Performed by: ANESTHESIOLOGY

## 2020-12-28 PROCEDURE — 3700000001 HC ADD 15 MINUTES (ANESTHESIA): Performed by: ORTHOPAEDIC SURGERY

## 2020-12-28 PROCEDURE — 2709999900 HC NON-CHARGEABLE SUPPLY: Performed by: ORTHOPAEDIC SURGERY

## 2020-12-28 PROCEDURE — 29826 SHO ARTHRS SRG DECOMPRESSION: CPT | Performed by: ORTHOPAEDIC SURGERY

## 2020-12-28 PROCEDURE — 94664 DEMO&/EVAL PT USE INHALER: CPT

## 2020-12-28 PROCEDURE — 2720000010 HC SURG SUPPLY STERILE: Performed by: ORTHOPAEDIC SURGERY

## 2020-12-28 PROCEDURE — 6360000002 HC RX W HCPCS: Performed by: NURSE PRACTITIONER

## 2020-12-28 PROCEDURE — 3700000000 HC ANESTHESIA ATTENDED CARE: Performed by: ORTHOPAEDIC SURGERY

## 2020-12-28 PROCEDURE — 2580000003 HC RX 258: Performed by: NURSE ANESTHETIST, CERTIFIED REGISTERED

## 2020-12-28 PROCEDURE — 2500000003 HC RX 250 WO HCPCS: Performed by: NURSE ANESTHETIST, CERTIFIED REGISTERED

## 2020-12-28 PROCEDURE — 6360000002 HC RX W HCPCS: Performed by: ORTHOPAEDIC SURGERY

## 2020-12-28 PROCEDURE — 7100000000 HC PACU RECOVERY - FIRST 15 MIN: Performed by: ORTHOPAEDIC SURGERY

## 2020-12-28 PROCEDURE — 7100000001 HC PACU RECOVERY - ADDTL 15 MIN: Performed by: ORTHOPAEDIC SURGERY

## 2020-12-28 PROCEDURE — C1713 ANCHOR/SCREW BN/BN,TIS/BN: HCPCS | Performed by: ORTHOPAEDIC SURGERY

## 2020-12-28 PROCEDURE — 2500000003 HC RX 250 WO HCPCS: Performed by: ORTHOPAEDIC SURGERY

## 2020-12-28 PROCEDURE — 3600000004 HC SURGERY LEVEL 4 BASE: Performed by: ORTHOPAEDIC SURGERY

## 2020-12-28 PROCEDURE — 3600000014 HC SURGERY LEVEL 4 ADDTL 15MIN: Performed by: ORTHOPAEDIC SURGERY

## 2020-12-28 PROCEDURE — 6360000002 HC RX W HCPCS: Performed by: NURSE ANESTHETIST, CERTIFIED REGISTERED

## 2020-12-28 PROCEDURE — 7100000011 HC PHASE II RECOVERY - ADDTL 15 MIN: Performed by: ORTHOPAEDIC SURGERY

## 2020-12-28 PROCEDURE — 29827 SHO ARTHRS SRG RT8TR CUF RPR: CPT | Performed by: ORTHOPAEDIC SURGERY

## 2020-12-28 DEVICE — ANCHOR SUTURE BIOCOMP 4.75X22 MM DBL LD WHT SWIVELOCK C: Type: IMPLANTABLE DEVICE | Status: FUNCTIONAL

## 2020-12-28 RX ORDER — PROMETHAZINE HYDROCHLORIDE 25 MG/ML
6.25 INJECTION, SOLUTION INTRAMUSCULAR; INTRAVENOUS
Status: DISCONTINUED | OUTPATIENT
Start: 2020-12-28 | End: 2020-12-28 | Stop reason: HOSPADM

## 2020-12-28 RX ORDER — SODIUM CHLORIDE 0.9 % (FLUSH) 0.9 %
10 SYRINGE (ML) INJECTION EVERY 12 HOURS SCHEDULED
Status: DISCONTINUED | OUTPATIENT
Start: 2020-12-28 | End: 2020-12-28 | Stop reason: HOSPADM

## 2020-12-28 RX ORDER — PROMETHAZINE HYDROCHLORIDE 25 MG/ML
INJECTION, SOLUTION INTRAMUSCULAR; INTRAVENOUS PRN
Status: DISCONTINUED | OUTPATIENT
Start: 2020-12-28 | End: 2020-12-28 | Stop reason: SDUPTHER

## 2020-12-28 RX ORDER — ROPIVACAINE HYDROCHLORIDE 5 MG/ML
30 INJECTION, SOLUTION EPIDURAL; INFILTRATION; PERINEURAL ONCE
Status: COMPLETED | OUTPATIENT
Start: 2020-12-28 | End: 2020-12-28

## 2020-12-28 RX ORDER — LABETALOL HYDROCHLORIDE 5 MG/ML
INJECTION, SOLUTION INTRAVENOUS PRN
Status: DISCONTINUED | OUTPATIENT
Start: 2020-12-28 | End: 2020-12-28 | Stop reason: SDUPTHER

## 2020-12-28 RX ORDER — ROPIVACAINE HYDROCHLORIDE 5 MG/ML
INJECTION, SOLUTION EPIDURAL; INFILTRATION; PERINEURAL
Status: COMPLETED | OUTPATIENT
Start: 2020-12-28 | End: 2020-12-28

## 2020-12-28 RX ORDER — FENTANYL CITRATE 50 UG/ML
INJECTION, SOLUTION INTRAMUSCULAR; INTRAVENOUS PRN
Status: DISCONTINUED | OUTPATIENT
Start: 2020-12-28 | End: 2020-12-28 | Stop reason: SDUPTHER

## 2020-12-28 RX ORDER — KETOROLAC TROMETHAMINE 30 MG/ML
INJECTION, SOLUTION INTRAMUSCULAR; INTRAVENOUS PRN
Status: DISCONTINUED | OUTPATIENT
Start: 2020-12-28 | End: 2020-12-28 | Stop reason: SDUPTHER

## 2020-12-28 RX ORDER — MIDAZOLAM HYDROCHLORIDE 2 MG/2ML
1 INJECTION, SOLUTION INTRAMUSCULAR; INTRAVENOUS PRN
Status: DISCONTINUED | OUTPATIENT
Start: 2020-12-28 | End: 2020-12-28 | Stop reason: HOSPADM

## 2020-12-28 RX ORDER — SODIUM CHLORIDE 0.9 % (FLUSH) 0.9 %
10 SYRINGE (ML) INJECTION PRN
Status: DISCONTINUED | OUTPATIENT
Start: 2020-12-28 | End: 2020-12-28 | Stop reason: HOSPADM

## 2020-12-28 RX ORDER — ROCURONIUM BROMIDE 10 MG/ML
INJECTION, SOLUTION INTRAVENOUS PRN
Status: DISCONTINUED | OUTPATIENT
Start: 2020-12-28 | End: 2020-12-28 | Stop reason: SDUPTHER

## 2020-12-28 RX ORDER — ONDANSETRON 2 MG/ML
INJECTION INTRAMUSCULAR; INTRAVENOUS PRN
Status: DISCONTINUED | OUTPATIENT
Start: 2020-12-28 | End: 2020-12-28 | Stop reason: SDUPTHER

## 2020-12-28 RX ORDER — DEXAMETHASONE SODIUM PHOSPHATE 4 MG/ML
INJECTION, SOLUTION INTRA-ARTICULAR; INTRALESIONAL; INTRAMUSCULAR; INTRAVENOUS; SOFT TISSUE PRN
Status: DISCONTINUED | OUTPATIENT
Start: 2020-12-28 | End: 2020-12-28 | Stop reason: SDUPTHER

## 2020-12-28 RX ORDER — OXYCODONE HYDROCHLORIDE AND ACETAMINOPHEN 5; 325 MG/1; MG/1
1 TABLET ORAL
Status: DISCONTINUED | OUTPATIENT
Start: 2020-12-28 | End: 2020-12-28 | Stop reason: HOSPADM

## 2020-12-28 RX ORDER — FENTANYL CITRATE 50 UG/ML
25 INJECTION, SOLUTION INTRAMUSCULAR; INTRAVENOUS EVERY 5 MIN PRN
Status: DISCONTINUED | OUTPATIENT
Start: 2020-12-28 | End: 2020-12-28 | Stop reason: HOSPADM

## 2020-12-28 RX ORDER — LIDOCAINE HYDROCHLORIDE 20 MG/ML
INJECTION, SOLUTION EPIDURAL; INFILTRATION; INTRACAUDAL; PERINEURAL PRN
Status: DISCONTINUED | OUTPATIENT
Start: 2020-12-28 | End: 2020-12-28 | Stop reason: SDUPTHER

## 2020-12-28 RX ORDER — FENTANYL CITRATE 50 UG/ML
50 INJECTION, SOLUTION INTRAMUSCULAR; INTRAVENOUS EVERY 5 MIN PRN
Status: DISCONTINUED | OUTPATIENT
Start: 2020-12-28 | End: 2020-12-28 | Stop reason: HOSPADM

## 2020-12-28 RX ORDER — ALBUTEROL SULFATE 2.5 MG/3ML
2.5 SOLUTION RESPIRATORY (INHALATION) EVERY 6 HOURS PRN
Status: DISCONTINUED | OUTPATIENT
Start: 2020-12-28 | End: 2020-12-28 | Stop reason: HOSPADM

## 2020-12-28 RX ORDER — MIDAZOLAM HYDROCHLORIDE 1 MG/ML
INJECTION INTRAMUSCULAR; INTRAVENOUS PRN
Status: DISCONTINUED | OUTPATIENT
Start: 2020-12-28 | End: 2020-12-28 | Stop reason: SDUPTHER

## 2020-12-28 RX ORDER — PROPOFOL 10 MG/ML
INJECTION, EMULSION INTRAVENOUS PRN
Status: DISCONTINUED | OUTPATIENT
Start: 2020-12-28 | End: 2020-12-28 | Stop reason: SDUPTHER

## 2020-12-28 RX ORDER — MEPERIDINE HYDROCHLORIDE 25 MG/ML
12.5 INJECTION INTRAMUSCULAR; INTRAVENOUS; SUBCUTANEOUS EVERY 5 MIN PRN
Status: DISCONTINUED | OUTPATIENT
Start: 2020-12-28 | End: 2020-12-28 | Stop reason: HOSPADM

## 2020-12-28 RX ORDER — SODIUM CHLORIDE 9 MG/ML
INJECTION, SOLUTION INTRAVENOUS CONTINUOUS PRN
Status: DISCONTINUED | OUTPATIENT
Start: 2020-12-28 | End: 2020-12-28 | Stop reason: SDUPTHER

## 2020-12-28 RX ORDER — DIPHENHYDRAMINE HYDROCHLORIDE 50 MG/ML
12.5 INJECTION INTRAMUSCULAR; INTRAVENOUS
Status: DISCONTINUED | OUTPATIENT
Start: 2020-12-28 | End: 2020-12-28 | Stop reason: HOSPADM

## 2020-12-28 RX ORDER — EPINEPHRINE 1 MG/ML
INJECTION, SOLUTION, CONCENTRATE INTRAVENOUS PRN
Status: DISCONTINUED | OUTPATIENT
Start: 2020-12-28 | End: 2020-12-28 | Stop reason: ALTCHOICE

## 2020-12-28 RX ORDER — FENTANYL CITRATE 50 UG/ML
50 INJECTION, SOLUTION INTRAMUSCULAR; INTRAVENOUS PRN
Status: DISCONTINUED | OUTPATIENT
Start: 2020-12-28 | End: 2020-12-28 | Stop reason: HOSPADM

## 2020-12-28 RX ADMIN — ROCURONIUM BROMIDE 20 MG: 10 INJECTION, SOLUTION INTRAVENOUS at 12:20

## 2020-12-28 RX ADMIN — MIDAZOLAM HYDROCHLORIDE 2 MG: 1 INJECTION, SOLUTION INTRAMUSCULAR; INTRAVENOUS at 10:52

## 2020-12-28 RX ADMIN — ALBUTEROL SULFATE 2.5 MG: 2.5 SOLUTION RESPIRATORY (INHALATION) at 13:28

## 2020-12-28 RX ADMIN — KETOROLAC TROMETHAMINE 30 MG: 30 INJECTION, SOLUTION INTRAMUSCULAR; INTRAVENOUS at 12:45

## 2020-12-28 RX ADMIN — SODIUM CHLORIDE: 9 INJECTION, SOLUTION INTRAVENOUS at 11:19

## 2020-12-28 RX ADMIN — ROPIVACAINE HYDROCHLORIDE 30 ML: 5 INJECTION, SOLUTION EPIDURAL; INFILTRATION; PERINEURAL at 10:57

## 2020-12-28 RX ADMIN — PROPOFOL 200 MG: 10 INJECTION, EMULSION INTRAVENOUS at 11:26

## 2020-12-28 RX ADMIN — ONDANSETRON 4 MG: 2 INJECTION INTRAMUSCULAR; INTRAVENOUS at 12:45

## 2020-12-28 RX ADMIN — ROPIVACAINE HYDROCHLORIDE 29 ML: 5 INJECTION, SOLUTION EPIDURAL; INFILTRATION; PERINEURAL at 11:00

## 2020-12-28 RX ADMIN — DEXAMETHASONE SODIUM PHOSPHATE 10 MG: 4 INJECTION, SOLUTION INTRAMUSCULAR; INTRAVENOUS at 11:35

## 2020-12-28 RX ADMIN — LABETALOL HYDROCHLORIDE 2.5 MG: 5 INJECTION INTRAVENOUS at 12:02

## 2020-12-28 RX ADMIN — MIDAZOLAM 2 MG: 1 INJECTION INTRAMUSCULAR; INTRAVENOUS at 11:19

## 2020-12-28 RX ADMIN — PROMETHAZINE HYDROCHLORIDE 6.25 MG: 25 INJECTION INTRAMUSCULAR; INTRAVENOUS at 12:45

## 2020-12-28 RX ADMIN — SUGAMMADEX 250 MG: 100 INJECTION, SOLUTION INTRAVENOUS at 12:52

## 2020-12-28 RX ADMIN — SODIUM CHLORIDE: 9 INJECTION, SOLUTION INTRAVENOUS at 12:26

## 2020-12-28 RX ADMIN — FENTANYL CITRATE 50 MCG: 50 INJECTION, SOLUTION INTRAMUSCULAR; INTRAVENOUS at 11:26

## 2020-12-28 RX ADMIN — FENTANYL CITRATE 50 MCG: 50 INJECTION, SOLUTION INTRAMUSCULAR; INTRAVENOUS at 11:59

## 2020-12-28 RX ADMIN — FENTANYL CITRATE 50 MCG: 50 INJECTION, SOLUTION INTRAMUSCULAR; INTRAVENOUS at 10:52

## 2020-12-28 RX ADMIN — LIDOCAINE HYDROCHLORIDE 60 MG: 20 INJECTION, SOLUTION EPIDURAL; INFILTRATION; INTRACAUDAL; PERINEURAL at 11:26

## 2020-12-28 RX ADMIN — Medication 2 G: at 11:35

## 2020-12-28 RX ADMIN — ROCURONIUM BROMIDE 50 MG: 10 INJECTION, SOLUTION INTRAVENOUS at 11:26

## 2020-12-28 ASSESSMENT — PULMONARY FUNCTION TESTS
PIF_VALUE: 21
PIF_VALUE: 21
PIF_VALUE: 20
PIF_VALUE: 11
PIF_VALUE: 21
PIF_VALUE: 14
PIF_VALUE: 21
PIF_VALUE: 2
PIF_VALUE: 3
PIF_VALUE: 21
PIF_VALUE: 21
PIF_VALUE: 7
PIF_VALUE: 21
PIF_VALUE: 2
PIF_VALUE: 1
PIF_VALUE: 13
PIF_VALUE: 0
PIF_VALUE: 21
PIF_VALUE: 14
PIF_VALUE: 21
PIF_VALUE: 1
PIF_VALUE: 15
PIF_VALUE: 2
PIF_VALUE: 21
PIF_VALUE: 20
PIF_VALUE: 3
PIF_VALUE: 21
PIF_VALUE: 14
PIF_VALUE: 21
PIF_VALUE: 2
PIF_VALUE: 21
PIF_VALUE: 20
PIF_VALUE: 21
PIF_VALUE: 20
PIF_VALUE: 2
PIF_VALUE: 21
PIF_VALUE: 21
PIF_VALUE: 26
PIF_VALUE: 14
PIF_VALUE: 21
PIF_VALUE: 21
PIF_VALUE: 15
PIF_VALUE: 21
PIF_VALUE: 19
PIF_VALUE: 14
PIF_VALUE: 21
PIF_VALUE: 22
PIF_VALUE: 2
PIF_VALUE: 17
PIF_VALUE: 13
PIF_VALUE: 20
PIF_VALUE: 15
PIF_VALUE: 21
PIF_VALUE: 19
PIF_VALUE: 21
PIF_VALUE: 0
PIF_VALUE: 1
PIF_VALUE: 21
PIF_VALUE: 20
PIF_VALUE: 21
PIF_VALUE: 16
PIF_VALUE: 15
PIF_VALUE: 0
PIF_VALUE: 21
PIF_VALUE: 18
PIF_VALUE: 21
PIF_VALUE: 1
PIF_VALUE: 21
PIF_VALUE: 21
PIF_VALUE: 20
PIF_VALUE: 25
PIF_VALUE: 25
PIF_VALUE: 26
PIF_VALUE: 21
PIF_VALUE: 20
PIF_VALUE: 1
PIF_VALUE: 20
PIF_VALUE: 21
PIF_VALUE: 21
PIF_VALUE: 20
PIF_VALUE: 20
PIF_VALUE: 15
PIF_VALUE: 17
PIF_VALUE: 21
PIF_VALUE: 15
PIF_VALUE: 21
PIF_VALUE: 2
PIF_VALUE: 14
PIF_VALUE: 21
PIF_VALUE: 1
PIF_VALUE: 21

## 2020-12-28 ASSESSMENT — PAIN SCALES - GENERAL
PAINLEVEL_OUTOF10: 0
PAINLEVEL_OUTOF10: 0

## 2020-12-28 ASSESSMENT — PAIN - FUNCTIONAL ASSESSMENT: PAIN_FUNCTIONAL_ASSESSMENT: 0-10

## 2020-12-28 NOTE — ANESTHESIA PROCEDURE NOTES
Peripheral Block    Patient location during procedure: pre-op  Start time: 12/28/2020 10:53 AM  End time: 12/28/2020 11:00 AM  Staffing  Performed: anesthesiologist   Anesthesiologist: Tiffanie Ruth MD  Preanesthetic Checklist  Completed: patient identified, IV checked, site marked, risks and benefits discussed, surgical consent, monitors and equipment checked, pre-op evaluation, timeout performed, anesthesia consent given, oxygen available and patient being monitored  Peripheral Block  Patient position: supine  Prep: ChloraPrep  Patient monitoring: cardiac monitor, continuous pulse ox, frequent blood pressure checks and IV access  Block type: Brachial plexus  Laterality: right  Injection technique: single-shot  Guidance: ultrasound guided  Local infiltration: ropivacaine  Infiltration strength: 0.5 %  Dose: 1 mL  Interscalene  Provider prep: mask and sterile gloves  Local infiltration: ropivacaine  Needle  Needle type: combined needle/nerve stimulator   Needle gauge: 21 G  Needle length: 5 cm  Needle localization: ultrasound guidance  Assessment  Injection assessment: negative aspiration for heme, no paresthesia on injection and local visualized surrounding nerve on ultrasound  Paresthesia pain: none  Slow fractionated injection: yes  Hemodynamics: stable  Additional Notes  Tolerated procedure well. No complications. Total of 2 mg IV midazolam and 50 mcg IV fentanyl given for nerve block.   Medications Administered  Ropivacaine (NAROPIN) injection 0.5%, 29 mL  Reason for block: post-op pain management and at surgeon's request

## 2020-12-28 NOTE — H&P
Department of Orthopedic Surgery  Attending Pre-operative History and Physical        DIAGNOSIS: Right shoulder pain    INDICATION: Failed conservative treatment    PROCEDURE: Right shoulder arthroscopy rotator cuff repair versus debridement    CHIEF COMPLAINT: Shoulder pain    History Obtained From:  patient, electronic medical record    HISTORY OF PRESENT ILLNESS:      The patient is a 50 y.o. male with significant past medical history of right shoulder pain rotator cuff tear and biceps tear. Patient reports continued right shoulder pain. Patient has failed extensive conservative treatment including physical therapy, home exercise regimen, activity modification, and cortisone injections. For this reason he wishes to proceed with surgical management specifically a right shoulder arthroscopy rotator cuff repair versus debridement. We discussed in detail the risks of surgery today. The risks include but are not limited to infection, neurovascular injury, irreparable rotator cuff repair requiring debridement, failure of repair due to poor tissue quality, need for revision surgery or future surgeries down the road, as well as the risks of general anesthesia. Patient verbalized understanding of the risks and still wishes to proceed. Past Medical History:        Diagnosis Date    Arthritis     BACK- DDD    Back pain 1995    D/T TRAUMA FRACTURE BACK    COPD (chronic obstructive pulmonary disease) ([facility] [address]     DR OLIVER-    Diverticulitis     Edentulous     Full dentures     Hyperlipidemia     Lung nodule 07/2020    RUL     Right shoulder injury 06/06/2020    Work injury per wife       Past Surgical History:        Procedure Laterality Date    CARPAL TUNNEL RELEASE Bilateral 2018 2017.   DR Devan Mckay    COLONOSCOPY  2016   [address] [place] [address]    DETACHED RETINA    THORACOSCOPY Right 8/30/2019 BRONCH, ROBOTIC RIGHT VIDEO ASSISTED THORACOSCOPY, UPPER LOBE WEDGE RESECTION, POSSIBLE LOBECTOMY performed by Alejandro Morales MD at [address] [place] [address]       Medications Prior to Admission:   Medications Prior to Admission: guaiFENesin (MUCINEX MAXIMUM STRENGTH) 1200 MG TB12, Take by mouth daily  HYDROcodone-acetaminophen (NORCO)  MG per tablet, Take 1 tablet by mouth every 6 hours as needed for Pain for up to 30 days. albuterol sulfate HFA (VENTOLIN HFA) 108 (90 Base) MCG/ACT inhaler, Inhale 2 puffs into the lungs every 4-6 hours as needed for Wheezing  albuterol sulfate  (90 Base) MCG/ACT inhaler, Inhale 2 puffs into the lungs 4 times daily as needed for Wheezing  acetaminophen (TYLENOL) 325 MG tablet, Take 650 mg by mouth every 6 hours as needed for Pain  loratadine (CLARITIN) 10 MG capsule, Take 10 mg by mouth daily as needed  ketorolac (TORADOL) 10 MG tablet, Take 1 tablet by mouth every 6 hours as needed for Pain  ibuprofen (ADVIL;MOTRIN) 800 MG tablet, Take 1 tablet by mouth every 6 hours as needed for Pain  Umeclidinium Bromide 62.5 MCG/INH AEPB, Inhale 1 puff into the lungs daily    Allergies:  Patient has no known allergies. History of allergic reaction to anesthesia:  No    Social History:   TOBACCO:   reports that he has been smoking cigarettes. He has a 105.00 pack-year smoking history. He has never used smokeless tobacco.  ETOH:   reports no history of alcohol use. DRUGS:   reports no history of drug use.     Family History:       Problem Relation Age of Onset    Cancer Mother         LUNG    Cancer Father         LUNG       REVIEW OF SYSTEMS:  CONSTITUTIONAL:  negative  RESPIRATORY:  negative  CARDIOVASCULAR:  negative  MUSCULOSKELETAL:  negative except for  HPI  NEUROLOGICAL:  negative    PHYSICAL EXAM: VITALS:  /70   Pulse 90   Temp 96.8 °F (36 °C) (Temporal)   Resp 18   Ht 5' 10\" (1.778 m)   Wt 195 lb (88.5 kg)   SpO2 93%   BMI 27.98 kg/m²     Gen: AOx3, NAD    Heart:  normal apical pulses, normal S1 and S2 and no edema    Lungs:  No increased work of breathing, good air exchange     Abdomen:  no scars, non-distended and non-tender    Extremities:  No clubbing, cyanosis, or edema    Musculoskeletal: Right shoulder exam range of motion is 170/60/L5. No swelling or deformity was visualized on inspection. No tenderness to palpation. Belly press exam shows stiffness with pain. Positive liftoff exam.  Paula Sayer and Speed's exams were positive for pain. DATA:  CBC:   Lab Results   Component Value Date    WBC 14.2 06/16/2020    RBC 5.14 09/04/2019    HGB 15.1 09/04/2019    HCT 44.2 09/04/2019    MCV 86.0 09/04/2019    MCH 29.4 09/04/2019    MCHC 34.2 09/04/2019    RDW 13.1 09/04/2019     09/04/2019    MPV 9.3 09/04/2019     BMP:    Lab Results   Component Value Date     09/04/2019    K 4.3 09/04/2019    K 4.8 08/28/2019     09/04/2019    CO2 25 09/04/2019    BUN 21 09/04/2019    LABALBU 4.4 08/28/2019    CREATININE 0.8 09/04/2019    CALCIUM 9.3 09/04/2019    GFRAA >60 09/04/2019    LABGLOM >60 09/04/2019    GLUCOSE 97 09/04/2019       Radiology Review: MRI findings of right shoulder show abnormal signal involving subscapularis    ASSESSMENT AND PLAN:    1. Patient is a 50 y.o. male with above specified procedure planned right shoulder arthroscopy rotator cuff repair versus debridement with anesthesia. 2.  Procedure options, risks and benefits reviewed with patient. Patient expresses understanding and has signed consent form to proceed.     3.  Patient and family were provided with pre-op and post-op instructions, prescription medications, and any other supplied needed post op (slings, braces, etc.) Controlled substances monitoring: possible medication side effects, risk of tolerance and/or dependence, and alternative treatments discussed, no signs of potential drug abuse or diversion identified and OARRS report reviewed today- activity consistent with treatment plan.         Roxy Mccormack CNP  Orthopaedic Surgery   12/28/20  10:26 AM

## 2020-12-28 NOTE — OP NOTE
OPERATIVE REPORT    PATIENT:  Zelda Gil  [id number]    DATE OF PROCEDURE:  12/28/20    SURGEON: Vicki Appiah MD    ASSISTANT:  Marisela Arana DO; Joey Ford CNP. CNP was necessary for positioning, prepping/draping, intra-operative assistance, and wound closure      PREOPERATIVE DIAGNOSIS: Rotator cuff tear, long head biceps tendon rupture right shoulder    POSTOPERATIVE DIAGNOSIS: Same, subacromial impingement    OPERATION:  Right shoulder arthroscopy, subacromial decompression with acromioplasty, subscapularis repair single anchor    ANESTHESIA: . general and interscalene block    OPERATIVE INDICATIONS:  The patient is a 62-year-old male who has suffered from right shoulder pain stemming from a work injury. He has identified long head biceps tendon rupture but has had persistent pain mainly in the anterior shoulder. Imaging was concerning for subscapularis tear. Given his failure of conservative treatment he is interested in surgery. We discussed surgery would involve right shoulder arthroscopy, subacromial decompression, possible rotator cuff repair versus debridement. The risks and benefits, as well as alternatives were discussed at length with the patient in detail.   These risks include, but are not limited to infection, nerve and/or blood vessel injury, intra or post operative fracture, need for revision surgery, failure of implants, deep vein thrombosis and pulmonary embolism, shoulder stiffness, decreased motion, persistent pain, and the risks of general anesthesia provided by the anesthesiologist.   The patient understood these risks, signed an informed consent, and elected to proceed OPERATIVE PROCEDURE: After satisfactory general anesthesia, as well as scalene block, the patient was placed supine on the operative table on a bean bag. The shoulder was examined under anesthesia and range of motion was noted to be 180 degrees forward elevation, and with the shoulder abducted 90 degrees, 90 external and 50 internal rotation. There was not increased translation with anterior/posterior load and shift. The patient was then turned into the lateral position with operative shoulder up. A bean bag was inflated to secure her in this position, and all bony prominences were well padded. An axillary roll was placed. The arm was cleaned with alcohol and axilla shaved. The operative extremity was then prepped and draped in sterile fashion. Prior to procedure start, a time out was performed including confirmation of operative site and operation to be performed. Antibiotic prophylaxis, 2 g  Ancef was given prior to incision. The borders of the scapula and clavicle were marked with a marking pen as were planned portal sites. An 18 gauge spinal needle on a syringe of local anesthetic was inserted at the posterior portal site, into the joint, and aspiration was performed, showing yellowish joint fluid, confirming intra-articular placement. Local anesthetic was injected as the needle was withdrawn. An 11 blade was utilized to make a skin incision for planned posterior portal, which was followed by the scope trocar, and arthroscope. Diagnostic arthroscopy ensued. We noted absence of the long head biceps tendon. There was degenerative tearing and fraying of the anterior labrum that was debrided with a shaver. There was some global grade 2 changes of the humeral cartilage as well as glenoid. The supraspinatus was intact. At first glance subscapularis appeared normal but with further dissection and posterior lever push there was a tear involving the very distal insertion, articular sided. Next, an anterior portal was established through the rotator interval between the subscapularis and biceps in the anterosuperolateral position. An Arthrex canula was inserted. We came in and passed an inverted mattress suture through the tendon. This was placed through a swivel lock anchor, brought up and the lesser tuberosity was tapped and the anchor was inserted with excellent purchase giving us a low-profile knotless repair that was stable to probing and range of motion. We did note the subcoracoid space was very tight, so we utilized the motorized bur to remove about 3 to 4 mm of bone and open up the space nicely so that there was no impingement on our subscapularis repair. Next, the arthrscope was withdrawn, trocar placed, and subacromial space entered. There was a fair amount of bursitis. Subacromial decompression and bursectomy then ensued with shaver and radiofrequency probe. We noted that the acromion slope downward significantly anterior. We peeled off the CA ligament and then utilized a motorized bur to remove about 4 mm of inferior bone. This opened up the space nicely. We examined the rotator cuff carefully and noted that there was no tearing and the tendon was intact with palpation and range of motion. The camera was returned to the joint through the posterior portal.  Final pictures were taken. The scope was withdrawn and fluid removed via suction. The wounds were closed with buried 4-O moncryl. The wounds were covered with steri strips, xeroform, 4x4, and tape. The shoulder was placed in a sling. The patient was awoken from anesthesia and transferred to the recovery room in stable condition. IMPLANTS:   Implant Name Type Inv.  Item Serial No.  Lot No. LRB No. Used Action   ANCHOR SUTURE BIOCOMP 4.75X22 MM DBL LD WHT SWIVELOCK C  ANCHOR SUTURE BIOCOMP 4.75X22 MM DBL LD WHT [facility]- [id number] Right 1 Implanted         ESTIMATED BLOOD LOSS:  5 mL COMPLICATIONS: none    POST OPERATIVE PLAN: The patient will discharged home from PACU once stable. Follow up in office will be post operative day 1 or 2. Dressing will stay on and ice applied until follow up. The patient will remain in the sling.         Umm Urena MD  Orthopaedic Surgery   12/28/20  12:53 PM

## 2020-12-28 NOTE — ANESTHESIA PRE PROCEDURE
Department of Anesthesiology  Preprocedure Note       Name:  Celestine Norton. Age:  50 y.o.  :  1965                                          MRN:  [id number]         Date:  2020      Surgeon: Inge Miranda):  Valentine Arana MD    Procedure: Procedure(s):  RIGHT SHOULDER ARTHROSCOPY ROTATOR CUFF REPAIR VS DEBRIDEMENT POSSIBLE BICEP TENOTOMY VS TENODESIS  POSSIBLE OPEN  ++ARTHREX++ ++ISB++    Medications prior to admission:   Prior to Admission medications    Medication Sig Start Date End Date Taking? Authorizing Provider   guaiFENesin (MUCINEX MAXIMUM STRENGTH) 1200 MG TB12 Take by mouth daily   Yes Historical Provider, MD   HYDROcodone-acetaminophen (NORCO)  MG per tablet Take 1 tablet by mouth every 6 hours as needed for Pain for up to 30 days.  12/16/20 1/15/21 Yes Prachi Martin PA-C   albuterol sulfate HFA (VENTOLIN HFA) 108 (90 Base) MCG/ACT inhaler Inhale 2 puffs into the lungs every 4-6 hours as needed for Wheezing 20  Yes Prachi Martin PA-C   albuterol sulfate  (90 Base) MCG/ACT inhaler Inhale 2 puffs into the lungs 4 times daily as needed for Wheezing 19  Yes Prachi Martin PA-C   acetaminophen (TYLENOL) 325 MG tablet Take 650 mg by mouth every 6 hours as needed for Pain    Historical Provider, MD   loratadine (CLARITIN) 10 MG capsule Take 10 mg by mouth daily as needed    Historical Provider, MD   ketorolac (TORADOL) 10 MG tablet Take 1 tablet by mouth every 6 hours as needed for Pain 20  Valentine Arana MD   ibuprofen (ADVIL;MOTRIN) 800 MG tablet Take 1 tablet by mouth every 6 hours as needed for Pain 20  Kiya Amaral MD   Umeclidinium Bromide 62.5 MCG/INH AEPB Inhale 1 puff into the lungs daily 12/3/19   Roma Harvey MD       Current medications:    Current Facility-Administered Medications   Medication Dose Route Frequency Provider Last Rate Last Admin  ceFAZolin (ANCEF) 2 g in sterile water 20 mL IV syringe  2 g Intravenous On Call to [facility], APRN - CNP        sodium chloride flush 0.9 % injection 10 mL  10 mL Intravenous 2 times per day Larosina Rodriguez, APRN - CNP        sodium chloride flush 0.9 % injection 10 mL  10 mL Intravenous PRN Launie Million, APRN - CNP        fentaNYL (SUBLIMAZE) injection 50 mcg  50 mcg Intravenous PRN David Alvarado MD        midazolam PF (VERSED) injection 1 mg  1 mg Intravenous PRN Giovanna Salazar MD        ropivacaine (NAROPIN) 0.5% injection 30 mL  30 mL Infiltration Once Giovanna Salazar MD           Allergies:  No Known Allergies    Problem List:    Patient Active Problem List   Diagnosis Code    Mass of right lung R91.8    Chronic midline low back pain without sciatica M54.5, G89.29       Past Medical History:        Diagnosis Date    Arthritis     BACK- DDD    Back pain 1995    D/T TRAUMA FRACTURE BACK    COPD (chronic obstructive pulmonary disease) ([facility] [address]     DR OLIVER-    Diverticulitis     Edentulous     Full dentures     Hyperlipidemia     Lung nodule 07/2020    RUL     Right shoulder injury 06/06/2020    Work injury per wife       Past Surgical History:        Procedure Laterality Date    CARPAL TUNNEL RELEASE Bilateral 2018 2017.   DR Ferraro Pen    COLONOSCOPY  2016    EYE SURGERY  1980    DETACHED RETINA    THORACOSCOPY Right 8/30/2019    BRONCH, ROBOTIC RIGHT VIDEO ASSISTED THORACOSCOPY, UPPER LOBE WEDGE RESECTION, POSSIBLE LOBECTOMY performed by Florentin Szymanski MD at [address] [place] [address]       Social History:    Social History     Tobacco Use    Smoking status: Current Every Day Smoker     Packs/day: 3.00     Years: 35.00     Pack years: 105.00     Types: Cigarettes    Smokeless tobacco: Never Used    Tobacco comment: trying to quit down to pack a day from 4-5 packs   Substance Use Topics    Alcohol use: Never     Frequency: Never Ready to quit: Not Answered  Counseling given: Not Answered  Comment: trying to quit down to pack a day from 4-5 packs      Vital Signs (Current):   Vitals:    12/21/20 1238 12/28/20 1021   BP:  121/70   Pulse:  90   Resp:  18   Temp:  96.8 °F (36 °C)   TempSrc:  Temporal   SpO2:  93%   Weight: 195 lb (88.5 kg) 195 lb (88.5 kg)   Height: 5' 10\" (1.778 m) 5' 10\" (1.778 m)                                              BP Readings from Last 3 Encounters:   12/28/20 121/70   12/01/20 132/88   11/12/20 122/75       NPO Status: Time of last liquid consumption: 2200                        Time of last solid consumption: 2200                        Date of last liquid consumption: 12/27/20                        Date of last solid food consumption: 12/27/20    BMI:   Wt Readings from Last 3 Encounters:   12/28/20 195 lb (88.5 kg)   12/01/20 195 lb (88.5 kg)   11/12/20 195 lb (88.5 kg)     Body mass index is 27.98 kg/m². CBC:   Lab Results   Component Value Date    WBC 14.2 06/16/2020    RBC 5.14 09/04/2019    HGB 15.1 09/04/2019    HCT 44.2 09/04/2019    MCV 86.0 09/04/2019    RDW 13.1 09/04/2019     09/04/2019       CMP:   Lab Results   Component Value Date     09/04/2019    K 4.3 09/04/2019    K 4.8 08/28/2019     09/04/2019    CO2 25 09/04/2019    BUN 21 09/04/2019    CREATININE 0.8 09/04/2019    GFRAA >60 09/04/2019    LABGLOM >60 09/04/2019    GLUCOSE 97 09/04/2019    PROT 7.8 08/28/2019    CALCIUM 9.3 09/04/2019    BILITOT 0.3 08/28/2019    ALKPHOS 73 08/28/2019    AST 17 08/28/2019    ALT 16 08/28/2019       POC Tests: No results for input(s): POCGLU, POCNA, POCK, POCCL, POCBUN, POCHEMO, POCHCT in the last 72 hours.     Coags:   Lab Results   Component Value Date    PROTIME 11.2 08/28/2019    INR 1.0 08/28/2019       HCG (If Applicable): No results found for: PREGTESTUR, PREGSERUM, HCG, HCGQUANT ABGs: No results found for: PHART, PO2ART, KGD3NWM, KSG8TWH, BEART, D6YFOSFJ     Type & Screen (If Applicable):  No results found for: LABABO, LABRH    Drug/Infectious Status (If Applicable):  No results found for: HIV, HEPCAB    COVID-19 Screening (If Applicable):   Lab Results   Component Value Date    COVID19 Not Detected 12/23/2020         Anesthesia Evaluation  Patient summary reviewed no history of anesthetic complications:   Airway: Mallampati: II  TM distance: >3 FB   Neck ROM: full  Mouth opening: > = 3 FB Dental:    (+) edentulous      Pulmonary: breath sounds clear to auscultation  (+) COPD:  current smoker                          ROS comment:   S/p robotic right upper lobe wedge resection and mediastinal lymph node dissection in August 2019 (due to right upper lobe lung mass)   Cardiovascular:    (+) hyperlipidemia        Rhythm: regular  Rate: normal                    Neuro/Psych:                ROS comment: Chronic low back pain - on opioids GI/Hepatic/Renal: Neg GI/Hepatic/Renal ROS            Endo/Other:    (+) : arthritis:., .                 Abdominal:           Vascular: negative vascular ROS. Anesthesia Plan      general and regional     ASA 3     (This same surgery was cancelled on 11/12/20 due to potential URI (and potential pulmonary complications due to smoking and COPD). Patient was seen by PCP and placed on antibiotics; Subsequently cleared for this surgery (see note on 12/1/20). Patient agrees to preop nerve block to help with postop pain as requested by surgeon. He also agrees to general anesthesia.)  Induction: intravenous. Anesthetic plan and risks discussed with patient. Plan discussed with CRNA.                   Paris Weber MD   12/28/2020

## 2020-12-30 ENCOUNTER — OFFICE VISIT (OUTPATIENT)
Dept: ORTHOPEDIC SURGERY | Age: 55
End: 2020-12-30

## 2020-12-30 VITALS — HEIGHT: 70 IN | TEMPERATURE: 97.1 F | BODY MASS INDEX: 27.92 KG/M2 | WEIGHT: 195 LBS

## 2020-12-30 PROCEDURE — 99024 POSTOP FOLLOW-UP VISIT: CPT | Performed by: ORTHOPAEDIC SURGERY

## 2020-12-30 NOTE — PROGRESS NOTES
Follow Up Post Operative Visit      Surgery: Right shoulder arthroscopy, subacromial decompression with acromioplasty, subscapularis repair single anchor  Date: 2020     Subjective:     Gary Flanagan. is here for follow up visit s/p above procedure. He is doing well. He has been compliant with his sling. He states that his shoulder is sore but his pain is well controlled. He denies any numbness, tingling, paresthesias to the right upper extremity. Currently no acute complaints or concerns     Controlled Substances Monitoring:        Physical Exam:     Height: 5' 10\" (1.778 m), Weight: 195 lb (88.5 kg)     General: Alert and oriented x3, no acute distress  Cardiovascular/pulmonary: No labored breathing, peripheral perfusion intact  Musculoskeletal:  Right shoulder exam  · Incisions intact, no redness/erythema/warmth/drainage noted  · Mild TTP about the shoulder, no bruising or ecchymoses noted  · Comparments soft and compressible  · +AIN/PIN/Ulnar/Median/Radial nerve function intact grossly  · +2/4 Radial pulse, Cap refill <2sec  · Distal sensation grossly intact to C4-T1 dermatomes            Imagin views right shoulder demonstrate no acute fractures or dislocations, shoulder seated in the glenoid     Assessment and Plan:   Patient 2 days status post above-mentioned procedure, progressing well. We discussed the case and showed patient images from the case, he understands the procedure was performed.   He will continue to be nonweightbearing and maintain sling immobilization in a shoulder immobilizer for the next 6 weeks at which time we will follow-up and will begin PT regiment    Electronically signed by Mac Hutchinson DO on 2020 at 11:44 AM     Staff Addendum

## 2020-12-30 NOTE — PROGRESS NOTES
Surgical dressings were removed s/p Right shoulder arthroscopy, subacromial decompression with acromioplasty, subscapularis repair single anchor on 12/28/20. Incision was cleaned with alcohol prep pads. Minimal swelling and bleeding in area. Steri stripes and Tegaderm placed over the area to cover surgical sites x 1 week.     GELA

## 2021-02-10 ENCOUNTER — OFFICE VISIT (OUTPATIENT)
Dept: ORTHOPEDIC SURGERY | Age: 56
End: 2021-02-10

## 2021-02-10 VITALS — WEIGHT: 195 LBS | TEMPERATURE: 97.7 F | BODY MASS INDEX: 27.92 KG/M2 | HEIGHT: 70 IN

## 2021-02-10 DIAGNOSIS — M75.111 INCOMPLETE TEAR OF RIGHT ROTATOR CUFF, UNSPECIFIED WHETHER TRAUMATIC: ICD-10-CM

## 2021-02-10 DIAGNOSIS — Z98.890 STATUS POST ARTHROSCOPY OF RIGHT SHOULDER: Primary | ICD-10-CM

## 2021-02-10 PROCEDURE — 99024 POSTOP FOLLOW-UP VISIT: CPT | Performed by: NURSE PRACTITIONER

## 2021-02-10 NOTE — PROGRESS NOTES
Follow Up Post Operative Visit     Surgery: Right shoulder arthroscopy, subacromial decompression with acromioplasty, subscapularis repair single anchor  Date: 12/28/2020    Subjective:    Gary Reeder. is here for follow up visit s/p above procedure. He is doing well. He has been noncompliant with restrictions. Controlled Substances Monitoring:        Physical Exam:    Height: 5' 10\" (1.778 m), Weight: 195 lb (88.5 kg)    General: Alert and oriented x3, no acute distress  Cardiovascular/pulmonary: No labored breathing, peripheral perfusion intact  Musculoskeletal:    Right shoulder exam arm at patient's side incision sites are fully mature scars. No swelling or deformity visualized. Nontender to palpation. Neurovascular sensation grossly intact. Patient actively can raise his arm 160 degrees. Imaging: No new imaging was obtained today. Postoperative shoulder x-ray was reviewed. Assessment and Plan: Status post right shoulder arthroscopy, subacromial decompression with acromioplasty, subscapularis repair single anchor      Today we discussed his right shoulder. Patient reports that after about 10 days he discontinued the use of the shoulder immobilizer. He states he has not been lifting with the operative extremity. He does state that he was applying paint tape above his head to paint his ceiling. He does not report occasional aches and pains radiating into his biceps. We discussed ideally that the first phase of the postoperative prep plan of care would be complete immobilization of the shoulder for 6 weeks. He states that sleeping with the shoulder immobilizer was uncomfortable and was making it worse so he removed it. He will be transitioned to outpatient physical therapy today. He would like to go to the [facility] physical therapy in [facility] where he did rehab in the past.  Patient was provided a postoperative protocol that he will walk into the physical therapy office.   He is to remain nonweightbearing for the next 6 weeks and focus on improving his range of motion. He will follow-up in 6 weeks. Patient verbalized understanding.       Kemar Banks, [facility]  Orthopedic Surgery   02/10/21  1:02 PM

## 2021-02-22 ENCOUNTER — HOSPITAL ENCOUNTER (OUTPATIENT)
Dept: PHYSICAL THERAPY | Age: 56
Setting detail: THERAPIES SERIES
Discharge: HOME | End: 2021-02-22
Payer: COMMERCIAL

## 2021-02-22 PROCEDURE — 97110 THERAPEUTIC EXERCISES: CPT | Performed by: PHYSICAL THERAPIST

## 2021-02-22 PROCEDURE — 97161 PT EVAL LOW COMPLEX 20 MIN: CPT | Performed by: PHYSICAL THERAPIST

## 2021-02-22 ASSESSMENT — PAIN SCALES - GENERAL: PAINLEVEL_OUTOF10: 4

## 2021-02-22 ASSESSMENT — PAIN DESCRIPTION - LOCATION: LOCATION: SHOULDER

## 2021-02-22 ASSESSMENT — PAIN DESCRIPTION - PAIN TYPE: TYPE: SURGICAL PAIN;ACUTE PAIN

## 2021-02-22 NOTE — PROGRESS NOTES
[address] [facility]                Phone: [phone]   Fax: [phone]    Physical Therapy Daily Treatment Note  Date:  2021    Patient Name:  Damaso Martini. :  1965  MRN: [id number]    Evaluating therapist:  BLANCO Fish              (21)  Restrictions/Precautions:    Diagnosis:   s/p SAD/subscap repair/acromioplasty R shoulder                       (20)  Treatment Diagnosis:    Insurance/Certification information:  [address] [place] [address]      (21)  Referring Physician:  Jack Hendricks of care signed (Y/N):    Visit# / total visits:  -  Pain level: 4/10   Time In:  Time Out:    Subjective:      Exercises:  Exercise/Equipment Resistance/Repetitions Other comments   UBE   2 min F/B            pulleys for flex/abd 3 min ea           table st:  flex 3x20s                  abd 3x20s                   ER 3x20s           pendulums   20x1lb ea              shrugs    15x3s    scap ret 15x3s                                                       Other Therapeutic Activities:      Home Exercise Program:  provided 21    Manual Treatments:      Modalities:  IFC/ICE to R shoulder PRN    Timed Code Treatment Minutes: Total Treatment Minutes:      Treatment/Activity Tolerance:  [] Patient tolerated treatment well [] Patient limited by fatique  [] Patient limited by pain  [] Patient limited by other medical complications  [] Other:     Prognosis: [] Good [] Fair  [] Poor    Patient Requires Follow-up: [] Yes  [] No    Plan:   [] Continue per plan of care [] Alter current plan (see comments)  [] Plan of care initiated [] Hold pending MD visit [] Discharge  Plan for Next Session:      See Weekly Progress Note: []  Yes  []  No  Next due:        Electronically signed by:   Harish Oneill
ROM;Decreased strength;Decreased endurance  Assessment: pain noted across ant pole of R shoulder into upper trap with all prolonged activities, 4/10  Prognosis: Good  Decision Making: Low Complexity  Activity Tolerance  Activity Tolerance: Patient Tolerated treatment well       Plan   Plan  Times per week: pt to be seen 2-3x/week/8-10 weeks  Current Treatment Recommendations: ROM, Strengthening, Endurance Training, Modalities, Home Exercise Program  Plan Comment: program will progress as per surgeon protocol    Goals  Time Frame for goals: 8-10 weeks  goal 1: decrease pain across R shoulder with all prolonged activities, 0-2/10  goal 2: restore AROM across R shoulder to WNL for all ranges  goal 3: increase strength across R shoulder/RTC to grossly 4, 4+/5 for all planes  goal 4: improve endurance for all prolonged activities to GOOD/GOOD+  goal 5: assure I with HEP for home management of condition    Patient goals : to be able to use his arm normally again        Rosangela Miranda, PT

## 2021-02-26 ENCOUNTER — HOSPITAL ENCOUNTER (OUTPATIENT)
Dept: PHYSICAL THERAPY | Age: 56
Setting detail: THERAPIES SERIES
Discharge: HOME | End: 2021-02-26
Payer: COMMERCIAL

## 2021-02-26 PROCEDURE — 97110 THERAPEUTIC EXERCISES: CPT | Performed by: PHYSICAL THERAPIST

## 2021-02-26 PROCEDURE — G0283 ELEC STIM OTHER THAN WOUND: HCPCS | Performed by: PHYSICAL THERAPIST

## 2021-02-26 NOTE — PROGRESS NOTES
[address] [facility]                Phone: [phone]   Fax: [phone]    Physical Therapy Daily Treatment Note  Date:  2021    Patient Name:  Juni Porter. :  1965  MRN: [id number]    Evaluating therapist:  BLANCO De Paz              (21)  Restrictions/Precautions:    Diagnosis:   s/p SAD/subscap repair/acromioplasty R shoulder                       (20)  Treatment Diagnosis:    Insurance/Certification information:  [address] [place] [address]      (21)  Referring Physician:  Jihan Suarez of care signed (Y/N):    Visit# / total visits:  -  Pain level: 4/10   Time In:  932  Time Out:  1038    Subjective:      Exercises:  Exercise/Equipment Resistance/Repetitions Other comments   UBE   2 min F/B            pulleys for flex/abd 3 min ea           table st:  flex 3x20s                  abd 3x20s                   ER 3x20s           pendulums   20x1lb ea              shrugs    15x3s    scap ret 15x3s                  R shoulder:  flex=  [facility] 21                         abd=  WFL                          IR= buttock                          ER= C4              Other Therapeutic Activities:      Home Exercise Program:  provided 21    Manual Treatments:      Modalities:  IFC/ICE to R shoulder x 15 min     Timed Code Treatment Minutes: Total Treatment Minutes:      Treatment/Activity Tolerance:  [] Patient tolerated treatment well [] Patient limited by fatique  [] Patient limited by pain  [] Patient limited by other medical complications  [] Other:     Prognosis: [] Good [] Fair  [] Poor    Patient Requires Follow-up: [] Yes  [] No    Plan:   [] Continue per plan of care [] Alter current plan (see comments)  [] Plan of care initiated [] Hold pending MD visit [] Discharge  Plan for Next Session:      See Weekly Progress Note: []  Yes  []  No  Next due:        Electronically signed by:   Alysha Brooks

## 2021-03-01 ENCOUNTER — HOSPITAL ENCOUNTER (OUTPATIENT)
Dept: PHYSICAL THERAPY | Age: 56
Setting detail: THERAPIES SERIES
Discharge: HOME | End: 2021-03-01
Payer: COMMERCIAL

## 2021-03-01 PROCEDURE — 97110 THERAPEUTIC EXERCISES: CPT | Performed by: PHYSICAL THERAPIST

## 2021-03-01 PROCEDURE — G0283 ELEC STIM OTHER THAN WOUND: HCPCS | Performed by: PHYSICAL THERAPIST

## 2021-03-01 NOTE — PROGRESS NOTES
[address] [facility]                Phone: [phone]   Fax: [phone]    Physical Therapy Daily Treatment Note  Date:  3/1/2021    Patient Name:  Mayford Cooks. :  1965  MRN: [id number]    Evaluating therapist:  BLANCO Hampton              (21)  Restrictions/Precautions:    Diagnosis:   s/p SAD/subscap repair/acromioplasty R shoulder                       (20)  Treatment Diagnosis:    Insurance/Certification information:  [address] [place] [address]      (21)  Referring Physician:  Dewey Bullard signed (Y/N):    Visit# / total visits:  3/18-  Pain level: 4/10   Time In:  932  Time Out:  1035    Subjective:      Exercises:  Exercise/Equipment Resistance/Repetitions Other comments   UBE   2 min F/B            pulleys for flex/abd 3 min ea           table st:  flex 3x20s                  abd 3x20s                   ER 3x20s           pendulums   20x1lb ea              shrugs    20x3s    scap ret 20x3s           supine wand flex 15x3s                chest press 2x15                 R shoulder:  flex=  [place] 21                         abd=  WFL                          IR= buttock                          ER= C4              Other Therapeutic Activities:      Home Exercise Program:  provided 21    Manual Treatments:      Modalities:  IFC/ICE to R shoulder x 15 min     Timed Code Treatment Minutes:       Total Treatment Minutes:      Treatment/Activity Tolerance:  [] Patient tolerated treatment well [] Patient limited by fatique  [] Patient limited by pain  [] Patient limited by other medical complications  [] Other:     Prognosis: [] Good [] Fair  [] Poor    Patient Requires Follow-up: [] Yes  [] No    Plan:   [] Continue per plan of care [] Alter current plan (see comments)  [] Plan of care initiated [] Hold pending MD visit [] Discharge  Plan for Next Session:      See Weekly Progress Note: []  Yes  []  No  Next due:        Electronically signed by:  Delbert Hayes

## 2021-03-05 ENCOUNTER — HOSPITAL ENCOUNTER (OUTPATIENT)
Dept: PHYSICAL THERAPY | Age: 56
Setting detail: THERAPIES SERIES
Discharge: HOME | End: 2021-03-05
Payer: COMMERCIAL

## 2021-03-05 PROCEDURE — G0283 ELEC STIM OTHER THAN WOUND: HCPCS | Performed by: PHYSICAL THERAPIST

## 2021-03-05 PROCEDURE — 97110 THERAPEUTIC EXERCISES: CPT | Performed by: PHYSICAL THERAPIST

## 2021-03-05 NOTE — PROGRESS NOTES
[address] [facility]                Phone: [phone]   Fax: [phone]    Physical Therapy Daily Treatment Note  Date:  3/5/2021    Patient Name:  Mack Arriaga. :  1965  MRN: [id number]    Evaluating therapist:  BLANCO Fernandez              (21)  Restrictions/Precautions:    Diagnosis:   s/p SAD/subscap repair/acromioplasty R shoulder                       (20)  Treatment Diagnosis:    Insurance/Certification information:  [address] [place] [address]      (21)  Referring Physician:  Charbel Merlos of care signed (Y/N):    Visit# / total visits:  -  Pain level: 4/10   Time In:  943  Time Out:  1032    Subjective:      Exercises:  Exercise/Equipment Resistance/Repetitions Other comments   UBE   3 min F/B            pulleys for flex/abd 3 min ea           table st (wedge):  flex 3x20s                                 abd 3x20s                                 ER 3x20s           pendulums   20x3lb ea              shrugs    20x3s    scap ret 20x3s           supine wand flex 15x3s                chest press 2x15                 R shoulder:  flex=  [place] 3/5/21                         abd=  WFL                          IR= L5                          ER= C5              Other Therapeutic Activities:      Home Exercise Program:  provided 21    Manual Treatments:      Modalities:  IFC/ICE to R shoulder x 15 min     Timed Code Treatment Minutes:       Total Treatment Minutes:      Treatment/Activity Tolerance:  [] Patient tolerated treatment well [] Patient limited by fatique  [] Patient limited by pain  [] Patient limited by other medical complications  [] Other:     Prognosis: [] Good [] Fair  [] Poor    Patient Requires Follow-up: [] Yes  [] No    Plan:   [] Continue per plan of care [] Alter current plan (see comments)  [] Plan of care initiated [] Hold pending MD visit [] Discharge  Plan for Next Session:      See Weekly Progress Note: []  Yes  []  No  Next due:        Electronically signed by:   Jeb Negron

## 2021-03-05 NOTE — PROGRESS NOTES
S:  pt presents to therapy for two of two scheduled visits this week; at week's end he reports that his shoulder is doing well; tells PT that it is moving well and feels less painful; pain level given as 4/10; states he is using arm as much as possible and is following restrictions; HEP going well per pt    O:  performed the exercises/treatments as written in the flowsheet for the week ending 3/5/21; initiated HEP for home management of condition; R shoulder AROM:  flex/abd= WFL/WFL, IR/ER= L5/C5; strength across R shoulder grossly 3+/5 for all ranges    A:  diego tx well; pt able to perform all requested tasks with good form and pacing noted; AROM/strength across R shoulder shows improvement across all ranges; movement is smoother with no  noted catching or clicking per pt; endurance for all prolonged activities is GOOD-    P:  cont with POC of stretching/strengthening for R shoulder with modalitiies as needed following surgeon protocol

## 2021-03-09 ENCOUNTER — HOSPITAL ENCOUNTER (OUTPATIENT)
Dept: PHYSICAL THERAPY | Age: 56
Setting detail: THERAPIES SERIES
Discharge: HOME | End: 2021-03-09
Payer: COMMERCIAL

## 2021-03-09 NOTE — PROGRESS NOTES
[address] [facility]                Phone: [phone]  Fax: [phone]    Physical Therapy  Cancellation/No-show Note  Patient Name:  Hitesh Cardenas. :  1965   Date:  3/9/2021    For today's appointment patient:  [x]  Cancelled  []  Rescheduled appointment  []  No-show     Reason given by patient:  [x]  Patient ill  []  Conflicting appointment  []  No transportation    []  Conflict with work  []  No reason given  []  Other:     Comments:      Electronically signed by:   Fabiola Parry

## 2021-03-12 ENCOUNTER — HOSPITAL ENCOUNTER (OUTPATIENT)
Dept: PHYSICAL THERAPY | Age: 56
Setting detail: THERAPIES SERIES
Discharge: HOME | End: 2021-03-12
Payer: COMMERCIAL

## 2021-03-12 PROCEDURE — G0283 ELEC STIM OTHER THAN WOUND: HCPCS | Performed by: PHYSICAL THERAPIST

## 2021-03-12 PROCEDURE — 97110 THERAPEUTIC EXERCISES: CPT | Performed by: PHYSICAL THERAPIST

## 2021-03-12 NOTE — PROGRESS NOTES
[address] [facility]                Phone: [phone]   Fax: [phone]    Physical Therapy Daily Treatment Note  Date:  3/12/2021    Patient Name:  Malathi Vigli. :  1965  MRN: [id number]    Evaluating therapist:  BLANCO Mart              (21)  Restrictions/Precautions:    Diagnosis:   s/p SAD/subscap repair/acromioplasty R shoulder                       (20)  Treatment Diagnosis:    Insurance/Certification information:  [address] [place] [address]      (21)  Referring Physician:  Marija Beatty [facility] signed (Y/N):    Visit# / total visits:  -  Pain level: 3-10   Time In:  934  Time Out:  1033    Subjective:      Exercises:  Exercise/Equipment Resistance/Repetitions Other comments   UBE   3 min F/B            pulleys for flex/abd 3 min ea           table st (wedge):  flex 3x20s                                 abd 3x20s                                 ER 3x20s           pendulums   20x3lb ea              shrugs    20x3s    scap ret 20x3s           supine wand flex [id number]                chest press [id number]                 R shoulder:  flex=  [place] [place] 3/12/21                         abd=  WFL                          IR= L5                          ER= C5              Other Therapeutic Activities:      Home Exercise Program:  provided 21    Manual Treatments:      Modalities:  IFC/ICE to R shoulder x 15 min     Timed Code Treatment Minutes:       Total Treatment Minutes:      Treatment/Activity Tolerance:  [] Patient tolerated treatment well [] Patient limited by fatique  [] Patient limited by pain  [] Patient limited by other medical complications  [] Other:     Prognosis: [] Good [] Fair  [] Poor    Patient Requires Follow-up: [] Yes  [] No    Plan:   [] Continue per plan of care [] Alter current plan (see comments)  [] Plan of care initiated [] Hold pending MD visit [] Discharge  Plan for Next Session:      See Weekly Progress Note: []  Yes  [] No  Next due:        Electronically signed by:   Barbara Hoffmann

## 2021-03-12 NOTE — PROGRESS NOTES
S:  pt presents to therapy for one of two scheduled visits this week, having cancelled on 3/8 due to illness; at week's end he reports that his shoulder continues to do well; tells PT that it is moving well and feels less painful; pain level given as 3-4/10; states he is using arm as much as possible and is following restrictions; HEP going well per pt    O:  performed the exercises/treatments as written in the flowsheet for the week ending 3/12/21;  R shoulder AROM:  flex/abd= WFL/WFL, IR/ER= L5/C5; strength across R shoulder grossly 3+/5 for all ranges    A:  diego tx well; pt able to perform all requested tasks with good form and pacing noted; AROM/strength across R shoulder stable since last week; movement is smoother with no noted catching or clicking per pt; endurance for all prolonged activities is GOOD-/GOOD    P:  cont with POC of stretching/strengthening for R shoulder with modalitiies as needed following surgeon protocol

## 2021-03-17 ENCOUNTER — HOSPITAL ENCOUNTER (OUTPATIENT)
Dept: PHYSICAL THERAPY | Age: 56
Setting detail: THERAPIES SERIES
Discharge: HOME | End: 2021-03-17
Payer: COMMERCIAL

## 2021-03-17 PROCEDURE — 97110 THERAPEUTIC EXERCISES: CPT | Performed by: PHYSICAL THERAPIST

## 2021-03-17 PROCEDURE — G0283 ELEC STIM OTHER THAN WOUND: HCPCS | Performed by: PHYSICAL THERAPIST

## 2021-03-17 NOTE — PROGRESS NOTES
[address] [facility]                Phone: [phone]   Fax: [phone]    Physical Therapy Daily Treatment Note  Date:  3/17/2021    Patient Name:  Damaso Martini. :  1965  MRN: [id number]    Evaluating therapist:  BLANCO Fish              (21)  Restrictions/Precautions:    Diagnosis:   s/p SAD/subscap repair/acromioplasty R shoulder                       (20)  Treatment Diagnosis:    Insurance/Certification information:  [address] [place] [address]      (21)  Referring Physician:  Jack Hendricks of care signed (Y/N):    Visit# / total visits:  -  Pain level: 3-4/10   Time In:  930  Time Out:  1028    Subjective:      Exercises:  Exercise/Equipment Resistance/Repetitions Other comments   UBE   3 min F/B            pulleys for flex/abd 3 min ea           table st (wedge):  flex 3x20s                                 abd 3x20s                                 ER 3x20s           pendulums   20x3lb ea              shrugs    20x3s    scap ret 20x3s           supine wand flex [id number]                chest press [id number]                 R shoulder:  flex=  [place] [place] 3/17/21                         abd=  WFL                          IR= L5                          ER= C5              Other Therapeutic Activities:      Home Exercise Program:  provided 21    Manual Treatments:      Modalities:  IFC/ICE to R shoulder x 15 min     Timed Code Treatment Minutes:       Total Treatment Minutes:      Treatment/Activity Tolerance:  [] Patient tolerated treatment well [] Patient limited by fatique  [] Patient limited by pain  [] Patient limited by other medical complications  [] Other:     Prognosis: [] Good [] Fair  [] Poor    Patient Requires Follow-up: [] Yes  [] No    Plan:   [] Continue per plan of care [] Alter current plan (see comments)  [] Plan of care initiated [] Hold pending MD visit [] Discharge  Plan for Next Session:      See Weekly Progress Note: []  Yes  [] No  Next due:        Electronically signed by:   Gonzales Burgess

## 2021-03-19 ENCOUNTER — HOSPITAL ENCOUNTER (OUTPATIENT)
Dept: PHYSICAL THERAPY | Age: 56
Setting detail: THERAPIES SERIES
Discharge: HOME | End: 2021-03-19
Payer: COMMERCIAL

## 2021-03-19 PROCEDURE — G0283 ELEC STIM OTHER THAN WOUND: HCPCS | Performed by: PHYSICAL THERAPIST

## 2021-03-19 PROCEDURE — 97110 THERAPEUTIC EXERCISES: CPT | Performed by: PHYSICAL THERAPIST

## 2021-03-22 ENCOUNTER — TREATMENT (OUTPATIENT)
Dept: PHYSICAL THERAPY | Age: 56
End: 2021-03-22
Payer: COMMERCIAL

## 2021-03-22 DIAGNOSIS — Z98.890 STATUS POST ARTHROSCOPY OF RIGHT SHOULDER: Primary | ICD-10-CM

## 2021-03-22 PROCEDURE — 97014 ELECTRIC STIMULATION THERAPY: CPT

## 2021-03-22 PROCEDURE — 97110 THERAPEUTIC EXERCISES: CPT

## 2021-03-22 NOTE — PROGRESS NOTES
[facility]  Phone: [phone]   Fax: [phone]    Physical Therapy Daily Treatment Note  Date:  3/22/2021    Patient Name:  Kristen Smith. :  1965  MRN: [id number]    Evaluating therapist:  BLANCO Blancas              (21)  Restrictions/Precautions:    Diagnosis:   s/p SAD/subscap repair/acromioplasty R shoulder                       (20)  Treatment Diagnosis:    Insurance/Certification information:  [address] [place] [address]      (21)  Referring Physician:  India Flanagan of care signed (Y/N):    Visit# / total visits:  -  Pain level: 5/10   Time In:  1402  Time Out:  1458    Subjective:  Pt reported slight increase in pain today.      Exercises:  Exercise/Equipment Resistance/Repetitions Other comments   UBE   3 min F/B            pulleys for flex/abd 3 min ea           table st (wedge):  flex 3x20s                                 abd 3x20s                                 ER 3x20s           pendulums   20x3lb ea              shrugs    20x3s    scap ret 20x3s           supine wand flex [id number]                chest press [id number]                 R shoulder:  flex=  [place] [place] 3/17/21                         abd=  WFL                          IR= L5                          ER= C5              Other Therapeutic Activities:      Home Exercise Program:  provided 21    Manual Treatments:      Modalities:  IFC/MH to R shoulder x 15 min     Timed Code Treatment Minutes:  41  Total Treatment Minutes:  56    Treatment/Activity Tolerance:  [x] Patient tolerated treatment well [] Patient limited by fatique  [] Patient limited by pain  [] Patient limited by other medical complications  [] Other:     Prognosis: [x] Good [] Fair  [] Poor    Patient Requires Follow-up: [x] Yes  [] No    Plan:   [x] Continue per plan of care [] Alter current plan (see comments)  [] Plan of care initiated [] Hold pending MD visit [] Discharge  Plan for Next Session:      See Weekly Progress Note: []  Yes  [x]  No  Next due:       Time-in Time-out Total Time   40225  Ther Ex 7129 4652 97   74461  Neuro Re-ed        86607  Ther Activities        08547  Manual Therapy       70381  E-stim  7858 7269 15               Session   56   Electronically signed by:  Florecita Gilmore PTA [id number]

## 2021-03-25 ENCOUNTER — TREATMENT (OUTPATIENT)
Dept: PHYSICAL THERAPY | Age: 56
End: 2021-03-25
Payer: COMMERCIAL

## 2021-03-25 DIAGNOSIS — Z98.890 STATUS POST ARTHROSCOPY OF RIGHT SHOULDER: Primary | ICD-10-CM

## 2021-03-25 PROCEDURE — 97110 THERAPEUTIC EXERCISES: CPT

## 2021-03-25 PROCEDURE — 97014 ELECTRIC STIMULATION THERAPY: CPT

## 2021-03-25 NOTE — PROGRESS NOTES
[facility] Out[facility] Physical Therapy  Phone: [phone]   Fax: [phone]    Physical Therapy Daily Treatment Note  Date:  3/25/2021    Patient Name:  Malathi Vigil. :  1965  MRN: [id number]    Evaluating therapist:  BLANCO Mart              (21)  Restrictions/Precautions:    Diagnosis:   s/p SAD/subscap repair/acromioplasty R shoulder                       (20)  Treatment Diagnosis:    Insurance/Certification information:  [address] [place] [address]      (21)  Referring Physician:  Marija Beatty of care signed (Y/N):    Visit# / total visits:  -  Pain level: 2-3/10   Time In:  1005  Time Out:  1100    Subjective:  Pt had nothing new to report    Exercises:  Exercise/Equipment Resistance/Repetitions Other comments   UBE   5 min F/B            pulleys for flex/abd 5 min ea           table st (wedge):  flex 3x20s                                 abd 3x20s                                 ER 3x20s           pendulums   20x3lb ea              shrugs    20x3s    scap ret 20x3s           supine wand flex [id number]                chest press [id number]                 R shoulder:  flex=  [place]/[facility] 3/17/21                         abd=  WFL                          IR= L5                          ER= C5              Other   Pt performed ex w/ good pacing and effort .      Home Exercise Program:  provided 21    Manual Treatments:  N/A    Modalities:  IFC/MH to R shoulder x 15 min     Timed Code Treatment Minutes:  40  Total Treatment Minutes:  55    Treatment/Activity Tolerance:  [x] Patient tolerated treatment well [] Patient limited by fatique  [] Patient limited by pain  [] Patient limited by other medical complications  [] Other:     Prognosis: [x] Good [] Fair  [] Poor    Patient Requires Follow-up: [x] Yes  [] No    Plan:   [x] Continue per plan of care [] Alter current plan (see comments)  [] Plan of care initiated [] Hold pending MD visit [] Discharge  Plan for Next Session: See Weekly Progress Note: []  Yes  [x]  No  Next due:       Time-in Time-out Total Time   05375  Ther Ex 9353 7178 14   74373  Neuro Re-ed        32025  Ther Activities        13684  Manual Therapy       08109  E-stim  5979 2978 15               Session 6576 3073 76   Electronically signed by:  Linh Slaughter PTA [id number]

## 2021-03-29 ENCOUNTER — OFFICE VISIT (OUTPATIENT)
Dept: ORTHOPEDIC SURGERY | Age: 56
End: 2021-03-29
Payer: COMMERCIAL

## 2021-03-29 VITALS — HEIGHT: 70 IN | WEIGHT: 195 LBS | BODY MASS INDEX: 27.92 KG/M2

## 2021-03-29 DIAGNOSIS — Z98.890 STATUS POST ARTHROSCOPY OF RIGHT SHOULDER: Primary | ICD-10-CM

## 2021-03-29 PROCEDURE — 99213 OFFICE O/P EST LOW 20 MIN: CPT | Performed by: ORTHOPAEDIC SURGERY

## 2021-03-30 ENCOUNTER — TREATMENT (OUTPATIENT)
Dept: PHYSICAL THERAPY | Age: 56
End: 2021-03-30
Payer: COMMERCIAL

## 2021-03-30 DIAGNOSIS — Z98.890 STATUS POST ARTHROSCOPY OF RIGHT SHOULDER: Primary | ICD-10-CM

## 2021-03-30 PROCEDURE — 97110 THERAPEUTIC EXERCISES: CPT | Performed by: PHYSICAL THERAPIST

## 2021-03-30 PROCEDURE — 97014 ELECTRIC STIMULATION THERAPY: CPT | Performed by: PHYSICAL THERAPIST

## 2021-03-30 NOTE — PROGRESS NOTES
[facility] Outpatient Physical Therapy  Phone: [phone]   Fax: [phone]    Physical Therapy Daily Treatment Note  Date:  3/30/2021    Patient Name:  Margy Jolly. :  1965  MRN: [id number]    Evaluating therapist:  BLANCO Thomas              (21)  Restrictions/Precautions:    Diagnosis:   s/p SAD/subscap repair/acromioplasty R shoulder                       (20)  Treatment Diagnosis:    Insurance/Certification information:  [address] [place] [address]      (21)  Referring Physician:  Gladis Pinto of care signed (Y/N):    Visit# / total visits:  10/18-  Pain level: 2-3/10   Time In:  1012  Time Out:  1110    Subjective:  Pt had nothing new to report    Exercises:  Exercise/Equipment Resistance/Repetitions Other comments   UBE   5 min F/B            pulleys for flex/abd 5 min ea           table st (wedge):  flex 3x20s                                 abd 3x20s                                 ER 3x20s           pendulums   20x 3lb ea              shrugs    20x3s    scap ret 20x3s           supine wand flex [id number]                chest press 4e91q2lb     SL  ABD R  2 x 10 reps     SL   ER  R 2 x 10 reps          Rows  GTB 2 x 10 reps     Shoulder ext  GTB 2 x 10 reps     IR/ER OTB x 10 reps                      R shoulder:  flex=  [place] 3/17/21                         abd=  WFL                          IR= L5                          ER= C5              Other   Pt performed ex w/ good pacing and effort .      Home Exercise Program:  provided 21    Manual Treatments:  N/A    Modalities:  IFC/MH to R shoulder x 15 min     Timed Code Treatment Minutes:  43  Total Treatment Minutes:  58    Treatment/Activity Tolerance:  [x] Patient tolerated treatment well [] Patient limited by fatique  [] Patient limited by pain  [] Patient limited by other medical complications  [] Other:     Prognosis: [x] Good [] Fair  [] Poor    Patient Requires Follow-up: [x] Yes  [] No    Plan:   [x] Continue per plan of care [] Alter current plan (see comments)  [] Plan of care initiated [] Hold pending MD visit [] Discharge  Plan for Next Session:      See Weekly Progress Note: []  Yes  [x]  No  Next due:       Time-in Time-out Total Time   73033  Ther Ex 4768 7949 78   41009  Neuro Re-ed        71048  Ther Activities        66265  Manual Therapy       G 0281  E-stim  8675 5930 15               Session 6762 9459 16   Electronically signed by:  Serjio Mcdaniel PTA [id number]

## 2021-04-01 ENCOUNTER — TREATMENT (OUTPATIENT)
Dept: PHYSICAL THERAPY | Age: 56
End: 2021-04-01
Payer: COMMERCIAL

## 2021-04-01 DIAGNOSIS — Z98.890 STATUS POST ARTHROSCOPY OF RIGHT SHOULDER: Primary | ICD-10-CM

## 2021-04-01 PROCEDURE — 97110 THERAPEUTIC EXERCISES: CPT | Performed by: PHYSICAL THERAPIST

## 2021-04-01 PROCEDURE — 97014 ELECTRIC STIMULATION THERAPY: CPT | Performed by: PHYSICAL THERAPIST

## 2021-04-01 NOTE — PROGRESS NOTES
Poor    Patient Requires Follow-up: [x] Yes  [] No    Plan:   [x] Continue per plan of care [] Alter current plan (see comments)  [] Plan of care initiated [] Hold pending MD visit [] Discharge  Plan for Next Session:      See Weekly Progress Note: []  Yes  [x]  No  Next due:       Time-in Time-out Total Time   03329  Ther Ex 883 217 71   72770  Neuro Re-ed        [address] [address]  Manual Therapy       G 0281  E-stim  9457 5588 15               Session 377 3240 20   Electronically signed by:  Otilio Johnson PTA [id number]

## 2021-04-06 ENCOUNTER — TREATMENT (OUTPATIENT)
Dept: PHYSICAL THERAPY | Age: 56
End: 2021-04-06
Payer: COMMERCIAL

## 2021-04-06 DIAGNOSIS — Z98.890 STATUS POST ARTHROSCOPY OF RIGHT SHOULDER: Primary | ICD-10-CM

## 2021-04-06 PROCEDURE — 97014 ELECTRIC STIMULATION THERAPY: CPT | Performed by: PHYSICAL THERAPIST

## 2021-04-06 PROCEDURE — 97110 THERAPEUTIC EXERCISES: CPT | Performed by: PHYSICAL THERAPIST

## 2021-04-06 NOTE — PROGRESS NOTES
Good [] Fair  [] Poor    Patient Requires Follow-up: [x] Yes  [] No    Plan:   [x] Continue per plan of care [] Alter current plan (see comments)  [] Plan of care initiated [] Hold pending MD visit [] Discharge  Plan for Next Session:      See Weekly Progress Note: []  Yes  [x]  No  Next due:       Time-in Time-out Total Time   98369  Ther Ex 374 2090 73   65714  Neuro Re-ed        [address]  Manual Therapy       29409  E-stim  1015 1030 15               Session 313 1030 55   Electronically signed by:  Elly Lee [facility] [id number]

## 2021-04-08 ENCOUNTER — TREATMENT (OUTPATIENT)
Dept: PHYSICAL THERAPY | Age: 56
End: 2021-04-08
Payer: COMMERCIAL

## 2021-04-08 DIAGNOSIS — Z98.890 STATUS POST ARTHROSCOPY OF RIGHT SHOULDER: Primary | ICD-10-CM

## 2021-04-08 PROCEDURE — 97110 THERAPEUTIC EXERCISES: CPT | Performed by: PHYSICAL THERAPIST

## 2021-04-08 PROCEDURE — 97014 ELECTRIC STIMULATION THERAPY: CPT | Performed by: PHYSICAL THERAPIST

## 2021-04-08 NOTE — PROGRESS NOTES
Timed Code Treatment Minutes:  48  Total Treatment Minutes:  63    Treatment/Activity Tolerance:  [x] Patient tolerated treatment well [] Patient limited by fatique  [] Patient limited by pain  [] Patient limited by other medical complications  [] Other:     Prognosis: [x] Good [] Fair  [] Poor    Patient Requires Follow-up: [x] Yes  [] No    Plan:   [x] Continue per plan of care [] Alter current plan (see comments)  [] Plan of care initiated [] Hold pending MD visit [] Discharge  Plan for Next Session:      See Weekly Progress Note: []  Yes  [x]  No  Next due:       Time-in Time-out Total Time   23852  Ther Ex 118 8996 39   19422  Neuro Re-ed        58118  Ther Activities        13484  Manual Therapy       69227  E-stim  1015 1030 15               Session 503 5332 47   Electronically signed by:  Sylwia Mandujano  Barbara Brown, PT

## 2021-04-13 ENCOUNTER — TREATMENT (OUTPATIENT)
Dept: PHYSICAL THERAPY | Age: 56
End: 2021-04-13
Payer: COMMERCIAL

## 2021-04-13 DIAGNOSIS — Z98.890 STATUS POST ARTHROSCOPY OF RIGHT SHOULDER: Primary | ICD-10-CM

## 2021-04-13 PROCEDURE — 97014 ELECTRIC STIMULATION THERAPY: CPT | Performed by: PHYSICAL THERAPIST

## 2021-04-13 PROCEDURE — 97110 THERAPEUTIC EXERCISES: CPT | Performed by: PHYSICAL THERAPIST

## 2021-04-13 NOTE — PROGRESS NOTES
[facility]  Phone: [phone]   Fax: [phone]    Physical Therapy Daily Treatment Note  Date:  2021    Patient Name:  Jimmy Worthy :  1965  MRN: [id number]    Evaluating therapist:  BLANCO Finn              (21)  Restrictions/Precautions:    Diagnosis:   s/p SAD/subscap repair/acromioplasty R shoulder                       (20)  Treatment Diagnosis:    Insurance/Certification information:  [address] [place] [address]      (21)  Referring Physician:  Maureen Fallon of care signed (Y/N):    Visit# / total visits:  -  Pain level: 3-4/10   Time In:  934  Time Out:  1030    Subjective:  Pt reported R shoulder was \" sore \" not painful. Pt reported no episodes of \" sharp\" pain in R shoulder this date. Exercises:  Exercise/Equipment Resistance/Repetitions Other comments   UBE   5 min F/B            pulleys for flex/abd 5 min ea           HEP   HEP   HEP          pendulums   20x 3lb ea              shrugs    20x3s    scap ret 20x3s           supine wand flex 2x15 3lbx3s                chest press 2x15 3lb     SL  ABD R  2 x 10 reps     SL   ER  R 2 x 10 reps          Rows  GTB 2 x 10 reps     Shoulder ext  GTB 2 x 10 reps     IR/ER OTB 2 x 10 reps                      R shoulder:  flex=  [facility] 3/17/21                         abd=  WFL                          IR= L5                          ER= C5              Other   Pt performed ex w/ good pacing and effort . Pt reported no increase in pain after session. \"just soreness . \"  Pt reported no incidence of \" sharp \" pain during therapy session     Home Exercise Program:  provided 21    Manual Treatments:  N/A    Modalities:  IFC/MH to R shoulder x 15 min     Timed Code Treatment Minutes: 41   Total Treatment Minutes:  56    Treatment/Activity Tolerance:  [x] Patient tolerated treatment well [] Patient limited by fatique  [] Patient limited by pain  [] Patient limited by other medical complications  [] Other:     Prognosis: [x] Good [] Fair  [] Poor    Patient Requires Follow-up: [x] Yes  [] No    Plan:   [x] Continue per plan of care [] Alter current plan (see comments)  [] Plan of care initiated [] Hold pending MD visit [] Discharge  Plan for Next Session:      See Weekly Progress Note: []  Yes  [x]  No  Next due:       Time-in Time-out Total Time   18706  Ther Ex 195 6750 97   92339  Neuro Re-ed        36432  Ther Activities        12957  Manual Therapy       38810  E-stim  1015 1030 15               Session 183 9044 29   Electronically signed by:  Geraldine Zeng PTA [id number]

## 2021-04-15 ENCOUNTER — TREATMENT (OUTPATIENT)
Dept: PHYSICAL THERAPY | Age: 56
End: 2021-04-15
Payer: COMMERCIAL

## 2021-04-15 DIAGNOSIS — Z98.890 STATUS POST ARTHROSCOPY OF RIGHT SHOULDER: Primary | ICD-10-CM

## 2021-04-15 PROCEDURE — 97110 THERAPEUTIC EXERCISES: CPT | Performed by: PHYSICAL THERAPIST

## 2021-04-15 PROCEDURE — 97014 ELECTRIC STIMULATION THERAPY: CPT | Performed by: PHYSICAL THERAPIST

## 2021-04-15 NOTE — PROGRESS NOTES
other medical complications  [] Other:     Prognosis: [x] Good [] Fair  [] Poor    Patient Requires Follow-up: [x] Yes  [] No    Plan:   [] Continue per plan of care [] Alter current plan (see comments)  [] Plan of care initiated [x] Hold pending MD visit [] Discharge    Plan for Next Session:  Awaiting new C-9       See Weekly Progress Note: []  Yes  [x]  No  Next due:       Time-in Time-out Total Time   73230  Ther Ex 403 1945 89   64450  Neuro Re-ed        40401  Ther Activities        94230  Manual Therapy       88176  E-stim  1015 1030 15               Session 342 5013 49   Electronically signed by:  Dionte Ewing PTA [id number]

## 2021-05-11 ENCOUNTER — TREATMENT (OUTPATIENT)
Dept: PHYSICAL THERAPY | Age: 56
End: 2021-05-11
Payer: COMMERCIAL

## 2021-05-11 DIAGNOSIS — Z98.890 STATUS POST ARTHROSCOPY OF RIGHT SHOULDER: Primary | ICD-10-CM

## 2021-05-11 PROCEDURE — 97014 ELECTRIC STIMULATION THERAPY: CPT | Performed by: PHYSICAL THERAPIST

## 2021-05-11 PROCEDURE — 97110 THERAPEUTIC EXERCISES: CPT | Performed by: PHYSICAL THERAPIST

## 2021-05-11 NOTE — PROGRESS NOTES
[facility]  Phone: [phone]   Fax: [phone]    Physical Therapy Daily Treatment Note  Date:  2021    Patient Name:  Scott Her. :  1965  MRN: [id number]    Evaluating therapist:  BLANCO Vidal              (21)  Restrictions/Precautions:    Diagnosis:   s/p SAD/subscap repair/acromioplasty R shoulder                       (20)  Treatment Diagnosis:    Insurance/Certification information:  [facility]      ( c-9  exp 21 )  Referring Physician:  Talisha Benton  santos signed (Y/N):    Visit# / total visits:  -  Pain level: 3-4/10   Time In:  935  Time Out:  1035    Subjective:  Pt reported R shoulder was \" sore \" not painful. Pt reported he sees Dr. John Orozco 21    Exercises:  Exercise/Equipment Resistance/Repetitions Other comments   UBE   5 min F/B            pulleys for flex/abd 5 min ea           HEP   HEP   HEP          pendulums   20x 3lb ea              shrugs    20x3s    scap ret 20x3s           supine wand flex 2x15 3lbx3s                chest press 2x15 3lb     SL  ABD R  2 # 2 x 10 reps     SL   ER  R 2 # 2 x 10 reps          Rows  GTB 2 x 10 reps     Shoulder ext  GTB 2 x 10 reps     IR/ER OTB 2 x 10 reps                      R shoulder:  flex=  [place] 3/17/21                         abd=  WFL                          IR= L5                          ER= C5              Other   Pt performed ex w/ good pacing and effort . Pt reported no increase in pain after session. \"just soreness . \"      Home Exercise Program:  provided 21    Manual Treatments:  N/A    Modalities:  IFC/MH to R shoulder x 15 min     Timed Code Treatment Minutes: 45  Total Treatment Minutes:  60    Treatment/Activity Tolerance:  [x] Patient tolerated treatment well [] Patient limited by fatique  [] Patient limited by pain  [] Patient limited by other medical complications  [] Other:     Prognosis: [x] Good [] Fair  [] Poor    Patient Requires Follow-up: [x] Yes  [] No    Plan:   [x] Continue per plan of care [] Alter current plan (see comments)  [] Plan of care initiated [] Hold pending MD visit [] Discharge    Plan for Next Session:  Get measurements      See Weekly Progress Note: []  Yes  [x]  No  Next due:       Time-in Time-out Total Time   86914  Ther Ex 149 3918 52   09777  Neuro Re-ed        79811  Ther Activities        15741  Manual Therapy       20174  E-stim  1020 1035 15               Session 284 8768 39   Electronically signed by:  Nicko Mcmanus PTA [id number]

## 2021-05-13 ENCOUNTER — TREATMENT (OUTPATIENT)
Dept: PHYSICAL THERAPY | Age: 56
End: 2021-05-13
Payer: COMMERCIAL

## 2021-05-13 DIAGNOSIS — Z98.890 STATUS POST ARTHROSCOPY OF RIGHT SHOULDER: Primary | ICD-10-CM

## 2021-05-13 PROCEDURE — 97110 THERAPEUTIC EXERCISES: CPT | Performed by: PHYSICAL THERAPIST

## 2021-05-13 PROCEDURE — 97014 ELECTRIC STIMULATION THERAPY: CPT | Performed by: PHYSICAL THERAPIST

## 2021-05-13 NOTE — PROGRESS NOTES
limited by other medical complications  [] Other:     Prognosis: [x] Good [] Fair  [] Poor    Patient Requires Follow-up: [x] Yes  [] No    Plan:   [x] Continue per plan of care [] Alter current plan (see comments)  [] Plan of care initiated [] Hold pending MD visit [] Discharge    Plan for Next Session:  Get measurements      See Weekly Progress Note: []  Yes  [x]  No  Next due:       Time-in Time-out Total Time   88956  Ther Ex 241 3121 42   75030  Neuro Re-ed        [address]  Manual Therapy       74133  E-stim  1020 1035 15               Session 022 8207 42   Electronically signed by:  Erica Moses PTA [id number]

## 2021-05-14 ENCOUNTER — OFFICE VISIT (OUTPATIENT)
Dept: ORTHOPEDIC SURGERY | Age: 56
End: 2021-05-14
Payer: COMMERCIAL

## 2021-05-14 VITALS — WEIGHT: 195 LBS | BODY MASS INDEX: 27.92 KG/M2 | HEIGHT: 70 IN

## 2021-05-14 DIAGNOSIS — S46.211A RUPTURE OF RIGHT PROXIMAL BICEPS TENDON, INITIAL ENCOUNTER: ICD-10-CM

## 2021-05-14 DIAGNOSIS — Z98.890 STATUS POST ARTHROSCOPY OF RIGHT SHOULDER: Primary | ICD-10-CM

## 2021-05-14 DIAGNOSIS — M75.111 INCOMPLETE TEAR OF RIGHT ROTATOR CUFF, UNSPECIFIED WHETHER TRAUMATIC: ICD-10-CM

## 2021-05-14 PROCEDURE — 99213 OFFICE O/P EST LOW 20 MIN: CPT | Performed by: ORTHOPAEDIC SURGERY

## 2021-05-14 NOTE — PROGRESS NOTES
Follow Up Post Operative Visit     Surgery: Right shoulder arthroscopy, subacromial decompression with acromioplasty, subscapularis repair single anchor  Date: 12/28/2020       Subjective:    Gary Brianna Hair. is here for follow up visit s/p above procedure. He is doing well. He has been back to normal activities. Has only occasional stiffness and pain    Controlled Substances Monitoring:        Physical Exam:    Height: 5' 10\" (1.778 m), Weight: 195 lb (88.5 kg)    General: Alert and oriented x3, no acute distress  Cardiovascular/pulmonary: No labored breathing, peripheral perfusion intact  Musculoskeletal:    Exam of the shoulder shows range of motion 160/45/L1. Belly press test is intact but mild restricted motion due to stiffness. Chronic biceps Erick deformity noted. No tenderness over the anterior shoulder today     Imaging: no new images. Previous images reviewed      Assessment and Plan: He is almost 5 months out from right shoulder subscapularis repair  He is doing well. He will continue to progress with strengthening and continue to work on range of motion mainly internal rotation. We will see him back in 3 months to reassess at what will likely be his final visit.     Angely Smith MD  Orthopaedic Surgery   5/14/21  9:18 AM

## 2021-05-25 ENCOUNTER — TREATMENT (OUTPATIENT)
Dept: PHYSICAL THERAPY | Age: 56
End: 2021-05-25
Payer: COMMERCIAL

## 2021-05-25 DIAGNOSIS — Z98.890 STATUS POST ARTHROSCOPY OF RIGHT SHOULDER: Primary | ICD-10-CM

## 2021-05-25 PROCEDURE — 97110 THERAPEUTIC EXERCISES: CPT | Performed by: PHYSICAL THERAPIST

## 2021-05-25 NOTE — PROGRESS NOTES
[facility]  Phone: [phone]   Fax: [phone]    Physical Therapy Daily Treatment Note  Date:  2021    Patient Name:  Princess Corey. :  1965  MRN: [id number]    Evaluating therapist:  BLANCO Garza              (21)  Restrictions/Precautions:    Diagnosis:   s/p SAD/subscap repair/acromioplasty R shoulder                       (20)  Treatment Diagnosis:    Insurance/Certification information:  [facility]      ( c-9  exp 21 )  Referring Physician:  Margaret Cardoso  care signed (Y/N):    Visit# / total visits:  -  Pain level: 2/10   Time In:  927  Time Out:  1010    Subjective:  Pt reported R shoulder was \" sore \" not painful. 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 Pt reported he sees Dr. Meenu May 21    Exercises:  Exercise/Equipment Resistance/Repetitions Other comments   UBE   5 min F/B            pulleys for flex/abd 5 min ea              pendulums   20x 3lb ea  PRN                 supine wand flex 2x15 3lbx3s                chest press 2x15 3lb     SL  ABD R  2 # 2 x 10 reps     SL   ER  R 2 # 2 x 10 reps          Rows  GTB 2 x 10 reps     Shoulder ext  GTB 2 x 10 reps     IR/ER GTB 2 x 10 reps          IR stretch  15 sec x 5 reps          Bent sh flex, ABD, ext 2 #  X 15 reps each                3/17/21 5/13/21    R shoulder:  flex=  °                         abd=  °                         IR= L5 45°                         ER= C5 85°             Other   Pt performed ex w/ good pacing and effort . Pt reported no increase in pain after session. \"just soreness . \"  Tolerated progressions     Home Exercise Program:  provided 21    Manual Treatments:  N/A    Modalities:  IFC/MH to R shoulder x 15 min ( declined today d/t time constraints )     Timed Code Treatment Minutes: 43  Total Treatment Minutes:  43    Treatment/Activity Tolerance:  [x] Patient

## 2021-05-27 ENCOUNTER — TREATMENT (OUTPATIENT)
Dept: PHYSICAL THERAPY | Age: 56
End: 2021-05-27
Payer: COMMERCIAL

## 2021-05-27 DIAGNOSIS — Z98.890 STATUS POST ARTHROSCOPY OF RIGHT SHOULDER: Primary | ICD-10-CM

## 2021-05-27 PROCEDURE — 97110 THERAPEUTIC EXERCISES: CPT | Performed by: PHYSICAL THERAPIST

## 2021-05-27 NOTE — PROGRESS NOTES
[facility] Therapy  Phone: [phone]   Fax: [phone]    Physical Therapy Daily Treatment Note  Date:  2021    Patient Name:  Fabrizio Black. :  1965  MRN: [id number]    Evaluating therapist:  BLANCO Harmon              (21)  Restrictions/Precautions:    Diagnosis:   s/p SAD/subscap repair/acromioplasty R shoulder                       (20)  Treatment Diagnosis:    Insurance/Certification information:  [facility]      ( c-9  exp 21 )  Referring Physician:  Yoselin Biswas of care signed (Y/N):    Visit# / total visits:  -  Pain level: 1/10   Time In:  939  Time Out:  1018    Subjective:  Pt reported R shoulder was \" sore \" not painful. Exercises:  Exercise/Equipment Resistance/Repetitions Other comments   UBE   5 min F/B        pendulums   20x 3lb ea  PRN                 supine wand flex 2x15 3lbx3s                chest press 2x15 3lb     SL  ABD R  3 # 2 x 10 reps     SL   ER  R 3 # 2 x 10 reps          Rows  GTB 2 x 10 reps     Shoulder ext  GTB 2 x 10 reps     IR/ER GTB 2 x 10 reps     Horizontal ABD GTB x 10 reps     IR stretch  15 sec x 5 reps          Bent sh flex, ABD, ext 2 #  X 15 reps each         Cybex      Rows  TBA                    diagonals TBA                    Punch outs TBA           3/17/21 5/13/21    R shoulder:  flex=  °                         abd=  °                         IR= L5 45°                         ER= C5 85°             Other   Pt performed ex w/ good pacing and effort . Pt reported no increase in pain after session. \"just soreness . \"  Tolerated progressions     Home Exercise Program:  provided 21    Manual Treatments:  N/A    Modalities:  IFC/MH to R shoulder x 15 min ( declined today d/t time constraints )     Timed Code Treatment Minutes: 39  Total Treatment Minutes:  39    Treatment/Activity Tolerance:  [x] Patient tolerated treatment well [] Patient limited by ricki  [] Patient limited by pain  [] Patient limited by other medical complications  [] Other:     Prognosis: [x] Good [] Fair  [] Poor    Patient Requires Follow-up: [x] Yes  [] No    Plan:   [x] Continue per plan of care [] Alter current plan (see comments)  [] Plan of care initiated [] Hold pending MD visit [] Discharge    Plan for Next Session:  Get measurements      See Weekly Progress Note: []  Yes  [x]  No  Next due:       Time-in Time-out Total Time   36898  Ther Ex 022 1976 11   64034  Neuro Re-ed        [address] [place] [address]  Ther Activities        69560  Manual Therapy       05413  E-stim                   Session 989 4912 39   Electronically signed by:  Armando Shah PTA [id number]

## 2021-06-01 ENCOUNTER — TREATMENT (OUTPATIENT)
Dept: PHYSICAL THERAPY | Age: 56
End: 2021-06-01
Payer: COMMERCIAL

## 2021-06-01 DIAGNOSIS — Z98.890 STATUS POST ARTHROSCOPY OF RIGHT SHOULDER: Primary | ICD-10-CM

## 2021-06-01 PROCEDURE — 97110 THERAPEUTIC EXERCISES: CPT | Performed by: PHYSICAL THERAPIST

## 2021-06-03 ENCOUNTER — TREATMENT (OUTPATIENT)
Dept: PHYSICAL THERAPY | Age: 56
End: 2021-06-03
Payer: COMMERCIAL

## 2021-06-03 DIAGNOSIS — Z98.890 STATUS POST ARTHROSCOPY OF RIGHT SHOULDER: Primary | ICD-10-CM

## 2021-06-03 PROCEDURE — 97110 THERAPEUTIC EXERCISES: CPT | Performed by: PHYSICAL THERAPIST

## 2021-06-08 ENCOUNTER — TREATMENT (OUTPATIENT)
Dept: PHYSICAL THERAPY | Age: 56
End: 2021-06-08
Payer: COMMERCIAL

## 2021-06-08 DIAGNOSIS — Z98.890 STATUS POST ARTHROSCOPY OF RIGHT SHOULDER: Primary | ICD-10-CM

## 2021-06-08 PROCEDURE — 97110 THERAPEUTIC EXERCISES: CPT | Performed by: PHYSICAL THERAPIST

## 2021-06-08 NOTE — PROGRESS NOTES
[facility]  Phone: [phone]   Fax: [phone]    Physical Therapy Daily Treatment Note  Date:  2021    Patient Name:  Fabrizio Black. :  1965  MRN: [id number]    Evaluating therapist:  BLANCO Harmon              (21)  Restrictions/Precautions:    Diagnosis:   s/p SAD/subscap repair/acromioplasty R shoulder                       (20)  Treatment Diagnosis:    Insurance/Certification information:  [facility]      ( c-9  exp 21 )  Referring Physician:  Yoselin Biswas of care signed (Y/N):    Visit# / total visits:  -  Pain level: 3-4/10   Time In:  930  Time Out:  1007    Subjective:  Pt reported R shoulder was \" sore \" not painful. Exercises:  Exercise/Equipment Resistance/Repetitions Other comments   UBE   5 min F/B        pendulums   20x 3lb ea  PRN                       SL  ABD R  3 # 2 x 10 reps     SL   ER  R 3 # 2 x 10 reps          Rows  BTB 2 x 10 reps  6/8 HEP   Shoulder ext  BTB 2 x 10 reps  6/8 HEP   IR/ER BTB 2 x 10 reps  6/8 HEP   Horizontal ABD GTB x 10 reps  6/8 HEP   IR stretch  15 sec x 5 reps  6/8 HEP        Bent sh flex, ABD, ext 3 #  X 15 reps each /8 HEP        Front/lateral raises to 90° 3 # x 15 reps each /8 HEP        Cybex      Rows  1.5 plates x 10 reps                     diagonals 1.5 plates x 10 reps                     Punch outs 1.5 plates x 10 reps                     Sh ext  1.5 plates x 10 reps            3/17/21 5/13/21    R shoulder:  flex=  °                         abd=  °                         IR= L5 45°                         ER= C5 85°             Other   Pt performed ex w/ good pacing and effort . Pt reported no increase in pain after session. \"just soreness . \"  Tolerated progressions , reported fatigue in R shoulder. Home Exercise Program:  provided 21.  : added above to HEP, handout and TB provided to pt.  Pt demonstrated understanding to therapist    Manual Treatments:  N/A    Modalities:  IFC/MH to R shoulder x 15 min ( declined today d/t time constraints )     Timed Code Treatment Minutes: 40  Total Treatment Minutes:  40    Treatment/Activity Tolerance:  [x] Patient tolerated treatment well [] Patient limited by fatique  [] Patient limited by pain  [] Patient limited by other medical complications  [] Other:     Prognosis: [x] Good [] Fair  [] Poor    Patient Requires Follow-up: [x] Yes  [] No    Plan:   [x] Continue per plan of care [] Alter current plan (see comments)  [] Plan of care initiated [] Hold pending MD visit [] Discharge    Plan for Next Session:  Get measurements      See Weekly Progress Note: []  Yes  [x]  No  Next due:       Time-in Time-out Total Time   34476  Ther Ex 228 0206 15   42144  Neuro Re-ed        83311  Ther Activities        98513  Manual Therapy       28401  E-stim                   Session 990 6185 37   Electronically signed by:  Otilio Johnson PTA [id number]

## 2021-06-10 NOTE — PROGRESS NOTES
[facility]                Phone: [phone] Fax: [phone]    Physical Therapy  Outpatient Discharge Summary     Date:  6/10/2021    Patient Name:  Melanie Garcia. :  1965  MRN: [id number]    DIAGNOSIS:     Diagnosis Orders   1. Status post arthroscopy of right shoulder       REFERRING PHYSICIAN:  Rajni Temple MD    ATTENDANCE:  Pt has attended 22 of 22 scheduled treatments from 21 to 21. TREATMENTS RECEIVED:  ROM/stretching, strength training, endurance training, education in a HEP    INITIAL STATUS:  · Pain right shoulder 4/5  · AROM: flex 120°, abd 110°, IR L5, ER back of head  · Strength: grossly 3- to 3/5 throughout right shoulder  · Endurance: FAIR/FAIR+    FINAL STATUS:  · Pain 3-4/10   · AROM WFL throughout  · Strength 4+ to 5/5 throughout right shoulder  · Endurance: GOOD  · Pt is independent with HEP    GOALS:  4 out of 5 Long Term Goals were obtained. LONG TERM GOALS NOT OBTAINED/REASON:  All goals met except pt pain level not significantly changed since evaluation. PATIENT GOALS:  Return to work/prior activities    REASON FOR DISCHARGE:  Pt has met most goals and received maximum benefit from PT. PATIENT EDUCATION/INSTRUCTIONS:  Pt provided with a HEP of stretching and strength training activities. RECOMMENDATIONS:  Discharge to HEP. Thank you for the opportunity to work with your patient. If you have questions or comments, please feel free to contact me by phone or fax.       Electronically Signed by: Tim Mckeon, [id number]  6/10/2021

## 2021-08-03 ENCOUNTER — HOSPITAL ENCOUNTER (OUTPATIENT)
Age: 56
Discharge: HOME | End: 2021-08-03
Payer: COMMERCIAL

## 2021-08-03 DIAGNOSIS — G89.29 CHRONIC MIDLINE LOW BACK PAIN WITHOUT SCIATICA: ICD-10-CM

## 2021-08-03 DIAGNOSIS — M54.50 CHRONIC MIDLINE LOW BACK PAIN WITHOUT SCIATICA: ICD-10-CM

## 2021-08-03 DIAGNOSIS — Z00.00 ROUTINE GENERAL MEDICAL EXAMINATION AT A HEALTH CARE FACILITY: ICD-10-CM

## 2021-08-03 LAB
ALBUMIN SERPL-MCNC: 4.1 G/DL (ref 3.5–5.2)
ALP BLD-CCNC: 59 U/L (ref 40–129)
ALT SERPL-CCNC: 39 U/L (ref 0–40)
AMPHETAMINE SCREEN, URINE: NOT DETECTED
ANION GAP SERPL CALCULATED.3IONS-SCNC: 8 MMOL/L (ref 7–16)
AST SERPL-CCNC: 27 U/L (ref 0–39)
BARBITURATE SCREEN URINE: NOT DETECTED
BENZODIAZEPINE SCREEN, URINE: NOT DETECTED
BILIRUB SERPL-MCNC: 0.5 MG/DL (ref 0–1.2)
BUN BLDV-MCNC: 17 MG/DL (ref 6–20)
CALCIUM SERPL-MCNC: 9.7 MG/DL (ref 8.6–10.2)
CANNABINOID SCREEN URINE: NOT DETECTED
CHLORIDE BLD-SCNC: 102 MMOL/L (ref 98–107)
CHOLESTEROL, TOTAL: 180 MG/DL (ref 0–199)
CO2: 26 MMOL/L (ref 22–29)
COCAINE METABOLITE SCREEN URINE: NOT DETECTED
CREAT SERPL-MCNC: 0.9 MG/DL (ref 0.7–1.2)
FENTANYL SCREEN, URINE: NOT DETECTED
GFR AFRICAN AMERICAN: >60
GFR NON-AFRICAN AMERICAN: >60 ML/MIN/1.73
GLUCOSE BLD-MCNC: 98 MG/DL (ref 74–99)
HCT VFR BLD CALC: 47.9 % (ref 37–54)
HDLC SERPL-MCNC: 29 MG/DL
HEMOGLOBIN: 16.4 G/DL (ref 12.5–16.5)
LDL CHOLESTEROL CALCULATED: 111 MG/DL (ref 0–99)
Lab: ABNORMAL
MCH RBC QN AUTO: 29.4 PG (ref 26–35)
MCHC RBC AUTO-ENTMCNC: 34.2 % (ref 32–34.5)
MCV RBC AUTO: 86 FL (ref 80–99.9)
METHADONE SCREEN, URINE: NOT DETECTED
OPIATE SCREEN URINE: POSITIVE
OXYCODONE URINE: NOT DETECTED
PDW BLD-RTO: 13.4 FL (ref 11.5–15)
PHENCYCLIDINE SCREEN URINE: NOT DETECTED
PLATELET # BLD: 258 E9/L (ref 130–450)
PMV BLD AUTO: 9.4 FL (ref 7–12)
POTASSIUM SERPL-SCNC: 4.1 MMOL/L (ref 3.5–5)
RBC # BLD: 5.57 E12/L (ref 3.8–5.8)
SODIUM BLD-SCNC: 136 MMOL/L (ref 132–146)
TOTAL PROTEIN: 7.4 G/DL (ref 6.4–8.3)
TRIGL SERPL-MCNC: 198 MG/DL (ref 0–149)
VLDLC SERPL CALC-MCNC: 40 MG/DL
WBC # BLD: 10.9 E9/L (ref 4.5–11.5)

## 2021-08-03 PROCEDURE — 80307 DRUG TEST PRSMV CHEM ANLYZR: CPT

## 2021-08-03 PROCEDURE — 84153 ASSAY OF PSA TOTAL: CPT

## 2021-08-03 PROCEDURE — 84154 ASSAY OF PSA FREE: CPT

## 2021-08-03 PROCEDURE — 85027 COMPLETE CBC AUTOMATED: CPT

## 2021-08-03 PROCEDURE — 80061 LIPID PANEL: CPT

## 2021-08-03 PROCEDURE — 36415 COLL VENOUS BLD VENIPUNCTURE: CPT

## 2021-08-03 PROCEDURE — 80053 COMPREHEN METABOLIC PANEL: CPT

## 2021-08-06 LAB
PROSTATE SPECIFIC ANTIGEN FREE: 0.3 NG/ML
PROSTATE SPECIFIC ANTIGEN PERCENT FREE: 23 %
PROSTATE SPECIFIC ANTIGEN: 1.3 NG/ML (ref 0–4)

## 2021-08-20 ENCOUNTER — OFFICE VISIT (OUTPATIENT)
Dept: ORTHOPEDIC SURGERY | Age: 56
End: 2021-08-20
Payer: COMMERCIAL

## 2021-08-20 VITALS — RESPIRATION RATE: 14 BRPM | BODY MASS INDEX: 29.49 KG/M2 | WEIGHT: 206 LBS | HEIGHT: 70 IN

## 2021-08-20 DIAGNOSIS — Z98.890 STATUS POST ARTHROSCOPY OF RIGHT SHOULDER: Primary | ICD-10-CM

## 2021-08-20 DIAGNOSIS — M75.111 INCOMPLETE TEAR OF RIGHT ROTATOR CUFF, UNSPECIFIED WHETHER TRAUMATIC: ICD-10-CM

## 2021-08-20 PROCEDURE — 99213 OFFICE O/P EST LOW 20 MIN: CPT | Performed by: ORTHOPAEDIC SURGERY

## 2021-08-20 NOTE — PROGRESS NOTES
Follow Up Visit     Sd Nugent. returns today for follow up visit regarding right shoulder arthroscopy, subacromial decompression with acromioplasty, subscapularis repair single anchor. Treatment thus far has included Surgery  with good improvement. He reports symptoms are improved. REVIEW OF SYSTEMS:     Constitutional:  Negative for weight loss, fevers, chills, fatigue  Cardiovascular: Negative for chest pain, palpitations  Pulmonary: Negative for shortness of breath, labored breathing, cough  GI: negative for abdominal pain, nausea, vomitting   MSK: per HPI  Skin: negative for rash, open wounds    All other systems reviewed and are negative       Physical Exam:     No data recorded    General: Alert and oriented x3, no acute distress  Cardiovascular/pulmonary: No labored breathing, peripheral perfusion intact  Musculoskeletal:    Exam of the shoulder shows range of motion 160/45/L1. There is good range of motion with belly press test as well as good strength. Good strength with Jass test.    Controlled Substances Monitoring:      Imaging:  No new images. Previous images reviewed      Assessment: He is about 8 months out from right shoulder rotator cuff repair      Plan:   He is doing well. He would like to do some more physical therapy to progress with strength as he still feels he has not quite regained his full strength. I think this is reasonable at this point. Prescription was given. We will see him back for final visit at the 1 year ysabel.     Ulisses Malone MD  Orthopaedic Surgery   8/20/21  9:03 AM

## 2021-09-24 ENCOUNTER — EVALUATION (OUTPATIENT)
Dept: PHYSICAL THERAPY | Age: 56
End: 2021-09-24
Payer: COMMERCIAL

## 2021-09-24 DIAGNOSIS — S46.111A RUPTURE LONG HEAD BICEPS TENDON, RIGHT, INITIAL ENCOUNTER: Primary | ICD-10-CM

## 2021-09-24 DIAGNOSIS — S46.011A STRAIN OF TENDON OF RIGHT ROTATOR CUFF, INITIAL ENCOUNTER: ICD-10-CM

## 2021-09-24 PROCEDURE — 97110 THERAPEUTIC EXERCISES: CPT | Performed by: PHYSICAL THERAPIST

## 2021-09-24 PROCEDURE — 97161 PT EVAL LOW COMPLEX 20 MIN: CPT | Performed by: PHYSICAL THERAPIST

## 2021-09-24 NOTE — PROGRESS NOTES
[facility]          Phone: [phone] Fax: [phone]    Physical Therapy Daily Treatment Note  Date:  2021    Patient Name:  Monica Robledo. :  1965  MRN: [id number]    Restrictions/Precautions:    Diagnosis:     Diagnosis Orders   1. Rupture long head biceps tendon, right, initial encounter     2.  Strain of tendon of right rotator cuff, initial encounter       Treatment Diagnosis:    Insurance/Certification information:  [facility]  Referring Physician:  Jaqueline Mcgraw MD  Plan of care signed (Y/N):    Visit# / total visits:    (6-18 visits approved through 10/13/21)  Pain level: 0-1/10   Time In:  0805  Time Out:  08    Subjective:  See evaluation    Exercises:  Exercise/Equipment Resistance/Repetitions Other comments     UBE 3 min fwd/3 min bwd Level 5     Cybex D1/D2 diagonals 1 pl x 15 reps each, 4 directions      Cybex single arm punch-outs 1 pl x 15 reps      Cybex single arm rows 1 pl x 15 reps      Cybex upright rows       Cybex bicep curls       Cybex triceps ext        Cybex shoulder ext       Overhead press (Cybex vs dumbbells)         Box lifts                                                                       Other Therapeutic Activities:  PT evaluation completed    Home Exercise Program:  N/A    Manual Treatments:  N/A    Modalities:  N/A     Time-in Time-out Total Time   85570  Evaluation Low Complexity 0805 0828 23   09250  Evaluation Med Complexity      77550  Evaluation High Complexity      69953  Ther Ex 0828 0845 17   40443  Neuro Re-ed        56627  Ther Activities        03707  Manual Therapy       48556  E-stim       14321  Ultrasound            Session 0805 0845 40       Treatment/Activity Tolerance:  [x] Patient tolerated treatment well [] Patient limited by fatigue  [] Patient limited by pain  [] Patient limited by other medical complications  [] Other:     Prognosis: [x] Good [] Fair  [] Poor    Patient Requires Follow-up: [x] Yes  [] No    Plan:   [] Continue per plan of care [] Alter current plan (see comments)  [x] Plan of care initiated [] Hold pending MD visit [] Discharge  Plan for Next Session:        Electronically signed by:  Tim Mckeon, [id number]

## 2021-09-24 NOTE — PROGRESS NOTES
THORACOSCOPY Right 8/30/2019    BRONCH, ROBOTIC RIGHT VIDEO ASSISTED THORACOSCOPY, UPPER LOBE WEDGE RESECTION, POSSIBLE LOBECTOMY performed by Fior Waldrop MD at [address] [place] [address]       Medications:   Current Outpatient Medications   Medication Sig Dispense Refill    albuterol sulfate  (90 Base) MCG/ACT inhaler Inhale 2 puffs into the lungs 4 times daily as needed for Wheezing 3 Inhaler 3    pravastatin (PRAVACHOL) 40 MG tablet Take 1 tablet by mouth daily 30 tablet 5    INCRUSE ELLIPTA 62.5 MCG/INH AEPB INHALE 1 PUFF INTO THE LUNGS DAILY 7 each 13    albuterol sulfate HFA (VENTOLIN HFA) 108 (90 Base) MCG/ACT inhaler Inhale 2 puffs into the lungs every 4-6 hours as needed for Wheezing 1 Inhaler 1    acetaminophen (TYLENOL) 325 MG tablet Take 650 mg by mouth every 6 hours as needed for Pain      loratadine (CLARITIN) 10 MG capsule Take 10 mg by mouth daily as needed       No current facility-administered medications for this visit. Occupation: , off work since injury. Physical demands include: lifting, carrying, pushing, pulling heavy weighted items (50 - 100 lbs Occasionally), operating hand and arm controls, tool use. Status: off work since injury.     Exercise regimen: resistence training with bands    Hobbies: none    Patient Goals: full use of arm, return to work    Precautions/Contraindications: none    OBJECTIVE:     Observations: well nourished male    Inspection: normal orthopedic exam.                    Palpation: No tenderness     Joint/Motion:  Right Shoulder:  AROM: WFL Forward elevation,  WFL abduction, WFL ER,  Grossly 60° IR      Left Shoulder:  AROM: WFL throughout    Strength:  Right Shoulder: Flexion 4/5,  Abduction 4/5, ER 4/5, IR 4/5    Right Elbow:Flexion 5/5,  Extension 5/5    Left Shoulder: Flexion 5/5,  Abduction 5/5, ER 5/5, IR 5/5   Left Elbow:Flexion 5/5,  Extension 5/5    Special Tests/Functional Screens:    [] Ashley []+ / [] -  [] Oconto's []+ / [] -   [] AC Sheer []+ / [] -    [] [address] drawer []+ / [] -    [] Bicep Load []+ / [] -   [] Crank []+ / [] -  [] Clunk []+ / [] -  [] Hurshel Mule []+ / [] -   [] Glennda Liter []+ / [] -  [] Neer's []+ / [] -      [] Speed's []+ / [] -   [] Yerganson's []+ / [] -    [] Sulcus Sign []+ / [] -   [] Apprehension []+ / [] -   [] Bicep Load II []+ / [] -   [] Elbow Valgus []+ / [] -     [] Elbow Varus []+ / [] -   [] Jass's relocation []+ / [] -   [] Empty Can []+ / [] -  [] Drop arm []+ / [] -  [] ER lag []+ / [] -  [] Painful Arc []+ / [] -  [] Dolly Fast []+ / [] -  [] Belly Press/ lift off[]+ / [] -  [] Other: []+ / [] -       Special Test Comments:  N/A    ASSESSMENT     Outcome Measure:   QuickDASH (Disorders of the Arm, Shoulder, and Hand) 20.5% disability    Problems:    Strength decreased   Decreased functional ability with use of right upper extremity, work activities    Reason for Skilled Care: pt requires physical therapy for strength training and to prepare pt for return to work    [x] There are no barriers affecting plan of care or recovery    [] Barriers to this patient's plan of care or recovery include.     Domestic Concerns:  [x] No  [] Yes:      Long Term goals (6 weeks)   Increase Strength to 4+ to 5/5     QuickDASH 10% disability   Independent with Home Exercise Programs    Rehab Potential: [x] Good  [] Fair  [] Poor    PLAN       Treatment Plan:   [x] Therapeutic Exercise  [x] Therapeutic Activity  [x] Neuromuscular Re-education   [] Gait Training  [] Balance Training  [] Aerobic conditioning  [] Manual Therapy  [] Massage/Fascial release   [x] Work/Sport specific activities    [] Pain Neuroscience [] Cold/hotpack  [] Vasocompression  [] Electrical Stimulation  [] Lumbar/Cervical Traction  [] Ultrasound   [] Iontophoresis: 4 mg/mL Dexamethasone Sodium Phosphate 40-80 mAmin  [] Dry Needling      [x] Instruction in HEP      []  Medication allergies reviewed for use of Dexamethasone Sodium Phosphate 4mg/ml  with iontophoresis treatments. Patient is not allergic. The following CPT codes are likely to be used in the care of this patient: 64000 PT Evaluation: Low Complexity, 09162 PT Re-Evaluation, 21913 Therapeutic Exercise, 84431 Neuromuscular Re-Education and 81293 Therapeutic Activities      Suggested Professional Referral: [x] No  [] Yes:     Patient Education:  [x] Plans/Goals, Risks/Benefits discussed  [x] Home exercise program  Method of Education: [x] Verbal  [x] Demo  [x] Written  Comprehension of Education:  [x] Verbalizes understanding. [x] Demonstrates understanding. [] Needs Review. [] Demonstrates/verbalizes understanding of HEP/Ed previously given. Frequency:  2 days per week for 6 weeks    Patient understands diagnosis/prognosis and consents to treatment, plan and goals: [x] Yes    [] No     Thank you for the opportunity to work with your patient. If you have questions or comments, please contact me at numbers listed above. Electronically signed by: Jasmyn Kirkland, [address] [place] [address], [id number] Medicare Patients Only     Please sign Physician's Certification and return to: Johnnie Bernstein  PHYSICAL THERAPY  [address] [facility] [address] [facility] [phone]  Dept: [phone]  Dept Fax: [phone] Certification / Comments     Frequency/Duration 2 days per week for 6 weeks. Certification period from 9/24/2021  to 12/23/21. I have reviewed the Plan of Care established for skilled therapy services and certify that the services are required and that they will be provided while the patient is under my care.     Physician's Comments/Revisions:               Physician's Printed Name:                                           [de-identified] Signature:                                                               Date:

## 2021-09-27 ENCOUNTER — TREATMENT (OUTPATIENT)
Dept: PHYSICAL THERAPY | Age: 56
End: 2021-09-27
Payer: COMMERCIAL

## 2021-09-27 DIAGNOSIS — S46.111A RUPTURE LONG HEAD BICEPS TENDON, RIGHT, INITIAL ENCOUNTER: Primary | ICD-10-CM

## 2021-09-27 DIAGNOSIS — Z98.890 STATUS POST ARTHROSCOPY OF RIGHT SHOULDER: ICD-10-CM

## 2021-09-27 DIAGNOSIS — S46.011A STRAIN OF TENDON OF RIGHT ROTATOR CUFF, INITIAL ENCOUNTER: ICD-10-CM

## 2021-09-27 PROCEDURE — 97110 THERAPEUTIC EXERCISES: CPT

## 2021-09-27 NOTE — PROGRESS NOTES
[facility] Outpatient Physical Therapy          Phone: [phone] Fax: [phone]    Physical Therapy Daily Treatment Note  Date:  2021    Patient Name:  Sd Nugent. :  1965  MRN: [id number]    Restrictions/Precautions:    Diagnosis:     Diagnosis Orders   1. Rupture long head biceps tendon, right, initial encounter     2. Strain of tendon of right rotator cuff, initial encounter     3.  Status post arthroscopy of right shoulder       Treatment Diagnosis:    Insurance/Certification information:  [facility]  Referring Physician:  Ulisses Malone MD  Plan of care signed (Y/N):    Visit# / total visits:    (6-18 visits approved through 10/13/21)  Pain level: 0-1/10   Time In:  0843  Time Out:  915    Subjective:  Pt reported no pain, stated he had residual soreness in R shoulder 24-36 hours after last session    Exercises:  Exercise/Equipment Resistance/Repetitions Other comments     UBE 4 min fwd/4 min bwd Level 6 ( pt set level)      Cybex D1/D2 diagonals 1 pl x 15 reps each, 4 directions      Cybex single arm punch-outs 1 pl x 15 reps      Cybex single arm rows 1 pl x 15 reps      Cybex upright rows 1 pl x 15 reps      Cybex bicep curls 1 pl x 15 reps      Cybex triceps ext  1 pl x 15 reps      Cybex shoulder ext 1 pl x 15 reps      Overhead press (Cybex vs dumbbells) 3# x 15 reps B         Box lifts 10 # x 15 reps B                                                                      Other   Pt tolerated program without c/o pain     Home Exercise Program:  N/A    Manual Treatments:  N/A    Modalities:  N/A     Time-in Time-out Total Time   63190  Evaluation Low Complexity      41120  Evaluation Med Complexity      65422  Evaluation High Complexity      40785  Ther Ex [phone] 31 [phone]  Neuro Re-ed        59119  Ther Activities        97802  Manual Therapy       06931  E-stim       84979  Ultrasound            Session 43 0915 32       Treatment/Activity Tolerance:  [x] Patient tolerated treatment well [] Patient limited by fatigue  [] Patient limited by pain  [] Patient limited by other medical complications  [] Other:     Prognosis: [x] Good [] Fair  [] Poor    Patient Requires Follow-up: [x] Yes  [] No    Plan:   [x] Continue per plan of care [] Alter current plan (see comments)  [] Plan of care initiated [] Hold pending MD visit [] Discharge  Plan for Next Session:        Electronically signed by:  Batsheva Agarwal, PTA [id number]

## 2021-10-04 ENCOUNTER — TREATMENT (OUTPATIENT)
Dept: PHYSICAL THERAPY | Age: 56
End: 2021-10-04
Payer: COMMERCIAL

## 2021-10-04 DIAGNOSIS — Z98.890 STATUS POST ARTHROSCOPY OF RIGHT SHOULDER: ICD-10-CM

## 2021-10-04 DIAGNOSIS — S46.011A STRAIN OF TENDON OF RIGHT ROTATOR CUFF, INITIAL ENCOUNTER: ICD-10-CM

## 2021-10-04 DIAGNOSIS — S46.111A RUPTURE LONG HEAD BICEPS TENDON, RIGHT, INITIAL ENCOUNTER: Primary | ICD-10-CM

## 2021-10-04 PROCEDURE — 97110 THERAPEUTIC EXERCISES: CPT

## 2021-10-04 NOTE — PROGRESS NOTES
[facility] Outpatient Physical Therapy          Phone: [phone] Fax: [phone]    Physical Therapy Daily Treatment Note  Date:  10/4/2021    Patient Name:  Surinder Carpenter. :  1965  MRN: [id number]    Restrictions/Precautions:    Diagnosis:     Diagnosis Orders   1. Rupture long head biceps tendon, right, initial encounter     2. Strain of tendon of right rotator cuff, initial encounter     3. Status post arthroscopy of right shoulder       Treatment Diagnosis:    Insurance/Certification information:  [facility]  Referring Physician:  Eusebio Coyle MD  Plan of care signed (Y/N):    Visit# / total visits:  3/12  (6-18 visits approved through 10/13/21)  Pain level: 0-1/10   Time In:  0843  Time Out:  910    Subjective:  Pt reported no pain, stated he had residual soreness in R shoulder 24-36 hours after last session    Exercises:  Exercise/Equipment Resistance/Repetitions Other comments     UBE 4 min fwd/4 min bwd Level 6 ( pt set level)      Cybex D1/D2 diagonals 1-1.5 pl x 15 reps each, 4 directions      Cybex single arm punch-outs 1.5 pl x 15 reps      Cybex single arm rows 1.5 pl x 15 reps      Cybex upright rows 1.5 pl x 15 reps      Cybex bicep curls 1.5 pl x 15 reps      Cybex triceps ext  1.5 pl x 15 reps      Cybex shoulder ext 1.5 pl x 15 reps      Overhead press (Cybex vs dumbbells) 3# x 15 reps B         Box lifts 10 # x 15 reps B                                                                      Other   Pt tolerated progressions to program without c/o pain , reported fatigued at end of session.       Home Exercise Program:  N/A    Manual Treatments:  N/A    Modalities:  N/A     Time-in Time-out Total Time   95610  Evaluation Low Complexity      31999  Evaluation Med Complexity      77285  Evaluation High Complexity      50590  Ther Ex 0843 0910 27   [id number]  Neuro Re-ed        15934  Ther Activities        67523  Manual Therapy       36312  E-stim       16588  Ultrasound Session 3728 7605 54       Treatment/Activity Tolerance:  [x] Patient tolerated treatment well [] Patient limited by fatigue  [] Patient limited by pain  [] Patient limited by other medical complications  [] Other:     Prognosis: [x] Good [] Fair  [] Poor    Patient Requires Follow-up: [x] Yes  [] No    Plan:   [x] Continue per plan of care [] Alter current plan (see comments)  [] Plan of care initiated [] Hold pending MD visit [] Discharge  Plan for Next Session:        Electronically signed by:  Real Alvarado, PTA [id number]

## 2021-10-06 ENCOUNTER — TREATMENT (OUTPATIENT)
Dept: PHYSICAL THERAPY | Age: 56
End: 2021-10-06
Payer: COMMERCIAL

## 2021-10-06 DIAGNOSIS — Z98.890 STATUS POST ARTHROSCOPY OF RIGHT SHOULDER: ICD-10-CM

## 2021-10-06 DIAGNOSIS — S46.111A RUPTURE LONG HEAD BICEPS TENDON, RIGHT, INITIAL ENCOUNTER: Primary | ICD-10-CM

## 2021-10-06 DIAGNOSIS — S46.011A STRAIN OF TENDON OF RIGHT ROTATOR CUFF, INITIAL ENCOUNTER: ICD-10-CM

## 2021-10-06 PROCEDURE — 97110 THERAPEUTIC EXERCISES: CPT

## 2021-10-06 NOTE — PROGRESS NOTES
[facility] Outpatient Physical Therapy          Phone: [phone] Fax: [phone]    Physical Therapy Daily Treatment Note  Date:  10/6/2021    Patient Name:  Josesito Champagne :  1965  MRN: [id number]    Restrictions/Precautions:    Diagnosis:     Diagnosis Orders   1. Rupture long head biceps tendon, right, initial encounter     2. Strain of tendon of right rotator cuff, initial encounter     3. Status post arthroscopy of right shoulder       Treatment Diagnosis:    Insurance/Certification information:  [facility]  Referring Physician:  Aicha Sim MD  Plan of care signed (Y/N):    Visit# / total visits:    (6-18 visits approved through 10/13/21)  Pain level: 0-1/10   Time In:  08  Time Out:  913    Subjective:  Pt reported no pain, stated he continues to have residual soreness in R shoulder 24-36 hours after last session    Exercises:  Exercise/Equipment Resistance/Repetitions Other comments     UBE 4 min fwd/4 min bwd Level 6 ( pt set level)      Cybex D1/D2 diagonals 1.5 pl x 15 reps each, 4 directions      Cybex single arm punch-outs 1.5 pl x 15 reps      Cybex single arm rows 1.5 pl x 15 reps      Cybex upright rows 1.5 pl x 15 reps      Cybex bicep curls 1.5 pl x 15 reps      Cybex triceps ext  1.5 pl x 15 reps      Cybex shoulder ext 1.5 pl x 15 reps      Overhead press (Cybex vs dumbbells) 4# x 15 reps B         Box lifts 10 # x 15 reps B                                                                      Other   Pt tolerated progressions to program without c/o pain , reported fatigued at end of session.       Home Exercise Program:  N/A    Manual Treatments:  N/A    Modalities:  N/A     Time-in Time-out Total Time   53909  Evaluation Low Complexity      18923  Evaluation Med Complexity      99748  Evaluation High Complexity      64635  Ther Ex 0843 0913 30   [id number]  Neuro Re-ed        37121  Ther Activities        08029  Manual Therapy       82292  E-stim       57640  Ultrasound [facility] 9292 6991 74       Treatment/Activity Tolerance:  [x] Patient tolerated treatment well [] Patient limited by fatigue  [] Patient limited by pain  [] Patient limited by other medical complications  [] Other:     Prognosis: [x] Good [] Fair  [] Poor    Patient Requires Follow-up: [x] Yes  [] No    Plan:   [x] Continue per plan of care [] Alter current plan (see comments)  [] Plan of care initiated [] Hold pending MD visit [] Discharge  Plan for Next Session:        Electronically signed by:  Real Alvarado, PTA [id number]

## 2021-10-11 ENCOUNTER — TREATMENT (OUTPATIENT)
Dept: PHYSICAL THERAPY | Age: 56
End: 2021-10-11
Payer: COMMERCIAL

## 2021-10-11 DIAGNOSIS — Z98.890 STATUS POST ARTHROSCOPY OF RIGHT SHOULDER: ICD-10-CM

## 2021-10-11 DIAGNOSIS — S46.011A STRAIN OF TENDON OF RIGHT ROTATOR CUFF, INITIAL ENCOUNTER: ICD-10-CM

## 2021-10-11 DIAGNOSIS — S46.111A RUPTURE LONG HEAD BICEPS TENDON, RIGHT, INITIAL ENCOUNTER: Primary | ICD-10-CM

## 2021-10-11 PROCEDURE — 97110 THERAPEUTIC EXERCISES: CPT

## 2021-10-11 NOTE — PROGRESS NOTES
[facility] Outpatient Physical Therapy          Phone: [phone] Fax: [phone]    Physical Therapy Daily Treatment Note  Date:  10/11/2021    Patient Name:  Laura Dominguez. :  1965  MRN: [id number]    Restrictions/Precautions:    Diagnosis:     Diagnosis Orders   1. Rupture long head biceps tendon, right, initial encounter     2. Strain of tendon of right rotator cuff, initial encounter     3. Status post arthroscopy of right shoulder       Treatment Diagnosis:    Insurance/Certification information:  [facility]  Referring Physician:  Media Hammans, MD  Plan of care signed (Y/N):    Visit# / total visits:    (6-18 visits approved through 21)  Pain level: 0-1/10   Time In:  0841  Time Out:  911    Subjective:  Pt reported no pain, stated he continues to have residual soreness in R shoulder 24-36 hours after last session    Exercises:  Exercise/Equipment Resistance/Repetitions Other comments     UBE 4 min fwd/4 min bwd Level 6 ( pt set level)      Cybex D1/D2 diagonals 1.5 pl x 20 reps each, 4 directions      Cybex single arm punch-outs 1.5 pl x 20 reps      Cybex single arm rows 1.5 pl x 20 reps      Cybex upright rows 1.5 pl x 20 reps      Cybex bicep curls 1.5 pl x 20 reps      Cybex triceps ext  1.5 pl x 20 reps      Cybex shoulder ext 1.5 pl x 20 reps      Overhead press (Cybex vs dumbbells) 4# x 20 reps B         Box lifts 10 # x 20 reps B                                                                      Other   Pt tolerated progressions to program without c/o pain , reported fatigued at end of session.       Home Exercise Program:  N/A    Manual Treatments:  N/A    Modalities:  N/A     Time-in Time-out Total Time   27228  Evaluation Low Complexity      79690  Evaluation Med Complexity      58266  Evaluation High Complexity      06678  Ther Ex 0841 0911 30   [id number]  Neuro Re-ed        36236  Ther Activities        20020  Manual Therapy       35931  E-stim       97959  Ultrasound Session 7521 3260 89       Treatment/Activity Tolerance:  [x] Patient tolerated treatment well [] Patient limited by fatigue  [] Patient limited by pain  [] Patient limited by other medical complications  [] Other:     Prognosis: [x] Good [] Fair  [] Poor    Patient Requires Follow-up: [x] Yes  [] No    Plan:   [x] Continue per plan of care [] Alter current plan (see comments)  [] Plan of care initiated [] Hold pending MD visit [] Discharge  Plan for Next Session:        Electronically signed by:  Janna Sarabia, PTA [id number]

## 2021-10-18 ENCOUNTER — TREATMENT (OUTPATIENT)
Dept: PHYSICAL THERAPY | Age: 56
End: 2021-10-18
Payer: COMMERCIAL

## 2021-10-18 DIAGNOSIS — S46.111A RUPTURE LONG HEAD BICEPS TENDON, RIGHT, INITIAL ENCOUNTER: Primary | ICD-10-CM

## 2021-10-18 DIAGNOSIS — S46.011A STRAIN OF TENDON OF RIGHT ROTATOR CUFF, INITIAL ENCOUNTER: ICD-10-CM

## 2021-10-18 DIAGNOSIS — Z98.890 STATUS POST ARTHROSCOPY OF RIGHT SHOULDER: ICD-10-CM

## 2021-10-18 PROCEDURE — 97110 THERAPEUTIC EXERCISES: CPT

## 2021-10-18 NOTE — PROGRESS NOTES
Session 0840 5812 72       Treatment/Activity Tolerance:  [x] Patient tolerated treatment well [] Patient limited by fatigue  [] Patient limited by pain  [] Patient limited by other medical complications  [] Other:     Prognosis: [x] Good [] Fair  [] Poor    Patient Requires Follow-up: [x] Yes  [] No    Plan:   [x] Continue per plan of care [] Alter current plan (see comments)  [] Plan of care initiated [] Hold pending MD visit [] Discharge  Plan for Next Session:        Electronically signed by:  Key Parker, PTA 6526

## 2021-10-25 ENCOUNTER — TREATMENT (OUTPATIENT)
Dept: PHYSICAL THERAPY | Age: 56
End: 2021-10-25
Payer: COMMERCIAL

## 2021-10-25 DIAGNOSIS — Z98.890 STATUS POST ARTHROSCOPY OF RIGHT SHOULDER: ICD-10-CM

## 2021-10-25 DIAGNOSIS — S46.011A STRAIN OF TENDON OF RIGHT ROTATOR CUFF, INITIAL ENCOUNTER: ICD-10-CM

## 2021-10-25 DIAGNOSIS — S46.111A RUPTURE LONG HEAD BICEPS TENDON, RIGHT, INITIAL ENCOUNTER: Primary | ICD-10-CM

## 2021-10-25 PROCEDURE — 97110 THERAPEUTIC EXERCISES: CPT

## 2021-10-25 NOTE — PROGRESS NOTES
[address] [facility] Outpatient Physical Therapy          Phone: [phone] Fax: [phone]    Physical Therapy Daily Treatment Note  Date:  10/25/2021    Patient Name:  Laura Dominguez. :  1965  MRN: [id number]    Restrictions/Precautions:    Diagnosis:     Diagnosis Orders   1. Rupture long head biceps tendon, right, initial encounter     2. Strain of tendon of right rotator cuff, initial encounter     3. Status post arthroscopy of right shoulder       Treatment Diagnosis:    Insurance/Certification information:  [facility]  Referring Physician:  Media Hammans, MD  Plan of care signed (Y/N):    Visit# / total visits:    (6-18 visits approved through 21)  Pain level: 0-1/10   Time In:  0848  Time Out:  920    Subjective:  Pt reported no pain, stated he continues to have residual soreness in R shoulder 24-36 hours after last session    Exercises:  Exercise/Equipment Resistance/Repetitions Other comments     UBE 5 min fwd/5min bwd Level 8 ( pt set level)      Cybex D1/D2 diagonals 1.5 pl x 20 reps each, 4 directions      Cybex single arm punch-outs 1.5 pl x 20 reps      Cybex single arm rows 1.5 pl x 20 reps      Cybex upright rows 1.5 pl x 20 reps      Cybex bicep curls 1.5 pl x 20 reps      Cybex triceps ext  1.5 pl x 20 reps      Cybex shoulder ext 1.5 pl x 20 reps      Overhead press (Cybex vs dumbbells) 4# x 20 reps B         Box lifts 15 # x 20 reps B                                                                      Other   Pt tolerated program without c/o pain , reported fatigued at end of session.   Increase weight next session    Home Exercise Program:  N/A    Manual Treatments:  N/A    Modalities:  N/A     Time-in Time-out Total Time   01210  Evaluation Low Complexity      22608  Evaluation Med Complexity      38682  Evaluation High Complexity      55463  Ther Ex 0848 0920 32   [id number]  Neuro Re-ed        [address] [facility]  Ther Activities        34579  Manual Therapy       54322  E-stim       50833 Ultrasound            Session 7825 8364 91       Treatment/Activity Tolerance:  [x] Patient tolerated treatment well [] Patient limited by fatigue  [] Patient limited by pain  [] Patient limited by other medical complications  [] Other:     Prognosis: [x] Good [] Fair  [] Poor    Patient Requires Follow-up: [x] Yes  [] No    Plan:   [x] Continue per plan of care [] Alter current plan (see comments)  [] Plan of care initiated [] Hold pending MD visit [] Discharge  Plan for Next Session:        Electronically signed by:  Arminda Juarez, PTA [id number]

## 2021-10-27 ENCOUNTER — TREATMENT (OUTPATIENT)
Dept: PHYSICAL THERAPY | Age: 56
End: 2021-10-27
Payer: COMMERCIAL

## 2021-10-27 DIAGNOSIS — S46.011A STRAIN OF TENDON OF RIGHT ROTATOR CUFF, INITIAL ENCOUNTER: ICD-10-CM

## 2021-10-27 DIAGNOSIS — Z98.890 STATUS POST ARTHROSCOPY OF RIGHT SHOULDER: ICD-10-CM

## 2021-10-27 DIAGNOSIS — S46.111A RUPTURE LONG HEAD BICEPS TENDON, RIGHT, INITIAL ENCOUNTER: Primary | ICD-10-CM

## 2021-10-27 PROCEDURE — 97110 THERAPEUTIC EXERCISES: CPT

## 2021-10-27 NOTE — PROGRESS NOTES
[facility]          Phone: [phone] Fax: [phone]    Physical Therapy Daily Treatment Note  Date:  10/27/2021    Patient Name:  Carolann Cope :  1965  MRN: [id number]    Restrictions/Precautions:    Diagnosis:     Diagnosis Orders   1. Rupture long head biceps tendon, right, initial encounter     2. Strain of tendon of right rotator cuff, initial encounter     3. Status post arthroscopy of right shoulder       Treatment Diagnosis:    Insurance/Certification information:  [facility]  Referring Physician:  Radha Wilson MD  Plan of care signed (Y/N):    Visit# / total visits:    (6-18 visits approved through 21)  Pain level: 0-1/10   Time In:  0841  Time Out:  922    Subjective:  Pt reported no pain, stated he continues to have residual soreness in R shoulder 24-36 hours after last session. Pt's  attended session this date    Exercises:  Exercise/Equipment Resistance/Repetitions Other comments     UBE 5 min fwd/5min bwd Level 8.6 ( pt set level)      Cybex D1/D2 diagonals 2 pl x 20 reps each, 4 directions      Cybex single arm punch-outs 2 pl x 20 reps      Cybex single arm rows 2  pl x 20 reps      Cybex upright rows 2  pl x 20 reps      Cybex bicep curls 2  pl x 20 reps      Cybex triceps ext  2  pl x 20 reps      Cybex shoulder ext 2 pl x 20 reps      Overhead press (Cybex vs dumbbells) 5 # x 20 reps B         Box lifts 30 #  4 x 5 reps B                                                                      Other   Pt tolerated program without c/o pain , reported fatigued at end of session.      Home Exercise Program:  N/A    Manual Treatments:  N/A    Modalities:  N/A     Time-in Time-out Total Time   82925  Evaluation Low Complexity      15954  Evaluation Med Complexity      59820  Evaluation High Complexity      54345  Ther Ex [id number]   [id number]  Neuro Re-ed        18894  Ther Activities        59616  Manual Therapy       26660  E-stim 91605  Ultrasound            Session 1481 9547 31       Treatment/Activity Tolerance:  [x] Patient tolerated treatment well [] Patient limited by fatigue  [] Patient limited by pain  [] Patient limited by other medical complications  [] Other:     Prognosis: [x] Good [] Fair  [] Poor    Patient Requires Follow-up: [x] Yes  [] No    Plan:   [x] Continue per plan of care [] Alter current plan (see comments)  [] Plan of care initiated [] Hold pending MD visit [] Discharge  Plan for Next Session:        Electronically signed by:  Kasey Herrera, PTA [id number]

## 2021-11-01 ENCOUNTER — TREATMENT (OUTPATIENT)
Dept: PHYSICAL THERAPY | Age: 56
End: 2021-11-01
Payer: COMMERCIAL

## 2021-11-01 DIAGNOSIS — Z98.890 STATUS POST ARTHROSCOPY OF RIGHT SHOULDER: ICD-10-CM

## 2021-11-01 DIAGNOSIS — S46.111A RUPTURE LONG HEAD BICEPS TENDON, RIGHT, INITIAL ENCOUNTER: Primary | ICD-10-CM

## 2021-11-01 DIAGNOSIS — S46.011A STRAIN OF TENDON OF RIGHT ROTATOR CUFF, INITIAL ENCOUNTER: ICD-10-CM

## 2021-11-01 PROCEDURE — 97110 THERAPEUTIC EXERCISES: CPT | Performed by: PHYSICAL THERAPIST

## 2021-11-01 NOTE — PROGRESS NOTES
[facility] Outpatient Physical Therapy          Phone: [phone] Fax: [phone]    Physical Therapy Daily Treatment Note  Date:  2021    Patient Name:  Becca Sebastian. :  1965  MRN: [id number]    Restrictions/Precautions:    Diagnosis:     Diagnosis Orders   1. Rupture long head biceps tendon, right, initial encounter     2. Strain of tendon of right rotator cuff, initial encounter     3. Status post arthroscopy of right shoulder       Treatment Diagnosis:    Insurance/Certification information:  [facility]  Referring Physician:  Jermain Wills MD  Plan of care signed (Y/N):    Visit# / total visits:    (6-18 visits approved through 21)  Pain level: 0-1/10   Time In:  0845  Time Out:  920    Subjective:  Pt reported no pain, but has increased soreness this date in R shoulder. Pt can not attribute this to any specific activity    Exercises:  Exercise/Equipment Resistance/Repetitions Other comments     UBE 5 min fwd/5min bwd Level 8 ( pt set level)      Cybex D1/D2 diagonals 2 pl x 20 reps each, 4 directions      Cybex single arm punch-outs 2 pl x 20 reps      Cybex single arm rows 2  pl x 20 reps      Cybex upright rows 2  pl x 20 reps      Cybex bicep curls 2  pl x 20 reps      Cybex triceps ext  2  pl x 20 reps      Cybex shoulder ext 2 pl x 20 reps      Overhead press (Cybex vs dumbbells) 5 # x 20 reps B         Box lifts 30 #  4 x 5 reps B                                                                      Other   Pt tolerated program without c/o pain , reported fatigued at end of session.      Home Exercise Program:  N/A    Manual Treatments:  N/A    Modalities:  N/A     Time-in Time-out Total Time   09778  Evaluation Low Complexity      61665  Evaluation Med Complexity      41749  Evaluation High Complexity      79419  Ther Ex 0845 0920 35   [id number]  Neuro Re-ed        44754  Ther Activities        09784  Manual Therapy       92289  E-stim       30448  Ultrasound Session 0845 9257 35       Treatment/Activity Tolerance:  [x] Patient tolerated treatment well [] Patient limited by fatigue  [] Patient limited by pain  [] Patient limited by other medical complications  [] Other:     Prognosis: [x] Good [] Fair  [] Poor    Patient Requires Follow-up: [x] Yes  [] No    Plan:   [x] Continue per plan of care [] Alter current plan (see comments)  [] Plan of care initiated [] Hold pending MD visit [] Discharge  Plan for Next Session:        Electronically signed by:  Niels Bustamante, PTA 1793

## 2021-11-08 ENCOUNTER — TREATMENT (OUTPATIENT)
Dept: PHYSICAL THERAPY | Age: 56
End: 2021-11-08
Payer: COMMERCIAL

## 2021-11-08 DIAGNOSIS — S46.011A STRAIN OF TENDON OF RIGHT ROTATOR CUFF, INITIAL ENCOUNTER: ICD-10-CM

## 2021-11-08 DIAGNOSIS — Z98.890 STATUS POST ARTHROSCOPY OF RIGHT SHOULDER: ICD-10-CM

## 2021-11-08 DIAGNOSIS — S46.111A RUPTURE LONG HEAD BICEPS TENDON, RIGHT, INITIAL ENCOUNTER: Primary | ICD-10-CM

## 2021-11-08 PROCEDURE — 97110 THERAPEUTIC EXERCISES: CPT

## 2021-11-08 NOTE — PROGRESS NOTES
[facility] Outpatient Physical Therapy          Phone: [phone] Fax: [phone]    Physical Therapy Daily Treatment Note  Date:  2021    Patient Name:  Radha Razo :  1965  MRN: [id number]    Restrictions/Precautions:    Diagnosis:     Diagnosis Orders   1. Rupture long head biceps tendon, right, initial encounter     2. Strain of tendon of right rotator cuff, initial encounter     3.  Status post arthroscopy of right shoulder       Treatment Diagnosis:    Insurance/Certification information:  [facility]  Referring Physician:  Keegan Young MD  Plan of care signed (Y/N):    Visit# / total visits:  10/12  (6-18 visits approved through 21)  Pain level: 0-1/10   Time In:  0844  Time Out: 918    Subjective:  Pt reported no pain, \"just the average soreness \"     Exercises:  Exercise/Equipment Resistance/Repetitions Other comments     UBE 5 min fwd/5min bwd Level 9     Cybex D1/D2 diagonals 2.5 pl x 20 reps each, 4 directions      Cybex single arm punch-outs 2.5 pl x 20 reps      Cybex single arm rows 2.5  pl x 20 reps      Cybex upright rows 2.5 pl x 20 reps      Cybex bicep curls 2.5  pl x 20 reps      Cybex triceps ext  2.5  pl x 20 reps      Cybex shoulder ext 2.5 pl x 20 reps      Overhead press ( vs dumbbells) 5 # x 20 reps B         Box lifts 30 #  4 x 5 reps B                                                                      Other   Pt tolerated program without c/o pain , continues to report muscle fatigue at end of session R shoulder     Home Exercise Program:  N/A    Manual Treatments:  N/A    Modalities:  N/A     Time-in Time-out Total Time   92489  Evaluation Low Complexity      13373  Evaluation Med Complexity      68909  Evaluation High Complexity      25780  Ther Ex 0844 918 34   64261  Neuro Re-ed        21646  Ther Activities        82823  Manual Therapy       87223  E-stim       82457  Ultrasound            Session 0402 463 00       Treatment/Activity Tolerance:  [x] Patient tolerated treatment well [] Patient limited by fatigue  [] Patient limited by pain  [] Patient limited by other medical complications  [] Other:     Prognosis: [x] Good [] Fair  [] Poor    Patient Requires Follow-up: [x] Yes  [] No    Plan:   [x] Continue per plan of care [] Alter current plan (see comments)  [] Plan of care initiated [] Hold pending MD visit [] Discharge  Plan for Next Session:        Electronically signed by:  David Webb, PTA [id number]

## 2021-11-10 ENCOUNTER — TREATMENT (OUTPATIENT)
Dept: PHYSICAL THERAPY | Age: 56
End: 2021-11-10
Payer: COMMERCIAL

## 2021-11-10 DIAGNOSIS — S46.111A RUPTURE LONG HEAD BICEPS TENDON, RIGHT, INITIAL ENCOUNTER: Primary | ICD-10-CM

## 2021-11-10 DIAGNOSIS — Z98.890 STATUS POST ARTHROSCOPY OF RIGHT SHOULDER: ICD-10-CM

## 2021-11-10 DIAGNOSIS — S46.011A STRAIN OF TENDON OF RIGHT ROTATOR CUFF, INITIAL ENCOUNTER: ICD-10-CM

## 2021-11-10 PROCEDURE — 97110 THERAPEUTIC EXERCISES: CPT

## 2021-11-10 NOTE — PROGRESS NOTES
[facility] Outpatient Physical Therapy          Phone: [phone] Fax: [phone]    Physical Therapy Daily Treatment Note  Date:  11/10/2021    Patient Name:  Maribeth Calvin :  1965  MRN: [id number]    Restrictions/Precautions:    Diagnosis:     Diagnosis Orders   1. Rupture long head biceps tendon, right, initial encounter     2. Strain of tendon of right rotator cuff, initial encounter     3.  Status post arthroscopy of right shoulder       Treatment Diagnosis:    Insurance/Certification information:  [facility]  Referring Physician:  Gatito Mtz MD  Plan of care signed (Y/N):    Visit# / total visits:    (6-18 visits approved through 21)  Pain level: 0-10   Time In:  0848  Time Out: 926    Subjective:  Pt reported no pain, \"just the average soreness \"     Exercises:  Exercise/Equipment Resistance/Repetitions Other comments     UBE 5 min fwd/5min bwd Level 9     Cybex D1/D2 diagonals 2.5 pl x 20 reps each, 4 directions      Cybex single arm punch-outs 2.5 pl x 20 reps      Cybex single arm rows 2.5  pl x 20 reps      Cybex upright rows 2.5 pl x 20 reps      Cybex bicep curls 2.5  pl x 20 reps      Cybex triceps ext  2.5  pl x 20 reps      Cybex shoulder ext 2.5 pl x 20 reps      Overhead press ( vs dumbbells) 5 # x 20 reps B         Box lifts 30 #  4 x 5 reps B              Quick Dash   22.7%              MMT  R UE     shoulder Flex  5/5                    abd   5/5       IR   5/5       ER  5/5                    Elbow  5/5              Other   Pt tolerated program without c/o pain , continues to report muscle fatigue at end of session R shoulder     Home Exercise Program:  Pt is independent w/ gym strengthening program. Recommend pt to continue independently w/ strengthening ex , at a private / home gym or public gym    Manual Treatments:  N/A    Modalities:  N/A     Time-in Time-out Total Time   11977  Evaluation Low Complexity      78371  Evaluation Med Complexity      978 5679 Evaluation High Complexity      24335  Ther Ex 0891 926 38   [id number]  Neuro Re-ed        80523  Ther Activities        22103  Manual Therapy       11787  E-stim       19709  Ultrasound            Session 9778 922 38       Treatment/Activity Tolerance:  [x] Patient tolerated treatment well [] Patient limited by fatigue  [] Patient limited by pain  [] Patient limited by other medical complications  [] Other:     Prognosis: [x] Good [] Fair  [] Poor    Patient Requires Follow-up: [x] Yes  [] No    Plan:   [] Continue per plan of care [] Alter current plan (see comments)  [] Plan of care initiated [] Hold pending MD visit [x] Discharge    Plan    Recommend pt to continue independently w/ strengthening ex , at a private / home gym or public gym      Electronically signed by:  Kandy Cedeno, PTA [id number]

## 2021-11-10 NOTE — PROGRESS NOTES
[facility]                Phone: [phone] Fax: [phone]    Physical Therapy  Outpatient Discharge Summary     Date:  11/10/2021    Patient Name:  Wei Waddell. :  1965  MRN: [id number]    DIAGNOSIS:     Diagnosis Orders   1. Rupture long head biceps tendon, right, initial encounter     2. Strain of tendon of right rotator cuff, initial encounter     3. Status post arthroscopy of right shoulder       REFERRING PHYSICIAN:  Sarah Mckeon MD    ATTENDANCE:  Pt has attended 11 of 11 scheduled treatments from 21 to 11/10/21. TREATMENTS RECEIVED:  Strength training, pt education    INITIAL STATUS:  · Strength: right shoulder 4/5 throughout  · QuickDASH: 20.5%    FINAL STATUS:  · Strength: 5/5 throughout  · QuickDASH: 22.5% disability  · Pt is independent with exercise program    GOALS:  2 out of 3 Long Term Goals were obtained. LONG TERM GOALS NOT OBTAINED/REASON:  Strength and HEP goals met, however, pt self-reporting of disability has not changed since evaluation despite improved strength. PATIENT GOALS:  full use of arm, return to work    REASON FOR DISCHARGE:  Pt has received maximum benefit from PT and is able to manage condition independently. PATIENT EDUCATION/INSTRUCTIONS:  Pt educated in strength training exercises. RECOMMENDATIONS:  Discharge with recommendation to continue with strength training activities at a home gym or public gym. Thank you for the opportunity to work with your patient. If you have questions or comments, please feel free to contact me by phone or fax.       Electronically Signed by: Tim Echols, [id number]  11/10/2021

## 2021-11-24 ENCOUNTER — OFFICE VISIT (OUTPATIENT)
Dept: ORTHOPEDIC SURGERY | Age: 56
End: 2021-11-24
Payer: COMMERCIAL

## 2021-11-24 VITALS — WEIGHT: 210 LBS | BODY MASS INDEX: 30.06 KG/M2 | HEIGHT: 70 IN

## 2021-11-24 DIAGNOSIS — Z98.890 STATUS POST ARTHROSCOPY OF RIGHT SHOULDER: Primary | ICD-10-CM

## 2021-11-24 PROCEDURE — 99213 OFFICE O/P EST LOW 20 MIN: CPT | Performed by: ORTHOPAEDIC SURGERY

## 2021-11-24 NOTE — PROGRESS NOTES
Follow Up Post Operative Visit     Surgery: Right shoulder arthroscopy, subacromial decompression with acromioplasty, subscapularis repair single anchor  Date: 12/28/2020       Subjective:    Gary Dent. is here for follow up visit s/p above procedure. He is doing well. He has been compliant with postoperative plan of care. Continues with physical therapy. Patient states he feels that he has achieved maximum improvement. Controlled Substances Monitoring:        Physical Exam:    No data recorded    General: Alert and oriented x3, no acute distress  Cardiovascular/pulmonary: No labored breathing, peripheral perfusion intact  Musculoskeletal:    Right shoulder exam range of motion 170/45/L1. Jobes, speeds, [place]'s exams are intact. Mild anterior tenderness on palpation. No swelling or deformity visualized on inspection. Belly press exam was intact. Imaging: No new imaging obtained today. Previous imaging reviewed    Assessment and Plan: Status post right shoulder arthroscopy, subacromial decompression with acromioplasty, subscapularis repair    Today we discussed his right shoulder. Patient is 11 months out from procedure listed above. He is doing well and has full range of motion without weakness present on exam today. He continues with PT. Discussed that he can begin transitioning to home exercises independently. On exam rotator cuff repair is intact he has no weakness present. He has minimal pain. He can return to work with lifting restrictions not exceeding 50 pounds. He will follow-up as needed. Patient verbalized understanding. AGNES Rodriguez-CNP  Orthopedic Surgery   11/24/21  11:40 AM    Staff Addendum    I have seen and evaluated the patient and agree with the assessment and plan as documented by Bonnie Jesus CNP.  I have performed the key components of the history and physical examination and concur with the findings and plan, and have made changes where appropriate/necessary.         Edison Hyatt MD  [address] [place] [address]

## 2021-11-24 NOTE — PROGRESS NOTES
Follow Up Visit     Surinder Aguilar returns today for follow up visit regarding right shoulder arthroscopy, subacromial decompression with acromioplasty, subscapularis repair single anchor. Treatment thus far has included Surgery  with good improvement. He reports symptoms are improved.        REVIEW OF SYSTEMS:     Constitutional:  Negative for weight loss, fevers, chills, fatigue  Cardiovascular: Negative for chest pain, palpitations  Pulmonary: Negative for shortness of breath, labored breathing, cough  GI: negative for abdominal pain, nausea, vomitting   MSK: per HPI  Skin: negative for rash, open wounds    All other systems reviewed and are negative       Physical Exam:     No data recorded    General: Alert and oriented x3, no acute distress  Cardiovascular/pulmonary: No labored breathing, peripheral perfusion intact  Musculoskeletal:    ***    Controlled Substances Monitoring:      Imaging:  ***      Assessment: ***      Plan:   ***    Eusebio Coyle MD  Orthopaedic Surgery   11/24/21  9:22 AM

## 2021-12-02 ENCOUNTER — HOSPITAL ENCOUNTER (OUTPATIENT)
Age: 56
Discharge: HOME | End: 2021-12-02
Payer: COMMERCIAL

## 2021-12-02 ENCOUNTER — HOSPITAL ENCOUNTER (OUTPATIENT)
Dept: GENERAL RADIOLOGY | Age: 56
Discharge: HOME | End: 2021-12-04
Payer: COMMERCIAL

## 2021-12-02 ENCOUNTER — HOSPITAL ENCOUNTER (OUTPATIENT)
Age: 56
Discharge: HOME | End: 2021-12-04
Payer: COMMERCIAL

## 2021-12-02 DIAGNOSIS — F11.90 OPIATE USE: ICD-10-CM

## 2021-12-02 DIAGNOSIS — R05.9 COUGH: ICD-10-CM

## 2021-12-02 DIAGNOSIS — E78.2 MIXED HYPERLIPIDEMIA: ICD-10-CM

## 2021-12-02 LAB
ALBUMIN SERPL-MCNC: 4.3 G/DL (ref 3.5–5.2)
ALP BLD-CCNC: 70 U/L (ref 40–129)
ALT SERPL-CCNC: 48 U/L (ref 0–40)
AMPHETAMINE SCREEN, URINE: NOT DETECTED
ANION GAP SERPL CALCULATED.3IONS-SCNC: 8 MMOL/L (ref 7–16)
AST SERPL-CCNC: 33 U/L (ref 0–39)
BARBITURATE SCREEN URINE: NOT DETECTED
BENZODIAZEPINE SCREEN, URINE: NOT DETECTED
BILIRUB SERPL-MCNC: 0.5 MG/DL (ref 0–1.2)
BUN BLDV-MCNC: 15 MG/DL (ref 6–20)
CALCIUM SERPL-MCNC: 9.9 MG/DL (ref 8.6–10.2)
CANNABINOID SCREEN URINE: POSITIVE
CHLORIDE BLD-SCNC: 102 MMOL/L (ref 98–107)
CHOLESTEROL, FASTING: 149 MG/DL (ref 0–199)
CO2: 28 MMOL/L (ref 22–29)
COCAINE METABOLITE SCREEN URINE: NOT DETECTED
CREAT SERPL-MCNC: 0.9 MG/DL (ref 0.7–1.2)
FENTANYL SCREEN, URINE: NOT DETECTED
GFR AFRICAN AMERICAN: >60
GFR NON-AFRICAN AMERICAN: >60 ML/MIN/1.73
GLUCOSE BLD-MCNC: 116 MG/DL (ref 74–99)
HDLC SERPL-MCNC: 29 MG/DL
LDL CHOLESTEROL CALCULATED: 93 MG/DL (ref 0–99)
Lab: ABNORMAL
METHADONE SCREEN, URINE: NOT DETECTED
OPIATE SCREEN URINE: POSITIVE
OXYCODONE URINE: NOT DETECTED
PHENCYCLIDINE SCREEN URINE: NOT DETECTED
POTASSIUM SERPL-SCNC: 4.6 MMOL/L (ref 3.5–5)
SODIUM BLD-SCNC: 138 MMOL/L (ref 132–146)
TOTAL PROTEIN: 7.7 G/DL (ref 6.4–8.3)
TRIGLYCERIDE, FASTING: 136 MG/DL (ref 0–149)
VLDLC SERPL CALC-MCNC: 27 MG/DL

## 2021-12-02 PROCEDURE — 71046 X-RAY EXAM CHEST 2 VIEWS: CPT

## 2021-12-02 PROCEDURE — 36415 COLL VENOUS BLD VENIPUNCTURE: CPT

## 2021-12-02 PROCEDURE — 80061 LIPID PANEL: CPT

## 2021-12-02 PROCEDURE — 80053 COMPREHEN METABOLIC PANEL: CPT

## 2021-12-02 PROCEDURE — 80307 DRUG TEST PRSMV CHEM ANLYZR: CPT

## 2022-04-13 ENCOUNTER — OFFICE VISIT (OUTPATIENT)
Dept: ORTHOPEDIC SURGERY | Age: 57
End: 2022-04-13
Payer: COMMERCIAL

## 2022-04-13 VITALS — BODY MASS INDEX: 28 KG/M2 | WEIGHT: 200 LBS | HEIGHT: 71 IN

## 2022-04-13 DIAGNOSIS — Z98.890 STATUS POST ARTHROSCOPY OF RIGHT SHOULDER: Primary | ICD-10-CM

## 2022-04-13 PROCEDURE — 99213 OFFICE O/P EST LOW 20 MIN: CPT | Performed by: ORTHOPAEDIC SURGERY

## 2022-04-13 NOTE — PROGRESS NOTES
Follow Up Visit     Lucy Fisher returns today for follow up visit regarding Right shoulder arthroscopy, subacromial decompression with acromioplasty, subscapularis repair single anchor (12/28/2020). He reports symptoms are improved. REVIEW OF SYSTEMS:     Constitutional:  Negative for weight loss, fevers, chills, fatigue  Cardiovascular: Negative for chest pain, palpitations  Pulmonary: Negative for shortness of breath, labored breathing, cough  GI: negative for abdominal pain, nausea, vomitting   MSK: per HPI  Skin: negative for rash, open wounds    All other systems reviewed and are negative       Physical Exam:     No data recorded    General: Alert and oriented x3, no acute distress  Cardiovascular/pulmonary: No labored breathing, peripheral perfusion intact  Musculoskeletal:    Right shoulder exam range of motion 160/55/lumbar region. Jobes speeds and [place]'s exams are intact. Belly press exam is mildly stiff without pain. No swelling deformity visualized. Nontender to palpation    Controlled Substances Monitoring:      Imaging:  No new imaging obtained today. Previous imaging reviewed      Assessment: Status post right shoulder arthroscopy, subacromial decompression with acromioplasty, subscapularis repair x16 months      Plan: Today we discussed his right shoulder. Patient is about 16 months out from procedure listed above. On exam today he has near full range of motion and maintained strength on rotator cuff testing. Overall he is doing very well and can rereturn to work. We will continue with long-term lifting restrictions not exceeding 50 pounds. Patient verbalized understanding. He will follow-up as needed. PAULINA Mg  Orthopedic Surgery   04/13/22  9:05 AM    Staff Addendum    I have seen and evaluated the patient and agree with the assessment and plan as documented by Rhiannon Iqbal CNP.  I have performed the key components of the history and physical examination and concur with the findings and plan, and have made changes where appropriate/necessary.         Salvatoreyvonne Mandujano MD  [address]

## 2022-05-07 ENCOUNTER — HOSPITAL ENCOUNTER (OUTPATIENT)
Age: 57
Discharge: HOME | End: 2022-05-07
Payer: COMMERCIAL

## 2022-05-07 LAB
ALBUMIN SERPL-MCNC: 4.4 G/DL (ref 3.5–5.2)
ALP BLD-CCNC: 69 U/L (ref 40–129)
ALT SERPL-CCNC: 31 U/L (ref 0–40)
AMPHETAMINE SCREEN, URINE: NOT DETECTED
ANION GAP SERPL CALCULATED.3IONS-SCNC: 10 MMOL/L (ref 7–16)
AST SERPL-CCNC: 24 U/L (ref 0–39)
BARBITURATE SCREEN URINE: NOT DETECTED
BENZODIAZEPINE SCREEN, URINE: NOT DETECTED
BILIRUB SERPL-MCNC: 0.4 MG/DL (ref 0–1.2)
BUN BLDV-MCNC: 16 MG/DL (ref 6–20)
CALCIUM SERPL-MCNC: 9.7 MG/DL (ref 8.6–10.2)
CANNABINOID SCREEN URINE: NOT DETECTED
CHLORIDE BLD-SCNC: 102 MMOL/L (ref 98–107)
CHOLESTEROL, TOTAL: 187 MG/DL (ref 0–199)
CO2: 25 MMOL/L (ref 22–29)
COCAINE METABOLITE SCREEN URINE: NOT DETECTED
CREAT SERPL-MCNC: 1 MG/DL (ref 0.7–1.2)
FENTANYL SCREEN, URINE: NOT DETECTED
GFR AFRICAN AMERICAN: >60
GFR NON-AFRICAN AMERICAN: >60 ML/MIN/1.73
GLUCOSE BLD-MCNC: 112 MG/DL (ref 74–99)
HDLC SERPL-MCNC: 33 MG/DL
LDL CHOLESTEROL CALCULATED: 130 MG/DL (ref 0–99)
Lab: ABNORMAL
METHADONE SCREEN, URINE: NOT DETECTED
OPIATE SCREEN URINE: POSITIVE
OXYCODONE URINE: NOT DETECTED
PHENCYCLIDINE SCREEN URINE: NOT DETECTED
POTASSIUM SERPL-SCNC: 4.6 MMOL/L (ref 3.5–5)
SODIUM BLD-SCNC: 137 MMOL/L (ref 132–146)
TOTAL PROTEIN: 7.7 G/DL (ref 6.4–8.3)
TRIGL SERPL-MCNC: 121 MG/DL (ref 0–149)
VLDLC SERPL CALC-MCNC: 24 MG/DL

## 2022-05-07 PROCEDURE — 36415 COLL VENOUS BLD VENIPUNCTURE: CPT

## 2022-05-07 PROCEDURE — 80061 LIPID PANEL: CPT

## 2022-05-07 PROCEDURE — 80307 DRUG TEST PRSMV CHEM ANLYZR: CPT

## 2022-05-07 PROCEDURE — 80053 COMPREHEN METABOLIC PANEL: CPT

## 2022-08-13 ENCOUNTER — HOSPITAL ENCOUNTER (OUTPATIENT)
Age: 57
Discharge: HOME | End: 2022-08-13
Payer: COMMERCIAL

## 2022-08-13 LAB
ALBUMIN SERPL-MCNC: 4.4 G/DL (ref 3.5–5.2)
ALP BLD-CCNC: 69 U/L (ref 40–129)
ALT SERPL-CCNC: 22 U/L (ref 0–40)
AMPHETAMINE SCREEN, URINE: NOT DETECTED
ANION GAP SERPL CALCULATED.3IONS-SCNC: 11 MMOL/L (ref 7–16)
AST SERPL-CCNC: 25 U/L (ref 0–39)
BACTERIA: ABNORMAL /HPF
BARBITURATE SCREEN URINE: NOT DETECTED
BASOPHILS ABSOLUTE: 0.1 E9/L (ref 0–0.2)
BASOPHILS RELATIVE PERCENT: 0.8 % (ref 0–2)
BENZODIAZEPINE SCREEN, URINE: NOT DETECTED
BILIRUB SERPL-MCNC: 0.4 MG/DL (ref 0–1.2)
BILIRUBIN URINE: NEGATIVE
BLOOD, URINE: NORMAL
BUN BLDV-MCNC: 14 MG/DL (ref 6–20)
CALCIUM SERPL-MCNC: 9.5 MG/DL (ref 8.6–10.2)
CANNABINOID SCREEN URINE: NOT DETECTED
CHLORIDE BLD-SCNC: 102 MMOL/L (ref 98–107)
CHOLESTEROL, TOTAL: 158 MG/DL (ref 0–199)
CLARITY: CLEAR
CO2: 24 MMOL/L (ref 22–29)
COCAINE METABOLITE SCREEN URINE: NOT DETECTED
COLOR: YELLOW
CREAT SERPL-MCNC: 1 MG/DL (ref 0.7–1.2)
EOSINOPHILS ABSOLUTE: 0.41 E9/L (ref 0.05–0.5)
EOSINOPHILS RELATIVE PERCENT: 3.3 % (ref 0–6)
EPITHELIAL CELLS, UA: ABNORMAL /HPF
FENTANYL SCREEN, URINE: NOT DETECTED
GFR AFRICAN AMERICAN: >60
GFR NON-AFRICAN AMERICAN: >60 ML/MIN/1.73
GLUCOSE BLD-MCNC: 104 MG/DL (ref 74–99)
GLUCOSE URINE: NEGATIVE MG/DL
HCT VFR BLD CALC: 47.5 % (ref 37–54)
HDLC SERPL-MCNC: 34 MG/DL
HEMOGLOBIN: 16.4 G/DL (ref 12.5–16.5)
IMMATURE GRANULOCYTES #: 0.05 E9/L
IMMATURE GRANULOCYTES %: 0.4 % (ref 0–5)
KETONES, URINE: NEGATIVE MG/DL
LDL CHOLESTEROL CALCULATED: 107 MG/DL (ref 0–99)
LEUKOCYTE ESTERASE, URINE: NEGATIVE
LYMPHOCYTES ABSOLUTE: 3.45 E9/L (ref 1.5–4)
LYMPHOCYTES RELATIVE PERCENT: 27.4 % (ref 20–42)
Lab: ABNORMAL
MCH RBC QN AUTO: 30 PG (ref 26–35)
MCHC RBC AUTO-ENTMCNC: 34.5 % (ref 32–34.5)
MCV RBC AUTO: 87 FL (ref 80–99.9)
METHADONE SCREEN, URINE: NOT DETECTED
MONOCYTES ABSOLUTE: 0.75 E9/L (ref 0.1–0.95)
MONOCYTES RELATIVE PERCENT: 6 % (ref 2–12)
NEUTROPHILS ABSOLUTE: 7.83 E9/L (ref 1.8–7.3)
NEUTROPHILS RELATIVE PERCENT: 62.1 % (ref 43–80)
NITRITE, URINE: NEGATIVE
OPIATE SCREEN URINE: POSITIVE
OXYCODONE URINE: NOT DETECTED
PDW BLD-RTO: 13.3 FL (ref 11.5–15)
PH UA: 5 (ref 5–9)
PHENCYCLIDINE SCREEN URINE: NOT DETECTED
PLATELET # BLD: 256 E9/L (ref 130–450)
PMV BLD AUTO: 9.3 FL (ref 7–12)
POTASSIUM SERPL-SCNC: 4.6 MMOL/L (ref 3.5–5)
PROSTATE SPECIFIC ANTIGEN: 1.87 NG/ML (ref 0–4)
PROTEIN UA: NEGATIVE MG/DL
RBC # BLD: 5.46 E12/L (ref 3.8–5.8)
RBC UA: ABNORMAL /HPF (ref 0–2)
SODIUM BLD-SCNC: 137 MMOL/L (ref 132–146)
SPECIFIC GRAVITY UA: >=1.03 (ref 1–1.03)
TOTAL PROTEIN: 7.6 G/DL (ref 6.4–8.3)
TRIGL SERPL-MCNC: 83 MG/DL (ref 0–149)
UROBILINOGEN, URINE: 0.2 E.U./DL
VLDLC SERPL CALC-MCNC: 17 MG/DL
WBC # BLD: 12.6 E9/L (ref 4.5–11.5)
WBC UA: ABNORMAL /HPF (ref 0–5)

## 2022-08-13 PROCEDURE — 36415 COLL VENOUS BLD VENIPUNCTURE: CPT

## 2022-08-13 PROCEDURE — 80053 COMPREHEN METABOLIC PANEL: CPT

## 2022-08-13 PROCEDURE — 80061 LIPID PANEL: CPT

## 2022-08-13 PROCEDURE — G0103 PSA SCREENING: HCPCS

## 2022-08-13 PROCEDURE — 85025 COMPLETE CBC W/AUTO DIFF WBC: CPT

## 2022-08-13 PROCEDURE — 80307 DRUG TEST PRSMV CHEM ANLYZR: CPT

## 2022-08-13 PROCEDURE — 81001 URINALYSIS AUTO W/SCOPE: CPT

## 2022-08-25 NOTE — PROGRESS NOTES
[facility] Outpatient Physical Therapy  Phone: [phone]   Fax: [phone]    Physical Therapy Daily Treatment Note  Date:  2021    Patient Name:  Scott Her. :  1965  MRN: [id number]    Evaluating therapist:  BLANCO Vidal              (21)  Restrictions/Precautions:    Diagnosis:   s/p SAD/subscap repair/acromioplasty R shoulder                       (20)  Treatment Diagnosis:    Insurance/Certification information:  [facility]      ( c-9  exp 21 )  Referring Physician:  Talisha Benton  santos signed (Y/N):    Visit# / total visits:  -  Pain level: 3-4/10   Time In:  925  Time Out:  1005    Subjective:  Pt reported R shoulder was \" sore \" not painful. He has been doing yard work, and scraping/ paining porch    Exercises:  Exercise/Equipment Resistance/Repetitions Other comments   UBE   5 min F/B        pendulums   20x 3lb ea  PRN                 supine wand flex 2x15 3lbx3s                chest press 2x15 3lb     SL  ABD R  3 # 2 x 10 reps     SL   ER  R 3 # 2 x 10 reps          Rows  GTB 2 x 10 reps     Shoulder ext  GTB 2 x 10 reps     IR/ER GTB 2 x 10 reps     Horizontal ABD GTB x 10 reps     IR stretch  15 sec x 5 reps          Bent sh flex, ABD, ext 2 #  X 15 reps each         Cybex      Rows  1.5 plates x 10 reps                     diagonals TBA                    Punch outs 1.5 plates x 10 reps            3/17/21 5/13/21    R shoulder:  flex=  °                         abd=  °                         IR= L5 45°                         ER= C5 85°             Other   Pt performed ex w/ good pacing and effort . Pt reported no increase in pain after session. \"just soreness . \"  Tolerated progressions     Home Exercise Program:  provided 21    Manual Treatments:  N/A    Modalities:  IFC/MH to R shoulder x 15 min ( declined today d/t time constraints )     Timed Code Treatment Minutes: 40  Total Treatment Minutes: Spoke with Gert, doing well, no questions for me to day. no issues getting and/or taking their medications, reviewed their action plan, in their green zone.  Reminded when we will call again and to call before if there is a question.  Shea De León, RT, CTTS, Chronic Pulmonary Disease Specialist     40    Treatment/Activity Tolerance:  [x] Patient tolerated treatment well [] Patient limited by fatique  [] Patient limited by pain  [] Patient limited by other medical complications  [] Other:     Prognosis: [x] Good [] Fair  [] Poor    Patient Requires Follow-up: [x] Yes  [] No    Plan:   [x] Continue per plan of care [] Alter current plan (see comments)  [] Plan of care initiated [] Hold pending MD visit [] Discharge    Plan for Next Session:  Get measurements      See Weekly Progress Note: []  Yes  [x]  No  Next due:       Time-in Time-out Total Time   36262  Ther Ex 293 2011 01   37817  Neuro Re-ed        61075  Ther Activities        91984  Manual Therapy       95103  E-stim                   Session 131 4143 40   Electronically signed by:  Tee Friday PTA [id number]

## 2022-08-30 ENCOUNTER — HOSPITAL ENCOUNTER (OUTPATIENT)
Dept: CT IMAGING | Age: 57
Discharge: HOME | End: 2022-09-01
Payer: COMMERCIAL

## 2022-08-30 DIAGNOSIS — R06.02 SHORTNESS OF BREATH: ICD-10-CM

## 2022-08-30 DIAGNOSIS — R91.1 LUNG NODULE: ICD-10-CM

## 2022-08-30 DIAGNOSIS — Z86.16 HISTORY OF COVID-19: ICD-10-CM

## 2022-08-30 PROCEDURE — 71270 CT THORAX DX C-/C+: CPT

## 2022-08-30 PROCEDURE — 6360000004 HC RX CONTRAST MEDICATION: Performed by: RADIOLOGY

## 2022-08-30 RX ADMIN — IOPAMIDOL 75 ML: 755 INJECTION, SOLUTION INTRAVENOUS at 07:05

## 2022-11-07 NOTE — ED PROVIDER NOTES
---------------------------   The nursing notes within the ED encounter and vital signs as below have been reviewed. /72   Pulse 80   Temp 97.6 °F (36.4 °C) (Temporal)   Resp 16   Ht 5' 11\" (1.803 m)   Wt 180 lb (81.6 kg)   SpO2 94%   BMI 25.10 kg/m²   Oxygen Saturation Interpretation: Normal      ---------------------------------------------------PHYSICAL EXAM--------------------------------------      Constitutional/General: Alert and oriented x3, well appearing, non toxic in NAD  Head: NC/AT  Eyes: PERRL, EOMI  Mouth: Oropharynx clear, handling secretions, no trismus  Neck: Supple, full ROM, no meningeal signs  Pulmonary: Lungs clear to auscultation bilaterally, no wheezes, rales, or rhonchi. Not in respiratory distress  Cardiovascular:  Regular rate and rhythm, no murmurs, gallops, or rubs. 2+ distal pulses  Abdomen: Soft, non tender, non distended,   Extremities: There is no gross deformity or dislocation to the right shoulder however there is tenderness in the proximal right humerus and there appears to be a divot in the biceps muscle in the proximal humerus, question rupture or partial rupture of the biceps tendon.   The patient is able to flex his forearm on the right however it is not as strong as when he flexes the forearm on the left when he flexes his forearm on the right a lump or mass appears in the area of the biceps muscle  Skin: warm and dry without rash  Neurologic: GCS 15,  Psych: Normal Affect      ------------------------------ ED COURSE/MEDICAL DECISION MAKING----------------------  Medications   ibuprofen (ADVIL;MOTRIN) tablet 800 mg (800 mg Oral Given 6/2/20 7594)         Medical Decision Making:    Patient had x-rays which do not show any fracture dislocation MRI would be the best study but is not available so we will do a CT scan of the right shoulder and humerus and reassess the situation  Radiology did not comment on the biceps tendon on the CT scan and MRIs not available at this facility at this time. Orthopedic surgery was consulted, Dr. Aden Castillo and he agrees that clinically it sounds like ruptured biceps tendon. Patient be placed in a sling and given anti-inflammatory medications he has pain medicine at home and he will call orthopedics and follow-up and may need surgery in the near future. Counseling: The emergency provider has spoken with the patient and discussed todays results, in addition to providing specific details for the plan of care and counseling regarding the diagnosis and prognosis. Questions are answered at this time and they are agreeable with the plan.      --------------------------------- IMPRESSION AND DISPOSITION ---------------------------------    IMPRESSION  1.  Rupture of right proximal biceps tendon, initial encounter        DISPOSITION  Disposition: Discharge to home  Patient condition is fair                  Santo Ignacio MD  06/02/20 1123 - - -

## 2022-11-28 ENCOUNTER — HOSPITAL ENCOUNTER (OUTPATIENT)
Age: 57
Discharge: HOME | End: 2022-11-28
Payer: COMMERCIAL

## 2022-11-28 PROCEDURE — 80307 DRUG TEST PRSMV CHEM ANLYZR: CPT

## 2022-11-29 LAB
AMPHETAMINE SCREEN, URINE: NOT DETECTED
BARBITURATE SCREEN URINE: NOT DETECTED
BENZODIAZEPINE SCREEN, URINE: NOT DETECTED
CANNABINOID SCREEN URINE: NOT DETECTED
COCAINE METABOLITE SCREEN URINE: NOT DETECTED
FENTANYL SCREEN, URINE: NOT DETECTED
Lab: ABNORMAL
METHADONE SCREEN, URINE: NOT DETECTED
OPIATE SCREEN URINE: POSITIVE
OXYCODONE URINE: NOT DETECTED
PHENCYCLIDINE SCREEN URINE: NOT DETECTED

## 2023-02-09 RX ORDER — GUAIFENESIN 600 MG/1
1200 TABLET, EXTENDED RELEASE ORAL PRN
COMMUNITY

## 2023-02-09 RX ORDER — HYDROCODONE BITARTRATE AND ACETAMINOPHEN 10; 325 MG/1; MG/1
1 TABLET ORAL EVERY 6 HOURS PRN
Status: ON HOLD | COMMUNITY
End: 2023-02-14 | Stop reason: HOSPADM

## 2023-02-09 NOTE — PROGRESS NOTES
[facility] PRE-ADMISSION TESTING INSTRUCTIONS    The Preadmission Testing patient is instructed accordingly using the following criteria (check applicable):    ARRIVAL INSTRUCTIONS:  [x] Parking the day of Surgery is located in the Main Entrance lot. Upon entering the door, make an immediate right to the surgery reception desk    [x] Bring photo ID and insurance card    [] Bring in a copy of Living will or Durable Power of  papers. [x] Please be sure to arrange for responsible adult to provide transportation to and from the hospital    [x] Please arrange for responsible adult to be with you for the 24 hour period post procedure due to having anesthesia    [x] If you awake am of surgery not feeling well or have temperature >100 please call [phone]    GENERAL INSTRUCTIONS:    [x] Nothing by mouth after midnight, including gum, candy, mints or water    [x] You may brush your teeth, but do not swallow any water    [x] Take medications as instructed with 1-2 oz of water    [] Stop herbal supplements and vitamins 5 days prior to procedure    [] Follow preop dosing of blood thinners per physician instructions    [] Take 1/2 dose of evening insulin, but no insulin after midnight    [] No oral diabetic medications after midnight    [] If diabetic and have low blood sugar or feel symptomatic, take 1-2oz apple juice only    [x] Bring inhalers day of surgery    [] Bring C-PAP/ Bi-Pap day of surgery    [] Bring urine specimen day of surgery    [x] Shower or bath with soap, lather and rinse well, AM of Surgery, no lotion, powders or creams to surgical site    [] Follow bowel prep as instructed per surgeon    [x] No tobacco products within 24 hours of surgery     [x] No alcohol or illegal drug use within 24 hours of surgery.     [x] Jewelry, body piercing's, eyeglasses, contact lenses and dentures are not permitted into surgery (bring cases)      [] Please do not wear any nail polish, make up or hair products on the day of surgery    [x] You can expect a call the business day prior to procedure to notify you if your arrival time changes    [x] If you receive a survey after surgery we would greatly appreciate your comments    [] Parent/guardian of a minor must accompany their child and remain on the premises  the entire time they are under our care     [] Pediatric patients may bring favorite toy, blanket or comfort item with them    [] A caregiver or family member must remain with the patient during their stay if they are mentally handicapped, have dementia, disoriented or unable to use a call light or would be a safety concern if left unattended    [x] Please notify surgeon if you develop any illness between now and time of surgery (cold, cough, sore throat, fever, nausea, vomiting) or any signs of infections  including skin, wounds, and dental.    [x]  The Outpatient Pharmacy is available to fill your prescription here on your day of surgery, ask your preop nurse for details    [] Other instructions    EDUCATIONAL MATERIALS PROVIDED:    [] PAT Preoperative Education Packet/Booklet     [] Medication List    [] Transfusion bracelet applied with instructions    [] Shower with soap, lather and rinse well, and use CHG wipes provided the evening before surgery as instructed    [] Incentive spirometer with instructions

## 2023-02-10 NOTE — H&P (VIEW-ONLY)
Date:   [facility]  Name: Denny Bill               : 1965 Sex: M  Age: 56 yrs  Acct#:  [id number]          Patient was referred by Lisa De La Cruz M.D..  Patient's primary care provider is Lisa De La Cruz M.D..    CHIEF COMPLAINT: Hernia    HPI:  44-year-old male referred for a hernia. He is a smoker who noticed a bulge in his left groin. Denies any GI symptoms. No previous abdominal surgery. He is up-to-date on colon cancer screening    Meds Prior to Visit:  Fox Ramos        Allergies:  NKDA    PMH:  Health Maintenance:  Colonoscopy - (2016)  (Medical Problems)  Surgical Hx:  Carpal Tunnel - (2018)  Lung Lobectomy  Reviewed and updated. SURGERIES:[ ]  FH:  Father:  Lung Cancer  . Mother:  Lung Cancer  . Reviewed and updated. [ ]  SH:  Personal Habits:  Tobacco Use: Patient is a current smoker, smokes every day. Alcohol: Denies alcohol use. Drug Use: Denies Drug Use. Daily Caffeine: Uses Caffeine. Reviewed and updated. [ ]    Date: 2023  Was the patient queried about smoking behavior? Yes  No  Does the patient currently smoke? Tobacco Use: Patient is a current smoker, smokes every day. [ ]  ROS:  Const: Denies anorexia, anxiety, fatigue, night sweats, weight gain and weight loss. Eyes: Denies eye symptoms. ENMT: Denies ear symptoms. Denies nasal symptoms. Denies mouth or throat symptoms. CV: Denies hypertension and other cardiovascular symptoms. Resp: Denies respiratory symptoms. GI: Denies hepatitis, liver disease and other gastrointestinal symptoms. Musculo: Denies musculoskeletal symptoms. Skin: Denies skin, hair and nail symptoms. Breast: Denies breast problems. Neuro: Denies neurologic symptoms. Psych: Denies depression and substance abuse. Endocrine: Denies diabetes, kidney disease and thyroid disease. Hema/Lymph: Denies anemia, blood disease, cancer and past transfusion.   Allergy/Immuno: Denies immunosuppression. Reviewed, no changes. [ ]    Ht: 70\" 5'10\" Wt: 190lb BMI: 27.3      CONSTITUTION:  Non-toxic, no acute distress[ ]  HEART: Regular rate and rhythm, no murmurs[ ]  LUNGS: Clear to auscultation bilaterally, no wheezes[ ]  ABDOMEN: soft, non-tender, non-distended, no masses or scars, reducible left inguinal hernia, no palpable right inguinal hernia  EXTREMETIES: warm and dry. No rash, cyanosis, edema or jaundice[ ]  [ ]     IMAGING: [ ]    LABS: [ ]        Assessment #1: Hx K40.90 Unilateral inguinal hernia, without obstruction or gangrene, not specified as recurrent   Care Plan:              Comments       :  Robotic left inguinal hernia repair with mesh          Time spent reviewing records, discussing findings, answering questions, reviewing laboratory studies, radiologic exams, and other diagnostics, and reviewing the diagnostic and therapeutic plan: 30 minutes    Voice recognition software was used in the preparation of this document. Despite all efforts to prevent them, transcription errors may have occurred.   Seen by:

## 2023-02-13 ENCOUNTER — ANESTHESIA EVENT (OUTPATIENT)
Dept: OPERATING ROOM | Age: 58
End: 2023-02-13
Payer: COMMERCIAL

## 2023-02-14 ENCOUNTER — HOSPITAL ENCOUNTER (OUTPATIENT)
Age: 58
Setting detail: OUTPATIENT SURGERY
Discharge: HOME | End: 2023-02-14
Attending: SURGERY | Admitting: SURGERY
Payer: COMMERCIAL

## 2023-02-14 ENCOUNTER — ANESTHESIA (OUTPATIENT)
Dept: OPERATING ROOM | Age: 58
End: 2023-02-14
Payer: COMMERCIAL

## 2023-02-14 VITALS
RESPIRATION RATE: 18 BRPM | HEART RATE: 74 BPM | TEMPERATURE: 97.9 F | BODY MASS INDEX: 27.35 KG/M2 | WEIGHT: 191 LBS | DIASTOLIC BLOOD PRESSURE: 72 MMHG | SYSTOLIC BLOOD PRESSURE: 144 MMHG | OXYGEN SATURATION: 94 % | HEIGHT: 70 IN

## 2023-02-14 DIAGNOSIS — K40.90 NON-RECURRENT UNILATERAL INGUINAL HERNIA WITHOUT OBSTRUCTION OR GANGRENE: Primary | ICD-10-CM

## 2023-02-14 LAB
EKG ATRIAL RATE: 74 BPM
EKG P AXIS: 29 DEGREES
EKG P-R INTERVAL: 176 MS
EKG Q-T INTERVAL: 402 MS
EKG QRS DURATION: 120 MS
EKG QTC CALCULATION (BAZETT): 446 MS
EKG R AXIS: 72 DEGREES
EKG T AXIS: 56 DEGREES
EKG VENTRICULAR RATE: 74 BPM
HCT VFR BLD CALC: 47.5 % (ref 37–54)
HEMOGLOBIN: 15.8 G/DL (ref 12.5–16.5)
MCH RBC QN AUTO: 29.6 PG (ref 26–35)
MCHC RBC AUTO-ENTMCNC: 33.3 % (ref 32–34.5)
MCV RBC AUTO: 89 FL (ref 80–99.9)
PDW BLD-RTO: 13.2 FL (ref 11.5–15)
PLATELET # BLD: 306 E9/L (ref 130–450)
PMV BLD AUTO: 9.3 FL (ref 7–12)
RBC # BLD: 5.34 E12/L (ref 3.8–5.8)
WBC # BLD: 11 E9/L (ref 4.5–11.5)

## 2023-02-14 PROCEDURE — C1781 MESH (IMPLANTABLE): HCPCS | Performed by: SURGERY

## 2023-02-14 PROCEDURE — 3700000000 HC ANESTHESIA ATTENDED CARE: Performed by: SURGERY

## 2023-02-14 PROCEDURE — 7100000000 HC PACU RECOVERY - FIRST 15 MIN: Performed by: SURGERY

## 2023-02-14 PROCEDURE — 2580000003 HC RX 258: Performed by: NURSE ANESTHETIST, CERTIFIED REGISTERED

## 2023-02-14 PROCEDURE — 3600000009 HC SURGERY ROBOT BASE: Performed by: SURGERY

## 2023-02-14 PROCEDURE — 6360000002 HC RX W HCPCS: Performed by: NURSE ANESTHETIST, CERTIFIED REGISTERED

## 2023-02-14 PROCEDURE — 7100000010 HC PHASE II RECOVERY - FIRST 15 MIN: Performed by: SURGERY

## 2023-02-14 PROCEDURE — 85027 COMPLETE CBC AUTOMATED: CPT

## 2023-02-14 PROCEDURE — 93005 ELECTROCARDIOGRAM TRACING: CPT

## 2023-02-14 PROCEDURE — 7100000001 HC PACU RECOVERY - ADDTL 15 MIN: Performed by: SURGERY

## 2023-02-14 PROCEDURE — 36415 COLL VENOUS BLD VENIPUNCTURE: CPT

## 2023-02-14 PROCEDURE — S2900 ROBOTIC SURGICAL SYSTEM: HCPCS | Performed by: SURGERY

## 2023-02-14 PROCEDURE — 7100000011 HC PHASE II RECOVERY - ADDTL 15 MIN: Performed by: SURGERY

## 2023-02-14 PROCEDURE — 2500000003 HC RX 250 WO HCPCS: Performed by: NURSE ANESTHETIST, CERTIFIED REGISTERED

## 2023-02-14 PROCEDURE — 2500000003 HC RX 250 WO HCPCS: Performed by: SURGERY

## 2023-02-14 PROCEDURE — 2709999900 HC NON-CHARGEABLE SUPPLY: Performed by: SURGERY

## 2023-02-14 PROCEDURE — 2580000003 HC RX 258: Performed by: SURGERY

## 2023-02-14 PROCEDURE — 6360000002 HC RX W HCPCS: Performed by: SURGERY

## 2023-02-14 PROCEDURE — 3700000001 HC ADD 15 MINUTES (ANESTHESIA): Performed by: SURGERY

## 2023-02-14 PROCEDURE — 6370000000 HC RX 637 (ALT 250 FOR IP): Performed by: ANESTHESIOLOGY

## 2023-02-14 PROCEDURE — 94640 AIRWAY INHALATION TREATMENT: CPT

## 2023-02-14 PROCEDURE — 3600000019 HC SURGERY ROBOT ADDTL 15MIN: Performed by: SURGERY

## 2023-02-14 DEVICE — LAPAROSCOPIC SELF-FIXATING MESH POLYESTER WITH POLYLACTIC ACID GRIPS AND COLLAGEN FILM
Type: IMPLANTABLE DEVICE | Site: ABDOMEN | Status: FUNCTIONAL
Brand: PROGRIP

## 2023-02-14 RX ORDER — IPRATROPIUM BROMIDE AND ALBUTEROL SULFATE 2.5; .5 MG/3ML; MG/3ML
1 SOLUTION RESPIRATORY (INHALATION) ONCE
Status: COMPLETED | OUTPATIENT
Start: 2023-02-14 | End: 2023-02-14

## 2023-02-14 RX ORDER — LIDOCAINE HYDROCHLORIDE 20 MG/ML
INJECTION, SOLUTION EPIDURAL; INFILTRATION; INTRACAUDAL; PERINEURAL PRN
Status: DISCONTINUED | OUTPATIENT
Start: 2023-02-14 | End: 2023-02-14 | Stop reason: SDUPTHER

## 2023-02-14 RX ORDER — DEXAMETHASONE SODIUM PHOSPHATE 4 MG/ML
INJECTION, SOLUTION INTRA-ARTICULAR; INTRALESIONAL; INTRAMUSCULAR; INTRAVENOUS; SOFT TISSUE PRN
Status: DISCONTINUED | OUTPATIENT
Start: 2023-02-14 | End: 2023-02-14 | Stop reason: SDUPTHER

## 2023-02-14 RX ORDER — ROCURONIUM BROMIDE 10 MG/ML
INJECTION, SOLUTION INTRAVENOUS PRN
Status: DISCONTINUED | OUTPATIENT
Start: 2023-02-14 | End: 2023-02-14 | Stop reason: SDUPTHER

## 2023-02-14 RX ORDER — PROPOFOL 10 MG/ML
INJECTION, EMULSION INTRAVENOUS PRN
Status: DISCONTINUED | OUTPATIENT
Start: 2023-02-14 | End: 2023-02-14 | Stop reason: SDUPTHER

## 2023-02-14 RX ORDER — SODIUM CHLORIDE, SODIUM LACTATE, POTASSIUM CHLORIDE, CALCIUM CHLORIDE 600; 310; 30; 20 MG/100ML; MG/100ML; MG/100ML; MG/100ML
INJECTION, SOLUTION INTRAVENOUS CONTINUOUS
Status: DISCONTINUED | OUTPATIENT
Start: 2023-02-14 | End: 2023-02-14 | Stop reason: HOSPADM

## 2023-02-14 RX ORDER — MIDAZOLAM HYDROCHLORIDE 1 MG/ML
INJECTION INTRAMUSCULAR; INTRAVENOUS PRN
Status: DISCONTINUED | OUTPATIENT
Start: 2023-02-14 | End: 2023-02-14 | Stop reason: SDUPTHER

## 2023-02-14 RX ORDER — SODIUM CHLORIDE 0.9 % (FLUSH) 0.9 %
5-40 SYRINGE (ML) INJECTION EVERY 12 HOURS SCHEDULED
Status: DISCONTINUED | OUTPATIENT
Start: 2023-02-14 | End: 2023-02-14 | Stop reason: HOSPADM

## 2023-02-14 RX ORDER — PROCHLORPERAZINE EDISYLATE 5 MG/ML
5 INJECTION INTRAMUSCULAR; INTRAVENOUS
Status: DISCONTINUED | OUTPATIENT
Start: 2023-02-14 | End: 2023-02-14 | Stop reason: HOSPADM

## 2023-02-14 RX ORDER — ONDANSETRON 2 MG/ML
INJECTION INTRAMUSCULAR; INTRAVENOUS PRN
Status: DISCONTINUED | OUTPATIENT
Start: 2023-02-14 | End: 2023-02-14 | Stop reason: SDUPTHER

## 2023-02-14 RX ORDER — SODIUM CHLORIDE 9 MG/ML
INJECTION, SOLUTION INTRAVENOUS CONTINUOUS PRN
Status: DISCONTINUED | OUTPATIENT
Start: 2023-02-14 | End: 2023-02-14 | Stop reason: SDUPTHER

## 2023-02-14 RX ORDER — OXYCODONE HYDROCHLORIDE AND ACETAMINOPHEN 5; 325 MG/1; MG/1
1 TABLET ORAL EVERY 6 HOURS PRN
Qty: 20 TABLET | Refills: 0 | Status: SHIPPED | OUTPATIENT
Start: 2023-02-14 | End: 2023-02-19

## 2023-02-14 RX ORDER — SODIUM CHLORIDE 9 MG/ML
INJECTION, SOLUTION INTRAVENOUS PRN
Status: DISCONTINUED | OUTPATIENT
Start: 2023-02-14 | End: 2023-02-14 | Stop reason: HOSPADM

## 2023-02-14 RX ORDER — SODIUM CHLORIDE 0.9 % (FLUSH) 0.9 %
5-40 SYRINGE (ML) INJECTION PRN
Status: DISCONTINUED | OUTPATIENT
Start: 2023-02-14 | End: 2023-02-14 | Stop reason: HOSPADM

## 2023-02-14 RX ORDER — OXYCODONE HYDROCHLORIDE AND ACETAMINOPHEN 5; 325 MG/1; MG/1
1 TABLET ORAL
Status: COMPLETED | OUTPATIENT
Start: 2023-02-14 | End: 2023-02-14

## 2023-02-14 RX ORDER — FENTANYL CITRATE 50 UG/ML
INJECTION, SOLUTION INTRAMUSCULAR; INTRAVENOUS PRN
Status: DISCONTINUED | OUTPATIENT
Start: 2023-02-14 | End: 2023-02-14 | Stop reason: SDUPTHER

## 2023-02-14 RX ADMIN — IPRATROPIUM BROMIDE AND ALBUTEROL SULFATE 1 AMPULE: 2.5; .5 SOLUTION RESPIRATORY (INHALATION) at 09:51

## 2023-02-14 RX ADMIN — MIDAZOLAM 2 MG: 1 INJECTION INTRAMUSCULAR; INTRAVENOUS at 08:09

## 2023-02-14 RX ADMIN — FENTANYL CITRATE 100 MCG: 50 INJECTION, SOLUTION INTRAMUSCULAR; INTRAVENOUS at 08:10

## 2023-02-14 RX ADMIN — WATER 2000 MG: 1 INJECTION INTRAMUSCULAR; INTRAVENOUS; SUBCUTANEOUS at 08:10

## 2023-02-14 RX ADMIN — LIDOCAINE HYDROCHLORIDE 100 MG: 20 INJECTION, SOLUTION EPIDURAL; INFILTRATION; INTRACAUDAL; PERINEURAL at 08:10

## 2023-02-14 RX ADMIN — SUGAMMADEX 200 MG: 100 INJECTION, SOLUTION INTRAVENOUS at 08:45

## 2023-02-14 RX ADMIN — ONDANSETRON 4 MG: 2 INJECTION INTRAMUSCULAR; INTRAVENOUS at 08:45

## 2023-02-14 RX ADMIN — DEXAMETHASONE SODIUM PHOSPHATE 10 MG: 4 INJECTION, SOLUTION INTRAMUSCULAR; INTRAVENOUS at 08:10

## 2023-02-14 RX ADMIN — ROCURONIUM BROMIDE 40 MG: 10 INJECTION, SOLUTION INTRAVENOUS at 08:10

## 2023-02-14 RX ADMIN — OXYCODONE AND ACETAMINOPHEN 1 TABLET: 5; 325 TABLET ORAL at 10:21

## 2023-02-14 RX ADMIN — SODIUM CHLORIDE: 9 INJECTION, SOLUTION INTRAVENOUS at 08:02

## 2023-02-14 RX ADMIN — PROPOFOL 200 MG: 10 INJECTION, EMULSION INTRAVENOUS at 08:10

## 2023-02-14 ASSESSMENT — PAIN DESCRIPTION - ORIENTATION
ORIENTATION: LEFT;LOWER
ORIENTATION: LEFT;LOWER

## 2023-02-14 ASSESSMENT — PAIN DESCRIPTION - FREQUENCY: FREQUENCY: CONTINUOUS

## 2023-02-14 ASSESSMENT — PAIN DESCRIPTION - LOCATION
LOCATION: ABDOMEN
LOCATION: ABDOMEN

## 2023-02-14 ASSESSMENT — PAIN DESCRIPTION - PAIN TYPE
TYPE: SURGICAL PAIN
TYPE: SURGICAL PAIN

## 2023-02-14 ASSESSMENT — PAIN SCALES - GENERAL
PAINLEVEL_OUTOF10: 3
PAINLEVEL_OUTOF10: 2
PAINLEVEL_OUTOF10: 0

## 2023-02-14 ASSESSMENT — LIFESTYLE VARIABLES: SMOKING_STATUS: 1

## 2023-02-14 ASSESSMENT — PAIN DESCRIPTION - ONSET: ONSET: ON-GOING

## 2023-02-14 NOTE — ANESTHESIA PRE PROCEDURE
Department of Anesthesiology  Preprocedure Note       Name:  Ofelia Banerjee. Age:  56 y.o.  :  1965                                          MRN:  [id number]         Date:  2023      Surgeon: Cinthia Garcia):  Lillian Szymanski MD    Procedure: Procedure(s):  LAPAROSCOPIC ROBOTIC XI ASSISTED LEFT INGUINAL HERNIA  REPAIR WITH MESH POSSIBLE OPEN    Medications prior to admission:   Prior to Admission medications    Medication Sig Start Date End Date Taking? Authorizing Provider   guaiFENesin (MUCINEX) 600 MG extended release tablet Take 1,200 mg by mouth as needed for Congestion   Yes Historical Provider, MD   HYDROcodone-acetaminophen (NORCO)  MG per tablet Take 1 tablet by mouth every 6 hours as needed for Pain.    Yes Historical Provider, MD   albuterol sulfate  (90 Base) MCG/ACT inhaler Inhale 2 puffs into the lungs 4 times daily as needed for Wheezing 21   Mechelle Shaw MD   INCRUSE ELLIPTA 62.5 MCG/INH AEPB INHALE 1 PUFF INTO THE LUNGS DAILY 21   Mechelle Shaw MD   acetaminophen (TYLENOL) 325 MG tablet Take 650 mg by mouth every 6 hours as needed for Pain    Historical Provider, MD   loratadine (CLARITIN) 10 MG capsule Take 10 mg by mouth daily as needed    Historical Provider, MD       Current medications:    Current Facility-Administered Medications   Medication Dose Route Frequency Provider Last Rate Last Admin    sodium chloride flush 0.9 % injection 5-40 mL  5-40 mL IntraVENous 2 times per day Lillian Szymanski MD        sodium chloride flush 0.9 % injection 5-40 mL  5-40 mL IntraVENous PRN Lillian Szymanski MD        lactated ringers IV soln infusion   IntraVENous Continuous Lillian Szymanski MD        ceFAZolin (ANCEF) 2,000 mg in sterile water 20 mL IV syringe  2,000 mg IntraVENous On Call to [address] [place] [address] MD Carli           Allergies:  No Known Allergies    Problem List:    Patient Active Problem List   Diagnosis Code    Mass of right lung R91.8    Chronic midline low back pain without sciatica M54.50, G89.29       Past Medical History:        Diagnosis Date    Arthritis     BACK- DDD    Back pain 1995    D/T TRAUMA FRACTURE BACK    COPD (chronic obstructive pulmonary disease) ([facility] [address]     DR OLIVER-    Full dentures     Hyperlipidemia     Left inguinal hernia        Past Surgical History:        Procedure Laterality Date    CARPAL TUNNEL RELEASE Bilateral 2018    2017.   DR Lucero Contreras    COLONOSCOPY  2016    EYE SURGERY  1980    DETACHED RETINA    ROTATOR CUFF REPAIR Right     SHOULDER ARTHROSCOPY Right 12/28/2020    RIGHT SHOULDER ARTHROSCOPY, SUBACROMIAL DECOMPRESSOIN, SUBSCPULARIS REPAIR, CORACOPLASTY performed by Julisa Mcmillan MD at [place] [address] Right 08/30/2019    BRONCH, ROBOTIC RIGHT VIDEO ASSISTED THORACOSCOPY, UPPER LOBE WEDGE RESECTION, POSSIBLE LOBECTOMY performed by Nieves Sullivan MD at [address] [place] [address]       Social History:    Social History     Tobacco Use    Smoking status: Every Day     Packs/day: 3.00     Years: 35.00     Pack years: 105.00     Types: Cigarettes    Smokeless tobacco: Never    Tobacco comments:     trying to quit down to pack a day from 4-5 packs   Substance Use Topics    Alcohol use: Never                                Ready to quit: Not Answered  Counseling given: Not Answered  Tobacco comments: trying to quit down to pack a day from 4-5 packs      Vital Signs (Current):   Vitals:    02/09/23 1603 02/14/23 0715   BP:  132/71   Pulse:  70   Resp:  16   Temp:  98.2 °F (36.8 °C)   TempSrc:  Tympanic   SpO2:  95%   Weight: 191 lb (86.6 kg)    Height: 5' 10\" (1.778 m)                                               BP Readings from Last 3 Encounters:   02/14/23 132/71   12/06/21 130/80   08/05/21 (!) 138/26       NPO Status: Time of last liquid consumption: 0000                        Time of last solid consumption: 0000                        Date of last liquid consumption: 02/14/23 Date of last solid food consumption: 02/14/23    BMI:   Wt Readings from Last 3 Encounters:   02/09/23 191 lb (86.6 kg)   04/13/22 200 lb (90.7 kg)   11/24/21 210 lb (95.3 kg)     Body mass index is 27.41 kg/m². CBC:   Lab Results   Component Value Date/Time    WBC 11.0 02/14/2023 07:15 AM    RBC 5.34 02/14/2023 07:15 AM    HGB 15.8 02/14/2023 07:15 AM    HCT 47.5 02/14/2023 07:15 AM    MCV 89.0 02/14/2023 07:15 AM    RDW 13.2 02/14/2023 07:15 AM     02/14/2023 07:15 AM       CMP:   Lab Results   Component Value Date/Time     08/13/2022 08:55 AM    K 4.6 08/13/2022 08:55 AM    K 4.8 08/28/2019 07:40 AM     08/13/2022 08:55 AM    CO2 24 08/13/2022 08:55 AM    BUN 14 08/13/2022 08:55 AM    CREATININE 1.0 08/13/2022 08:55 AM    GFRAA >60 08/13/2022 08:55 AM    LABGLOM >60 08/13/2022 08:55 AM    GLUCOSE 104 08/13/2022 08:55 AM    PROT 7.6 08/13/2022 08:55 AM    CALCIUM 9.5 08/13/2022 08:55 AM    BILITOT 0.4 08/13/2022 08:55 AM    ALKPHOS 69 08/13/2022 08:55 AM    AST 25 08/13/2022 08:55 AM    ALT 22 08/13/2022 08:55 AM       POC Tests: No results for input(s): POCGLU, POCNA, POCK, POCCL, POCBUN, POCHEMO, POCHCT in the last 72 hours.     Coags:   Lab Results   Component Value Date/Time    PROTIME 11.2 08/28/2019 07:40 AM    INR 1.0 08/28/2019 07:40 AM       HCG (If Applicable): No results found for: PREGTESTUR, PREGSERUM, HCG, HCGQUANT     ABGs: No results found for: PHART, PO2ART, UQO1LGR, SHR6YGF, BEART, F7QGUSEH     Type & Screen (If Applicable):  No results found for: LABABO, LABRH    Drug/Infectious Status (If Applicable):  No results found for: HIV, HEPCAB    COVID-19 Screening (If Applicable):   Lab Results   Component Value Date/Time    COVID19 Not Detected 12/23/2020 12:00 PM           Anesthesia Evaluation  Patient summary reviewed and Nursing notes reviewed no history of anesthetic complications:   Airway: Mallampati: II  TM distance: >3 FB   Neck ROM: full  Mouth opening: > = 3 FB Dental:    (+) edentulous      Pulmonary:normal exam    (+) COPD:  current smoker          Patient smoked on day of surgery. Cardiovascular:  Exercise tolerance: good (>4 METS),   (+) hyperlipidemia                  Neuro/Psych:   Negative Neuro/Psych ROS              GI/Hepatic/Renal: Neg GI/Hepatic/Renal ROS            Endo/Other: Negative Endo/Other ROS                    Abdominal:             Vascular: negative vascular ROS. Other Findings:           Anesthesia Plan      general     ASA 3       Induction: intravenous. MIPS: Postoperative opioids intended and Prophylactic antiemetics administered. Anesthetic plan and risks discussed with patient and child/children. Plan discussed with CRNA. Manjula Stevenson MD   2/14/2023      Chart reviewed . Patient assessed before induction. I agree with the above note.   Jigna Nguyen, APRN - CRNA      Miguel Izaguirre MD

## 2023-02-14 NOTE — LETTER
[address] [facility] [address]  Phone: [phone]    No name on file. February 14, 2023     Patient: Gillian Herman. YOB: 1965   Date of Visit: 1/16/2023       To Whom It May Concern: It is my medical opinion that Jami Hurt was cared for at [facility] ([facility]) his son Yuri Gamino was in accompaniment for continuity of care and mandatory safety protocols. If you have any questions or concerns, please don't hesitate to call. Sincerely,    Allyson Dent.  RUDDY

## 2023-02-14 NOTE — DISCHARGE INSTRUCTIONS
Follow all instructions carefully:      Restrictions: Do not drive a car or operate machinery while taking narcotic pain medication    Diet:    Resume usual       Medications:   Current Discharge Medication List             Details   oxyCODONE-acetaminophen (PERCOCET) 5-325 MG per tablet Take 1 tablet by mouth every 6 hours as needed for Pain for up to 5 days. Intended supply: 5 days. Take lowest dose possible to manage pain Max Daily Amount: 4 tablets  Qty: 20 tablet, Refills: 0    Comments: Reduce doses taken as pain becomes manageable  Associated Diagnoses: Non-recurrent unilateral inguinal hernia without obstruction or gangrene                Details   guaiFENesin (MUCINEX) 600 MG extended release tablet Take 1,200 mg by mouth as needed for Congestion      albuterol sulfate  (90 Base) MCG/ACT inhaler Inhale 2 puffs into the lungs 4 times daily as needed for Wheezing  Qty: 1 each, Refills: 5    Associated Diagnoses: Chronic obstructive pulmonary disease, unspecified COPD type ([address] Wheezing      INCRUSE ELLIPTA 62.5 MCG/INH AEPB INHALE 1 PUFF INTO THE LUNGS DAILY  Qty: 7 each, Refills: 13    Associated Diagnoses: Chronic obstructive pulmonary disease, unspecified COPD type ([facility])      acetaminophen (TYLENOL) 325 MG tablet Take 650 mg by mouth every 6 hours as needed for Pain      loratadine (CLARITIN) 10 MG capsule Take 10 mg by mouth daily as needed                May resume all home medications    OK to shower but no tub bathing or swimming    No lifting objects > 15 lbs for 2  weeks    Expect some swelling and bruising of the groin, scrotum, and penis         Follow up Care: Follow up with Dr Herminia Winston in 2 weeks  [address]  [phone]       If problems or questions arise call the physician, If unable to reach your physician call [facility] Emergency Room.     [employer] [facility] [phone] ([phone] [facility] [phone]

## 2023-02-14 NOTE — OP NOTE
PREOPERATIVE DIAGNOSIS: Left inguinal hernia. POSTOPERATIVE DIAGNOSIS:  same     PROCEDURE PERFORMED:  Laparoscopic robotic-assisted left inguinal  hernia repair with mesh. SURGEON:  Rufina Lawson MD.     ANESTHESIA:  General.     SPECIMENS:  None. COMPLICATIONS:  None. EBL:  5 ml     CLINICAL NOTE: 55year old male with a reducible left inguinal hernia. Recommended robotic repair. Risks, benefits, and alternatives were discussed with the patient. DESCRIPTION OF PROCEDURE:  The patient was taken to the operating room  and placed on the operating table in the supine position. General  anesthesia was administered. Both arms were tucked. The abdomen was  clipped. The abdomen was prepped and draped in the usual sterile fashion. I tented the abdominal wall upwards, introduced a Veress needle through the umbilicus, confirmed placement with a drip test.  The abdomen was insufflated to 15 mmHg. The Veress needle was removed. An 8-mm incision was made in the epigastrium and a  robot port was introduced. The robotic camera was inserted. Two  additional robotic ports were placed under direct vision on either side  of the robotic camera port about 10 cm in distance, one on the left and  one on the right. The patient was placed in Trendelenburg position. The abdomen was inspected. There was a indirect inguinal hernia on the left. There was no evidence of a hernia on the right. Using a  ProGrasp forceps on the left arm and a suture cut needle on the right  arm, I opened up a flap of peritoneum extending from the  anterior-superior iliac spine on the left medially through the median  umbilical ligament and then bluntly dissected the peritoneal flap  posteriorly. The flap was dissected down to the psoas muscle laterally. Medially, vasu's ligament was exposed and the bladder was dissected downward.    The hernia sac was dissected off of the cord structures, preserving the testicular vessels and the vas deferens. Eventually, the flap of peritoneum was further dissected posteriorly in order to  accommodate mesh. A piece of ProGrip mesh was inserted and properly  positioned on the inguinal floor. Once the mesh was in position, the  peritoneal flap defect was reapproximated with a running 2-0 absorbable  barbed suture. All needle counts were correct. The robot was undocked. The ports were removed and the abdomen was deflated. The patient was  placed in a level position. Local anesthetic was injected. The skin  incisions were closed with 4-0 Vicryl sutures and Dermabond.            Christal Virk MD

## 2023-02-14 NOTE — ANESTHESIA POSTPROCEDURE EVALUATION
Department of Anesthesiology  Postprocedure Note    Patient: Maribeth Calvin   MRN: [id number]  YOB: 1965  Date of evaluation: 2/14/2023      Procedure Summary     Date: 02/14/23 Room / Location: [facility] OR 10 / [facility]    Anesthesia Start: 8181 Anesthesia Stop: 8914    Procedure: LAPAROSCOPIC ROBOTIC XI ASSISTED LEFT INGUINAL HERNIA  REPAIR WITH MESH (Left: Abdomen) Diagnosis:       Unilateral inguinal hernia without obstruction or gangrene, recurrence not specified      (Unilateral inguinal hernia without obstruction or gangrene, recurrence not specified [K40.90])    Surgeons: Carollee Kehr, MD Responsible Provider: Trevor Moran MD    Anesthesia Type: General ASA Status: 3          Anesthesia Type: General    Kylee Phase I: Kylee Score: 9    Kylee Phase II: Kylee Score: 10      Anesthesia Post Evaluation    Patient location during evaluation: PACU  Patient participation: complete - patient participated  Level of consciousness: awake and alert  Pain score: 3  Airway patency: patent  Nausea & Vomiting: no vomiting and no nausea  Complications: no  Cardiovascular status: hemodynamically stable  Respiratory status: spontaneous ventilation  Hydration status: stable

## 2023-02-14 NOTE — INTERVAL H&P NOTE
Update History & Physical    The patient's History and Physical of January 16, 2023 was reviewed with the patient and I examined the patient. There was no change. The surgical site was confirmed by the patient and me. Plan: The risks, benefits, expected outcome, and alternative to the recommended procedure have been discussed with the patient. Patient understands and wants to proceed with the procedure.      Electronically signed by Yury Johns MD on 2/14/2023 at 6:37 AM

## 2023-08-19 ENCOUNTER — HOSPITAL ENCOUNTER (OUTPATIENT)
Age: 58
Discharge: HOME | End: 2023-08-19
Payer: COMMERCIAL

## 2023-08-19 LAB
25(OH)D3 SERPL-MCNC: 26.5 NG/ML (ref 30–100)
ALBUMIN SERPL-MCNC: 4.3 G/DL (ref 3.5–5.2)
ALP SERPL-CCNC: 72 U/L (ref 40–129)
ALT SERPL-CCNC: 17 U/L (ref 0–40)
ANION GAP SERPL CALCULATED.3IONS-SCNC: 10 MMOL/L (ref 7–16)
AST SERPL-CCNC: 19 U/L (ref 0–39)
BASOPHILS # BLD: 0.1 K/UL (ref 0–0.2)
BASOPHILS NFR BLD: 1 % (ref 0–2)
BILIRUB SERPL-MCNC: 0.4 MG/DL (ref 0–1.2)
BILIRUB UR QL STRIP: NEGATIVE
BUN SERPL-MCNC: 13 MG/DL (ref 6–20)
CALCIUM SERPL-MCNC: 9.6 MG/DL (ref 8.6–10.2)
CHLORIDE SERPL-SCNC: 102 MMOL/L (ref 98–107)
CHOLEST SERPL-MCNC: 178 MG/DL
CLARITY UR: CLEAR
CO2 SERPL-SCNC: 26 MMOL/L (ref 22–29)
COLOR UR: YELLOW
CREAT SERPL-MCNC: 0.9 MG/DL (ref 0.7–1.2)
EOSINOPHIL # BLD: 0.39 K/UL (ref 0.05–0.5)
EOSINOPHILS RELATIVE PERCENT: 4 % (ref 0–6)
ERYTHROCYTE [DISTWIDTH] IN BLOOD BY AUTOMATED COUNT: 13.5 % (ref 11.5–15)
GFR SERPL CREATININE-BSD FRML MDRD: >60 ML/MIN/1.73M2
GLUCOSE SERPL-MCNC: 113 MG/DL (ref 74–99)
GLUCOSE UR STRIP-MCNC: NEGATIVE MG/DL
HCT VFR BLD AUTO: 48.1 % (ref 37–54)
HDLC SERPL-MCNC: 33 MG/DL
HGB BLD-MCNC: 16.6 G/DL (ref 12.5–16.5)
HGB UR QL STRIP.AUTO: ABNORMAL
IMM GRANULOCYTES # BLD AUTO: <0.03 K/UL (ref 0–0.58)
IMM GRANULOCYTES NFR BLD: 0 % (ref 0–5)
KETONES UR STRIP-MCNC: NEGATIVE MG/DL
LDLC SERPL CALC-MCNC: 114 MG/DL
LEUKOCYTE ESTERASE UR QL STRIP: NEGATIVE
LYMPHOCYTES NFR BLD: 2.95 K/UL (ref 1.5–4)
LYMPHOCYTES RELATIVE PERCENT: 27 % (ref 20–42)
MCH RBC QN AUTO: 30.1 PG (ref 26–35)
MCHC RBC AUTO-ENTMCNC: 34.5 G/DL (ref 32–34.5)
MCV RBC AUTO: 87.3 FL (ref 80–99.9)
MONOCYTES NFR BLD: 0.72 K/UL (ref 0.1–0.95)
MONOCYTES NFR BLD: 7 % (ref 2–12)
NEUTROPHILS NFR BLD: 62 % (ref 43–80)
NEUTS SEG NFR BLD: 6.87 K/UL (ref 1.8–7.3)
NITRITE UR QL STRIP: NEGATIVE
PH UR STRIP: 5.5 [PH] (ref 5–9)
PLATELET # BLD AUTO: 226 K/UL (ref 130–450)
PMV BLD AUTO: 9.1 FL (ref 7–12)
POTASSIUM SERPL-SCNC: 4.3 MMOL/L (ref 3.5–5)
PROT SERPL-MCNC: 7.5 G/DL (ref 6.4–8.3)
PROT UR STRIP-MCNC: 100 MG/DL
PSA SERPL-MCNC: 1.87 NG/ML (ref 0–4)
RBC # BLD AUTO: 5.51 M/UL (ref 3.8–5.8)
RBC #/AREA URNS HPF: NORMAL /HPF
SODIUM SERPL-SCNC: 138 MMOL/L (ref 132–146)
SP GR UR STRIP: 1.02 (ref 1–1.03)
TRIGL SERPL-MCNC: 156 MG/DL
UROBILINOGEN UR STRIP-ACNC: 0.2 EU/DL (ref 0–1)
VLDLC SERPL CALC-MCNC: 31 MG/DL
WBC #/AREA URNS HPF: NORMAL /HPF
WBC OTHER # BLD: 11.1 K/UL (ref 4.5–11.5)

## 2023-08-19 PROCEDURE — 84153 ASSAY OF PSA TOTAL: CPT

## 2023-08-19 PROCEDURE — 82306 VITAMIN D 25 HYDROXY: CPT

## 2023-08-19 PROCEDURE — 85025 COMPLETE CBC W/AUTO DIFF WBC: CPT

## 2023-08-19 PROCEDURE — 81001 URINALYSIS AUTO W/SCOPE: CPT

## 2023-08-19 PROCEDURE — 80053 COMPREHEN METABOLIC PANEL: CPT

## 2023-08-19 PROCEDURE — 80061 LIPID PANEL: CPT

## 2023-08-19 PROCEDURE — 36415 COLL VENOUS BLD VENIPUNCTURE: CPT

## 2023-10-03 ENCOUNTER — HOSPITAL ENCOUNTER (OUTPATIENT)
Age: 58
Discharge: HOME | End: 2023-10-03
Payer: COMMERCIAL

## 2023-10-03 LAB
BUN SERPL-MCNC: 13 MG/DL (ref 6–20)
CREAT SERPL-MCNC: 0.9 MG/DL (ref 0.7–1.2)
GFR SERPL CREATININE-BSD FRML MDRD: >60 ML/MIN/1.73M2

## 2023-10-03 PROCEDURE — 84520 ASSAY OF UREA NITROGEN: CPT

## 2023-10-03 PROCEDURE — 36415 COLL VENOUS BLD VENIPUNCTURE: CPT

## 2023-10-03 PROCEDURE — 82565 ASSAY OF CREATININE: CPT

## 2023-10-05 ENCOUNTER — HOSPITAL ENCOUNTER (OUTPATIENT)
Dept: CT IMAGING | Age: 58
Discharge: HOME | End: 2023-10-07
Payer: COMMERCIAL

## 2023-10-05 DIAGNOSIS — F17.200 TOBACCO USE DISORDER: ICD-10-CM

## 2023-10-05 DIAGNOSIS — R91.1 COIN LESION: ICD-10-CM

## 2023-10-05 DIAGNOSIS — R31.29 HEMATURIA, MICROSCOPIC: ICD-10-CM

## 2023-10-05 PROCEDURE — 71250 CT THORAX DX C-: CPT

## 2023-10-05 PROCEDURE — 6360000004 HC RX CONTRAST MEDICATION: Performed by: STUDENT IN AN ORGANIZED HEALTH CARE EDUCATION/TRAINING PROGRAM

## 2023-10-05 PROCEDURE — 74178 CT ABD&PLV WO CNTR FLWD CNTR: CPT | Performed by: NURSE PRACTITIONER

## 2023-10-05 RX ADMIN — IOPAMIDOL 120 ML: 755 INJECTION, SOLUTION INTRAVENOUS at 19:23

## 2023-10-06 ENCOUNTER — TRANSCRIBE ORDERS (OUTPATIENT)
Dept: ADMINISTRATIVE | Age: 58
End: 2023-10-06

## 2023-12-27 ENCOUNTER — HOSPITAL ENCOUNTER (EMERGENCY)
Age: 58
Discharge: HOME | End: 2023-12-27

## 2023-12-27 VITALS
DIASTOLIC BLOOD PRESSURE: 70 MMHG | TEMPERATURE: 98.9 F | HEART RATE: 70 BPM | OXYGEN SATURATION: 93 % | SYSTOLIC BLOOD PRESSURE: 125 MMHG | RESPIRATION RATE: 14 BRPM

## 2024-01-27 ENCOUNTER — HOSPITAL ENCOUNTER (OUTPATIENT)
Age: 59
Discharge: HOME | End: 2024-01-27
Payer: COMMERCIAL

## 2024-01-27 LAB
ALBUMIN SERPL-MCNC: 4.2 G/DL (ref 3.5–5.2)
ALP SERPL-CCNC: 69 U/L (ref 40–129)
ALT SERPL-CCNC: 31 U/L (ref 0–40)
ANION GAP SERPL CALCULATED.3IONS-SCNC: 10 MMOL/L (ref 7–16)
AST SERPL-CCNC: 23 U/L (ref 0–39)
BILIRUB SERPL-MCNC: 0.5 MG/DL (ref 0–1.2)
BUN SERPL-MCNC: 15 MG/DL (ref 6–20)
CALCIUM SERPL-MCNC: 9.5 MG/DL (ref 8.6–10.2)
CHLORIDE SERPL-SCNC: 103 MMOL/L (ref 98–107)
CHOLEST SERPL-MCNC: 148 MG/DL
CO2 SERPL-SCNC: 27 MMOL/L (ref 22–29)
CREAT SERPL-MCNC: 0.9 MG/DL (ref 0.7–1.2)
GFR SERPL CREATININE-BSD FRML MDRD: >60 ML/MIN/1.73M2
GLUCOSE SERPL-MCNC: 100 MG/DL (ref 74–99)
HDLC SERPL-MCNC: 32 MG/DL
LDLC SERPL CALC-MCNC: 95 MG/DL
POTASSIUM SERPL-SCNC: 4.5 MMOL/L (ref 3.5–5)
PROT SERPL-MCNC: 7.6 G/DL (ref 6.4–8.3)
SODIUM SERPL-SCNC: 140 MMOL/L (ref 132–146)
TRIGL SERPL-MCNC: 104 MG/DL
VLDLC SERPL CALC-MCNC: 21 MG/DL

## 2024-01-27 PROCEDURE — 80053 COMPREHEN METABOLIC PANEL: CPT

## 2024-01-27 PROCEDURE — 80061 LIPID PANEL: CPT

## 2024-01-27 PROCEDURE — 36415 COLL VENOUS BLD VENIPUNCTURE: CPT

## 2024-05-16 ENCOUNTER — TELEPHONE (OUTPATIENT)
Dept: VASCULAR SURGERY | Age: 59
End: 2024-05-16

## 2024-05-16 NOTE — TELEPHONE ENCOUNTER
Received referral from Dr. Khoury for Dr. Nelson regarding varicose veins, left message for patient to return call to schedule.

## 2024-05-20 ENCOUNTER — TELEPHONE (OUTPATIENT)
Dept: VASCULAR SURGERY | Age: 59
End: 2024-05-20

## 2024-05-20 NOTE — TELEPHONE ENCOUNTER
Second Attempt:  Received referral from Dr. Khoury for Dr. Nelson regarding varicose veins, left message for patient to return call to schedule

## 2024-05-23 ENCOUNTER — TELEPHONE (OUTPATIENT)
Dept: VASCULAR SURGERY | Age: 59
End: 2024-05-23

## 2024-05-23 NOTE — TELEPHONE ENCOUNTER
Third Attempt:  Received referral from Dr. Khoury for Dr. Nelson regarding varicose veins, left message for patient to return call to schedule.  Will send back to Dr. Khoury.

## 2024-09-07 ENCOUNTER — HOSPITAL ENCOUNTER (OUTPATIENT)
Age: 59
Discharge: HOME | End: 2024-09-07
Payer: COMMERCIAL

## 2024-09-07 LAB
25(OH)D3 SERPL-MCNC: 29.1 NG/ML (ref 30–100)
ALBUMIN SERPL-MCNC: 4.4 G/DL (ref 3.5–5.2)
ALP SERPL-CCNC: 67 U/L (ref 40–129)
ALT SERPL-CCNC: 19 U/L (ref 0–40)
ANION GAP SERPL CALCULATED.3IONS-SCNC: 12 MMOL/L (ref 7–16)
AST SERPL-CCNC: 21 U/L (ref 0–39)
BASOPHILS # BLD: 0.07 K/UL (ref 0–0.2)
BASOPHILS NFR BLD: 1 % (ref 0–2)
BILIRUB SERPL-MCNC: 0.5 MG/DL (ref 0–1.2)
BILIRUB UR QL STRIP: NEGATIVE
BUN SERPL-MCNC: 16 MG/DL (ref 6–20)
CALCIUM SERPL-MCNC: 9.7 MG/DL (ref 8.6–10.2)
CHLORIDE SERPL-SCNC: 102 MMOL/L (ref 98–107)
CHOLEST SERPL-MCNC: 193 MG/DL
CLARITY UR: CLEAR
CO2 SERPL-SCNC: 26 MMOL/L (ref 22–29)
COLOR UR: YELLOW
CREAT SERPL-MCNC: 0.9 MG/DL (ref 0.7–1.2)
EOSINOPHIL # BLD: 0.3 K/UL (ref 0.05–0.5)
EOSINOPHILS RELATIVE PERCENT: 4 % (ref 0–6)
ERYTHROCYTE [DISTWIDTH] IN BLOOD BY AUTOMATED COUNT: 13.4 % (ref 11.5–15)
GFR, ESTIMATED: >90 ML/MIN/1.73M2
GLUCOSE SERPL-MCNC: 100 MG/DL (ref 74–99)
GLUCOSE UR STRIP-MCNC: NEGATIVE MG/DL
HCT VFR BLD AUTO: 49.6 % (ref 37–54)
HDLC SERPL-MCNC: 33 MG/DL
HGB BLD-MCNC: 16.4 G/DL (ref 12.5–16.5)
HGB UR QL STRIP.AUTO: ABNORMAL
IMM GRANULOCYTES # BLD AUTO: <0.03 K/UL (ref 0–0.58)
IMM GRANULOCYTES NFR BLD: 0 % (ref 0–5)
KETONES UR STRIP-MCNC: NEGATIVE MG/DL
LDLC SERPL CALC-MCNC: 121 MG/DL
LEUKOCYTE ESTERASE UR QL STRIP: NEGATIVE
LYMPHOCYTES NFR BLD: 2.92 K/UL (ref 1.5–4)
LYMPHOCYTES RELATIVE PERCENT: 35 % (ref 20–42)
MCH RBC QN AUTO: 28.5 PG (ref 26–35)
MCHC RBC AUTO-ENTMCNC: 33.1 G/DL (ref 32–34.5)
MCV RBC AUTO: 86.1 FL (ref 80–99.9)
MONOCYTES NFR BLD: 0.77 K/UL (ref 0.1–0.95)
MONOCYTES NFR BLD: 9 % (ref 2–12)
NEUTROPHILS NFR BLD: 51 % (ref 43–80)
NEUTS SEG NFR BLD: 4.17 K/UL (ref 1.8–7.3)
NITRITE UR QL STRIP: NEGATIVE
PH UR STRIP: 6 [PH] (ref 5–9)
PLATELET # BLD AUTO: 251 K/UL (ref 130–450)
PMV BLD AUTO: 9.2 FL (ref 7–12)
POTASSIUM SERPL-SCNC: 4.8 MMOL/L (ref 3.5–5)
PROT SERPL-MCNC: 7.6 G/DL (ref 6.4–8.3)
PROT UR STRIP-MCNC: ABNORMAL MG/DL
PSA SERPL-MCNC: 2.08 NG/ML (ref 0–4)
RBC # BLD AUTO: 5.76 M/UL (ref 3.8–5.8)
RBC #/AREA URNS HPF: ABNORMAL /HPF
SODIUM SERPL-SCNC: 140 MMOL/L (ref 132–146)
SP GR UR STRIP: 1.02 (ref 1–1.03)
TRIGL SERPL-MCNC: 193 MG/DL
UROBILINOGEN UR STRIP-ACNC: 0.2 EU/DL (ref 0–1)
VLDLC SERPL CALC-MCNC: 39 MG/DL
WBC #/AREA URNS HPF: ABNORMAL /HPF
WBC OTHER # BLD: 8.3 K/UL (ref 4.5–11.5)

## 2024-09-07 PROCEDURE — 82306 VITAMIN D 25 HYDROXY: CPT

## 2024-09-07 PROCEDURE — 80053 COMPREHEN METABOLIC PANEL: CPT

## 2024-09-07 PROCEDURE — 85025 COMPLETE CBC W/AUTO DIFF WBC: CPT

## 2024-09-07 PROCEDURE — 80061 LIPID PANEL: CPT

## 2024-09-07 PROCEDURE — 81001 URINALYSIS AUTO W/SCOPE: CPT

## 2024-09-07 PROCEDURE — 84153 ASSAY OF PSA TOTAL: CPT

## 2024-09-07 PROCEDURE — 36415 COLL VENOUS BLD VENIPUNCTURE: CPT

## 2025-05-22 ENCOUNTER — TRANSCRIBE ORDERS (OUTPATIENT)
Dept: ADMINISTRATIVE | Age: 60
End: 2025-05-22

## 2025-05-22 DIAGNOSIS — M47.816 SPONDYLOSIS OF LUMBAR REGION WITHOUT MYELOPATHY OR RADICULOPATHY: Primary | ICD-10-CM

## 2025-05-22 DIAGNOSIS — M54.16 RADICULOPATHY, LUMBAR REGION: ICD-10-CM

## 2025-06-28 ENCOUNTER — HOSPITAL ENCOUNTER (OUTPATIENT)
Dept: MRI IMAGING | Age: 60
Discharge: HOME | End: 2025-06-30
Payer: COMMERCIAL

## 2025-06-28 DIAGNOSIS — M54.16 RADICULOPATHY, LUMBAR REGION: ICD-10-CM

## 2025-06-28 DIAGNOSIS — M47.816 SPONDYLOSIS OF LUMBAR REGION WITHOUT MYELOPATHY OR RADICULOPATHY: ICD-10-CM

## 2025-06-28 PROCEDURE — 72148 MRI LUMBAR SPINE W/O DYE: CPT

## 2025-09-06 ENCOUNTER — HOSPITAL ENCOUNTER (OUTPATIENT)
Dept: LAB | Age: 60
Discharge: HOME | End: 2025-09-06
Payer: COMMERCIAL

## 2025-09-06 LAB
25(OH)D3 SERPL-MCNC: 46.3 NG/ML (ref 30–100)
ALBUMIN SERPL-MCNC: 4.5 G/DL (ref 3.5–5.2)
ALP SERPL-CCNC: 69 U/L (ref 40–129)
ALT SERPL-CCNC: 18 U/L (ref 0–50)
ANION GAP SERPL CALCULATED.3IONS-SCNC: 11 MMOL/L (ref 7–16)
AST SERPL-CCNC: 23 U/L (ref 0–50)
BASOPHILS # BLD: 0.1 K/UL (ref 0–0.2)
BASOPHILS NFR BLD: 1 % (ref 0–2)
BILIRUB SERPL-MCNC: 0.5 MG/DL (ref 0–1.2)
BILIRUB UR QL STRIP: NEGATIVE
BUN SERPL-MCNC: 13 MG/DL (ref 6–20)
CALCIUM SERPL-MCNC: 9.5 MG/DL (ref 8.6–10)
CHLORIDE SERPL-SCNC: 103 MMOL/L (ref 98–107)
CHOLEST SERPL-MCNC: 156 MG/DL
CLARITY UR: CLEAR
CO2 SERPL-SCNC: 26 MMOL/L (ref 22–29)
COLOR UR: YELLOW
CREAT SERPL-MCNC: 0.7 MG/DL (ref 0.7–1.2)
EOSINOPHIL # BLD: 0.38 K/UL (ref 0.05–0.5)
EOSINOPHILS RELATIVE PERCENT: 4 % (ref 0–6)
ERYTHROCYTE [DISTWIDTH] IN BLOOD BY AUTOMATED COUNT: 13.4 % (ref 11.5–15)
GFR, ESTIMATED: >90 ML/MIN/1.73M2
GLUCOSE SERPL-MCNC: 104 MG/DL (ref 74–99)
GLUCOSE UR STRIP-MCNC: NEGATIVE MG/DL
HCT VFR BLD AUTO: 48.1 % (ref 37–54)
HDLC SERPL-MCNC: 34 MG/DL
HGB BLD-MCNC: 16.7 G/DL (ref 12.5–16.5)
HGB UR QL STRIP.AUTO: ABNORMAL
IMM GRANULOCYTES # BLD AUTO: <0.03 K/UL (ref 0–0.58)
IMM GRANULOCYTES NFR BLD: 0 % (ref 0–5)
KETONES UR STRIP-MCNC: NEGATIVE MG/DL
LDLC SERPL CALC-MCNC: 94 MG/DL
LEUKOCYTE ESTERASE UR QL STRIP: NEGATIVE
LYMPHOCYTES NFR BLD: 3.11 K/UL (ref 1.5–4)
LYMPHOCYTES RELATIVE PERCENT: 29 % (ref 20–42)
MCH RBC QN AUTO: 30.1 PG (ref 26–35)
MCHC RBC AUTO-ENTMCNC: 34.7 G/DL (ref 32–34.5)
MCV RBC AUTO: 86.8 FL (ref 80–99.9)
MONOCYTES NFR BLD: 0.81 K/UL (ref 0.1–0.95)
MONOCYTES NFR BLD: 8 % (ref 2–12)
NEUTROPHILS NFR BLD: 59 % (ref 43–80)
NEUTS SEG NFR BLD: 6.36 K/UL (ref 1.8–7.3)
NITRITE UR QL STRIP: NEGATIVE
PH UR STRIP: 7 [PH] (ref 5–8)
PLATELET # BLD AUTO: 247 K/UL (ref 130–450)
PMV BLD AUTO: 8.9 FL (ref 7–12)
POTASSIUM SERPL-SCNC: 4.2 MMOL/L (ref 3.5–5.1)
PROT SERPL-MCNC: 7.4 G/DL (ref 6.4–8.3)
PROT UR STRIP-MCNC: 30 MG/DL
PSA SERPL-MCNC: 2.69 NG/ML (ref 0–4)
RBC # BLD AUTO: 5.54 M/UL (ref 3.8–5.8)
RBC #/AREA URNS HPF: NORMAL /HPF
SODIUM SERPL-SCNC: 139 MMOL/L (ref 136–145)
SP GR UR STRIP: 1.01 (ref 1–1.03)
TRIGL SERPL-MCNC: 140 MG/DL
UROBILINOGEN UR STRIP-ACNC: 0.2 EU/DL (ref 0–1)
VLDLC SERPL CALC-MCNC: 28 MG/DL
WBC #/AREA URNS HPF: NORMAL /HPF
WBC OTHER # BLD: 10.8 K/UL (ref 4.5–11.5)

## 2025-09-06 PROCEDURE — 80061 LIPID PANEL: CPT

## 2025-09-06 PROCEDURE — 80053 COMPREHEN METABOLIC PANEL: CPT

## 2025-09-06 PROCEDURE — 36415 COLL VENOUS BLD VENIPUNCTURE: CPT

## 2025-09-06 PROCEDURE — 85025 COMPLETE CBC W/AUTO DIFF WBC: CPT

## 2025-09-06 PROCEDURE — 84153 ASSAY OF PSA TOTAL: CPT

## 2025-09-06 PROCEDURE — 82306 VITAMIN D 25 HYDROXY: CPT

## 2025-09-06 PROCEDURE — 81001 URINALYSIS AUTO W/SCOPE: CPT

## (undated) DEVICE — SHOECOVER ANTI-SKID: Brand: CARDINAL HEALTH

## (undated) DEVICE — Device

## (undated) DEVICE — SUPER TURBOVAC 90 INTEGRATED CABLE WAND ICW: Brand: COBLATION

## (undated) DEVICE — ARM DRAPE

## (undated) DEVICE — INTENDED FOR TISSUE SEPARATION, AND OTHER PROCEDURES THAT REQUIRE A SHARP SURGICAL BLADE TO PUNCTURE OR CUT.: Brand: BARD-PARKER ® STAINLESS STEEL BLADES

## (undated) DEVICE — GOWN,SIRUS,FABRNF,XL,20/CS: Brand: MEDLINE

## (undated) DEVICE — GLOVE SURG SZ 8 CRM LTX FREE POLYISOPRENE POLYMER BEAD ANTI

## (undated) DEVICE — SUTURE TIGERTAPE TIGERWIRE SZ 2-0 L30IN NONABSORBABLE AR72377T

## (undated) DEVICE — DRAPE,U/ SHT,SPLIT,PLAS,STERIL: Brand: MEDLINE

## (undated) DEVICE — TOWEL,OR,DSP,ST,BLUE,STD,6/PK,12PK/CS: Brand: MEDLINE

## (undated) DEVICE — PACK,SHOULDER SPLIT: Brand: MEDLINE

## (undated) DEVICE — SLEEVE TRAC SPANDEX LAT W/ 4IN COBAN SUPERFICIAL RAD NRV PD

## (undated) DEVICE — KIT SURG W7XL11IN 2 PKT UNTREATED NA

## (undated) DEVICE — SKIN AFFIX SURG ADHESIVE 72/CS 0.55ML: Brand: MEDLINE

## (undated) DEVICE — ALCOHOL RUBBING ISO 16OZ 70%

## (undated) DEVICE — INSUFFLATION NEEDLE TO ESTABLISH PNEUMOPERITONEUM.: Brand: INSUFFLATION NEEDLE

## (undated) DEVICE — GLOVE ORANGE PI 7 1/2   MSG9075

## (undated) DEVICE — DOUBLE BASIN SET: Brand: MEDLINE INDUSTRIES, INC.

## (undated) DEVICE — 3M™ STERI-DRAPE™ INCISE DRAPE 1050 (60CM X 45CM): Brand: STERI-DRAPE™

## (undated) DEVICE — CANNULA STAPLER ENDOWRIST 12MM

## (undated) DEVICE — PAD,NON-ADHERENT,3X8,STERILE,LF,1/PK: Brand: MEDLINE

## (undated) DEVICE — GLOVE SURG SZ 6 THK91MIL LTX FREE SYN POLYISOPRENE ANTI

## (undated) DEVICE — CHLORAPREP 26ML ORANGE

## (undated) DEVICE — GOWN,SIRUS,FABRNF,L,20/CS: Brand: MEDLINE

## (undated) DEVICE — TAPE ADH W2INXL10YD WHT PAPR GENTLE BRTH FLX COMFORTABLE

## (undated) DEVICE — SET INST DAVINCI ACCESSORIES

## (undated) DEVICE — TRAY ARTHREX NEW SHOULDER INSTR REUSABLE

## (undated) DEVICE — COLUMN DRAPE

## (undated) DEVICE — WARMER SCP 2 ANTIFOG LAP DISP

## (undated) DEVICE — GAUZE,SPONGE,4"X4",8PLY,STRL,LF,10/TRAY: Brand: MEDLINE

## (undated) DEVICE — SYRINGE MED 30ML STD CLR PLAS LUERLOCK TIP N CTRL DISP

## (undated) DEVICE — TROCAR: Brand: KII FIOS FIRST ENTRY

## (undated) DEVICE — TIP-UP FENESTRATED GRASPER: Brand: ENDOWRIST

## (undated) DEVICE — 3M™ IOBAN™ 2 ANTIMICROBIAL INCISE DRAPE 6640EZ: Brand: IOBAN™ 2

## (undated) DEVICE — STAPLER SHEATH: Brand: ENDOWRIST

## (undated) DEVICE — SOLUTION IV IRRIG WATER 1000ML POUR BRL 2F7114

## (undated) DEVICE — TUBING, SUCTION, 9/32" X 10', STRAIGHT: Brand: MEDLINE

## (undated) DEVICE — [AGGRESSIVE PLUS CUTTER, ARTHROSCOPIC SHAVER BLADE,  DO NOT RESTERILIZE,  DO NOT USE IF PACKAGE IS DAMAGED,  KEEP DRY,  KEEP AWAY FROM SUNLIGHT]: Brand: FORMULA

## (undated) DEVICE — 3M™ STERI-DRAPE™ U-DRAPE 1015: Brand: STERI-DRAPE™

## (undated) DEVICE — SYRINGE MED 50ML LUERLOCK TIP

## (undated) DEVICE — NEEDLE SUT PASS FOR ROT CUF LABRAL REP MULTFI SCORPION

## (undated) DEVICE — TRAY ATRYKER ARTHROSCOPIC KNEE INSTR REUSABLE

## (undated) DEVICE — NEEDLE SYR 18GA L1.5IN RED PLAS HUB S STL BLNT FILL W/O

## (undated) DEVICE — MARKER,SKIN,PREP-RESISTANT,NON-STERILE: Brand: MEDLINE

## (undated) DEVICE — [HIGH FLOW INSUFFLATOR,  DO NOT USE IF PACKAGE IS DAMAGED,  KEEP DRY,  KEEP AWAY FROM SUNLIGHT,  PROTECT FROM HEAT AND RADIOACTIVE SOURCES.]: Brand: PNEUMOSURE

## (undated) DEVICE — TUBING PMP L16FT MAIN DISP FOR AR-6400 AR-6475

## (undated) DEVICE — PACK,UNIV, II AURORA: Brand: MEDLINE

## (undated) DEVICE — E-Z CLEAN, NON-STICK, PTFE COATED, ELECTROSURGICAL BLADE ELECTRODE, MODIFIED EXTENDED INSULATION, 2.5 INCH (6.35 CM): Brand: MEGADYNE

## (undated) DEVICE — BLADE CLIPPER GEN PURP NS

## (undated) DEVICE — CLOTH SURG PREP PREOPERATIVE CHLORHEXIDINE GLUC 2% READYPREP

## (undated) DEVICE — PUMP SUC IRR TBNG L10FT W/ HNDPC ASSEMB STRYKEFLOW 2

## (undated) DEVICE — LABEL MED 4 IN SURG PANEL W/ PEN STRL

## (undated) DEVICE — GLOVE SURG SZ 7.5 L11.73IN FNGR THK9.8MIL STRW LTX POLYMER

## (undated) DEVICE — SET ORTHO STD STORTSTD1

## (undated) DEVICE — STRIP,CLOSURE,WOUND,MEDI-STRIP,1/2X4: Brand: MEDLINE

## (undated) DEVICE — INSTRUMENT STRYKER SHAVER REUSABLE

## (undated) DEVICE — AGENT HEMSTAT W4XL8IN OXIDIZED REGENERATED CELOS ABSRB

## (undated) DEVICE — PACK PROCEDURE SURG GEN CUST

## (undated) DEVICE — 3M™ COBAN™ NL STERILE NON-LATEX SELF-ADHERENT WRAP, 2084S, 4 IN X 5 YD (10 CM X 4,5 M), 18 ROLLS/CASE: Brand: 3M™ COBAN™

## (undated) DEVICE — Z DISCONTINUED USE 2275686 GLOVE SURG SZ 8 L12IN FNGR THK13MIL WHT ISOLEX POLYISOPRENE

## (undated) DEVICE — SHEET,DRAPE,40X58,STERILE: Brand: MEDLINE

## (undated) DEVICE — 4-PORT MANIFOLD: Brand: NEPTUNE 2

## (undated) DEVICE — NEEDLE FLTR 18GA L1.5IN MEM THK5UM BLNT DISP

## (undated) DEVICE — SEAL

## (undated) DEVICE — COVER DSG W7 7 8XL11IN WHT POR CLTH PRECUT WTRPRF MEDIPORE

## (undated) DEVICE — CANNULA SEAL

## (undated) DEVICE — REDUCER: Brand: ENDOWRIST

## (undated) DEVICE — ADHESIVE SKIN CLSR 0.7ML TOP DERMBND ADV

## (undated) DEVICE — [STANDARD 12-FLUTE BARREL BUR, ARTHROSCOPIC SHAVER BLADE,  DO NOT RESTERILIZE,  DO NOT USE IF PACKAGE IS DAMAGED,  KEEP DRY,  KEEP AWAY FROM SUNLIGHT]: Brand: FORMULA

## (undated) DEVICE — GLOVE ORANGE PI 8   MSG9080

## (undated) DEVICE — MARYLAND BIPOLAR FORCEPS: Brand: ENDOWRIST

## (undated) DEVICE — SOLUTION IV IRRIG LACTATED RINGERS 3000ML 2B7487

## (undated) DEVICE — COVER,MAYO STAND,STERILE: Brand: MEDLINE

## (undated) DEVICE — ALCOHOL 70% RUBBING 16OZ

## (undated) DEVICE — PATIENT RETURN ELECTRODE, SINGLE-USE, CONTACT QUALITY MONITORING, ADULT, WITH 9FT CORD, FOR PATIENTS WEIGING OVER 33LBS. (15KG): Brand: MEGADYNE

## (undated) DEVICE — TOTAL TRAY, DB, 100% SILI FOLEY, 16FR 10: Brand: MEDLINE

## (undated) DEVICE — MEGA SUTURECUT ND: Brand: ENDOWRIST

## (undated) DEVICE — INSTRUMENT SYSTEM 7 BATTERY REUSABLE

## (undated) DEVICE — BLADELESS OBTURATOR: Brand: WECK VISTA

## (undated) DEVICE — NEEDLE REPROC SCORPION MULTIFIRE

## (undated) DEVICE — CANNULA ARTHSCP L7CM DIA7MM TRNSLUC THRD FLX W/ NO SQUIRT

## (undated) DEVICE — LOOP VES W25MM THK1MM MAXI RED SIL FLD REPELLENT 100 PER

## (undated) DEVICE — CANISTER FOR THORACIC SUCTION SYSTEM: Brand: THOPAZ DISPOSABLE CANISTER 0.8L

## (undated) DEVICE — SCOPE DAVINCI XI 30 DEG W/CORD

## (undated) DEVICE — TAPE ADH W2INXL10YD PLAS TRNSPAR H2O RESIST HYPOALRG CURAD

## (undated) DEVICE — 1.5L THIN WALL CAN: Brand: CRD

## (undated) DEVICE — PAD,ABDOMINAL,5"X9",ST,LF,25/BX: Brand: MEDLINE INDUSTRIES, INC.

## (undated) DEVICE — DRESSING,GAUZE,XEROFORM,CURAD,1"X8",ST: Brand: CURAD

## (undated) DEVICE — POUCH, INSTRUMENT, 3POCKET, INVISISHIELD: Brand: MEDLINE

## (undated) DEVICE — KIT,ANTI FOG,W/SPONGE & FLUID,SOFT PACK: Brand: MEDLINE

## (undated) DEVICE — NEEDLE SPNL L3.5IN PNK HUB S STL REG WALL FIT STYL W/ QNCKE

## (undated) DEVICE — PAD,NON-ADHERENT,2X3,STERILE,LF,1/PK: Brand: MEDLINE

## (undated) DEVICE — CAMERA STRYKER 1488

## (undated) DEVICE — GLOVE ORANGE PI 7   MSG9070

## (undated) DEVICE — NEEDLE SPNL 20GA L3.5IN YEL HUB S STL REG WALL FIT STYL W/

## (undated) DEVICE — SYRINGE 20ML LL S/C 50

## (undated) DEVICE — GOWN,SIRUS,POLYRNF,BRTHSLV,XL,30/CS: Brand: MEDLINE

## (undated) DEVICE — PROGRASP FORCEPS: Brand: ENDOWRIST

## (undated) DEVICE — BAG SPEC RETRIEVALXL C2000ML MOUTH 14MM L27CM SHFT 15MM NYL

## (undated) DEVICE — SYRINGE, LUER LOCK, 10ML: Brand: MEDLINE

## (undated) DEVICE — STAPLER 60 RELOAD GREEN: Brand: SUREFORM

## (undated) DEVICE — SOLUTION IV IRRIG POUR BRL 0.9% SODIUM CHL 2F7124

## (undated) DEVICE — STAPLER 60 RELOAD BLACK: Brand: SUREFORM

## (undated) DEVICE — DAVINCI THORACIC INSTRUMENT SET

## (undated) DEVICE — DRAPE,LAP,CHOLE,W/TROUGHS,STERILE: Brand: MEDLINE

## (undated) DEVICE — GLOVE SURG SZ 65 THK91MIL LTX FREE SYN POLYISOPRENE

## (undated) DEVICE — DRAPE,SHOULDER,ORTHOMAX,W/POUCH,5/CS: Brand: MEDLINE

## (undated) DEVICE — CADIERE FORCEPS: Brand: ENDOWRIST

## (undated) DEVICE — GARMENT,MEDLINE,DVT,INT,CALF,MED, GEN2: Brand: MEDLINE

## (undated) DEVICE — CURITY FLEXIBLE ADHESIVE BANDAGE: Brand: CURITY

## (undated) DEVICE — PUMP SUCT THOR THOPAZ

## (undated) DEVICE — INSUFFLATION TUBING SET WITH FILTER, FUNNEL CONNECTOR AND LUER LOCK: Brand: JOSNOE MEDICAL INC

## (undated) DEVICE — SURGICAL PROCEDURE PACK BASIC

## (undated) DEVICE — DRAPE,REIN 53X77,STERILE: Brand: MEDLINE

## (undated) DEVICE — TUBING SUCT SGL M CONN SAMP PRT COMPATIBLE W/ 20-32FR THOPAZ

## (undated) DEVICE — CATHETER THORACENTESIS STR 28 FRX23 IN 6 EYELET TAPR TIP LF

## (undated) DEVICE — GOWN,SIRUS,POLYRNF,SETINSLV,XL,20/CS: Brand: MEDLINE

## (undated) DEVICE — 1000 S-DRAPE TOWEL DRAPE 10/BX: Brand: STERI-DRAPE™

## (undated) DEVICE — ROBOTIC THORACIC EXTRAS

## (undated) DEVICE — DRAPE THER FLUID WARMING 66X44 IN FLAT SLUSH DBL DISC ORS

## (undated) DEVICE — STANDARD HYPODERMIC NEEDLE,ALUMINUM HUB: Brand: MONOJECT

## (undated) DEVICE — NEEDLE,22GX1.5",REG,BEVEL: Brand: MEDLINE